# Patient Record
Sex: FEMALE | Race: WHITE | Employment: OTHER | URBAN - METROPOLITAN AREA
[De-identification: names, ages, dates, MRNs, and addresses within clinical notes are randomized per-mention and may not be internally consistent; named-entity substitution may affect disease eponyms.]

---

## 2019-10-28 ENCOUNTER — HOSPITAL ENCOUNTER (EMERGENCY)
Age: 84
Discharge: HOME | End: 2019-10-28
Attending: EMERGENCY MEDICINE
Payer: MEDICARE

## 2019-10-28 VITALS
WEIGHT: 125 LBS | HEART RATE: 71 BPM | TEMPERATURE: 97.9 F | HEIGHT: 62 IN | BODY MASS INDEX: 23 KG/M2 | OXYGEN SATURATION: 94 % | SYSTOLIC BLOOD PRESSURE: 151 MMHG | DIASTOLIC BLOOD PRESSURE: 71 MMHG | RESPIRATION RATE: 16 BRPM

## 2019-10-28 DIAGNOSIS — N39.0 URINARY TRACT INFECTION WITHOUT HEMATURIA, SITE UNSPECIFIED: Primary | ICD-10-CM

## 2019-10-28 LAB
BACTERIA: ABNORMAL /HPF
BILIRUBIN URINE: NEGATIVE
BLOOD, URINE: NEGATIVE
CLARITY: CLEAR
COLOR: YELLOW
EPITHELIAL CELLS, UA: ABNORMAL /HPF
GLUCOSE URINE: NEGATIVE MG/DL
KETONES, URINE: NEGATIVE MG/DL
LEUKOCYTE ESTERASE, URINE: ABNORMAL
NITRITE, URINE: NEGATIVE
PH UA: 6 (ref 5–9)
PROTEIN UA: NEGATIVE MG/DL
RBC UA: ABNORMAL /HPF (ref 0–2)
SPECIFIC GRAVITY UA: <=1.005 (ref 1–1.03)
UROBILINOGEN, URINE: 0.2 E.U./DL
WBC UA: ABNORMAL /HPF (ref 0–5)

## 2019-10-28 PROCEDURE — 87077 CULTURE AEROBIC IDENTIFY: CPT

## 2019-10-28 PROCEDURE — 99284 EMERGENCY DEPT VISIT MOD MDM: CPT

## 2019-10-28 PROCEDURE — 87088 URINE BACTERIA CULTURE: CPT

## 2019-10-28 PROCEDURE — 87186 SC STD MICRODIL/AGAR DIL: CPT

## 2019-10-28 PROCEDURE — 81001 URINALYSIS AUTO W/SCOPE: CPT

## 2019-10-28 RX ORDER — MONTELUKAST SODIUM 4 MG/1
2 TABLET, CHEWABLE ORAL 2 TIMES DAILY
COMMUNITY
End: 2022-05-23

## 2019-10-28 RX ORDER — TRAMADOL HYDROCHLORIDE 50 MG/1
50 TABLET ORAL EVERY 6 HOURS PRN
COMMUNITY
End: 2022-04-18 | Stop reason: SDUPTHER

## 2019-10-28 RX ORDER — ESCITALOPRAM OXALATE 20 MG/1
20 TABLET ORAL DAILY
COMMUNITY
End: 2021-12-14

## 2019-10-28 RX ORDER — CALCIUM CARBONATE 500(1250)
500 TABLET ORAL DAILY
COMMUNITY

## 2019-10-28 RX ORDER — ASPIRIN 81 MG/1
81 TABLET ORAL DAILY
COMMUNITY

## 2019-10-28 RX ORDER — ATORVASTATIN CALCIUM 40 MG/1
40 TABLET, FILM COATED ORAL DAILY
COMMUNITY
End: 2021-10-15

## 2019-10-28 RX ORDER — NITROFURANTOIN MACROCRYSTALS 100 MG/1
100 CAPSULE ORAL NIGHTLY
COMMUNITY
End: 2021-11-23 | Stop reason: SDUPTHER

## 2019-10-28 RX ORDER — LISINOPRIL 10 MG/1
10 TABLET ORAL DAILY
COMMUNITY
End: 2021-10-21

## 2019-10-28 RX ORDER — CEFDINIR 300 MG/1
300 CAPSULE ORAL 2 TIMES DAILY
Qty: 10 CAPSULE | Refills: 0 | Status: SHIPPED | OUTPATIENT
Start: 2019-10-28 | End: 2019-11-02

## 2019-10-28 RX ORDER — PANTOPRAZOLE SODIUM 40 MG/1
40 TABLET, DELAYED RELEASE ORAL DAILY
COMMUNITY
End: 2021-12-14

## 2019-10-28 SDOH — HEALTH STABILITY: MENTAL HEALTH: HOW OFTEN DO YOU HAVE A DRINK CONTAINING ALCOHOL?: NEVER

## 2019-10-28 ASSESSMENT — PAIN SCALES - GENERAL: PAINLEVEL_OUTOF10: 2

## 2019-10-28 ASSESSMENT — PAIN DESCRIPTION - DESCRIPTORS: DESCRIPTORS: BURNING

## 2019-10-30 LAB
ORGANISM: ABNORMAL
URINE CULTURE, ROUTINE: ABNORMAL

## 2019-11-08 ENCOUNTER — HOSPITAL ENCOUNTER (OUTPATIENT)
Age: 84
Discharge: HOME | End: 2019-11-08
Payer: MEDICARE

## 2019-11-08 ENCOUNTER — APPOINTMENT (OUTPATIENT)
Dept: CT IMAGING | Age: 84
End: 2019-11-08
Payer: MEDICARE

## 2019-11-08 ENCOUNTER — HOSPITAL ENCOUNTER (EMERGENCY)
Age: 84
Discharge: ANOTHER HOSPITAL | End: 2019-11-08
Attending: EMERGENCY MEDICINE
Payer: MEDICARE

## 2019-11-08 ENCOUNTER — HOSPITAL ENCOUNTER (INPATIENT)
Age: 84
LOS: 2 days | Discharge: HOME | DRG: 690 | End: 2019-11-10
Attending: INTERNAL MEDICINE | Admitting: INTERNAL MEDICINE
Payer: MEDICARE

## 2019-11-08 VITALS
RESPIRATION RATE: 16 BRPM | HEART RATE: 69 BPM | DIASTOLIC BLOOD PRESSURE: 62 MMHG | SYSTOLIC BLOOD PRESSURE: 142 MMHG | OXYGEN SATURATION: 96 % | TEMPERATURE: 97.8 F

## 2019-11-08 DIAGNOSIS — Z78.9 FAILURE OF OUTPATIENT TREATMENT: ICD-10-CM

## 2019-11-08 DIAGNOSIS — N30.00 ACUTE CYSTITIS WITHOUT HEMATURIA: Primary | ICD-10-CM

## 2019-11-08 PROBLEM — I10 HYPERTENSION: Status: ACTIVE | Noted: 2019-11-08

## 2019-11-08 PROBLEM — B96.5 PSEUDOMONAS URINARY TRACT INFECTION: Status: ACTIVE | Noted: 2019-11-08

## 2019-11-08 PROBLEM — N39.0 PSEUDOMONAS URINARY TRACT INFECTION: Status: ACTIVE | Noted: 2019-11-08

## 2019-11-08 LAB
ALBUMIN SERPL-MCNC: 4 G/DL (ref 3.5–5.2)
ALP BLD-CCNC: 82 U/L (ref 35–104)
ALT SERPL-CCNC: 9 U/L (ref 0–32)
AMYLASE: 79 U/L (ref 20–100)
ANION GAP SERPL CALCULATED.3IONS-SCNC: 12 MMOL/L (ref 7–16)
AST SERPL-CCNC: 25 U/L (ref 0–31)
BACTERIA: ABNORMAL /HPF
BASOPHILS ABSOLUTE: 0.17 E9/L (ref 0–0.2)
BASOPHILS RELATIVE PERCENT: 1.4 % (ref 0–2)
BILIRUB SERPL-MCNC: 0.4 MG/DL (ref 0–1.2)
BILIRUBIN URINE: NEGATIVE
BLOOD, URINE: NEGATIVE
BUN BLDV-MCNC: 17 MG/DL (ref 8–23)
CALCIUM SERPL-MCNC: 10 MG/DL (ref 8.6–10.2)
CHLORIDE BLD-SCNC: 100 MMOL/L (ref 98–107)
CLARITY: CLEAR
CO2: 25 MMOL/L (ref 22–29)
COLOR: YELLOW
CREAT SERPL-MCNC: 0.9 MG/DL (ref 0.5–1)
EKG ATRIAL RATE: 75 BPM
EKG P AXIS: 63 DEGREES
EKG P-R INTERVAL: 142 MS
EKG Q-T INTERVAL: 456 MS
EKG QRS DURATION: 74 MS
EKG QTC CALCULATION (BAZETT): 509 MS
EKG R AXIS: 29 DEGREES
EKG T AXIS: 74 DEGREES
EKG VENTRICULAR RATE: 75 BPM
EOSINOPHILS ABSOLUTE: 0.13 E9/L (ref 0.05–0.5)
EOSINOPHILS RELATIVE PERCENT: 1.1 % (ref 0–6)
GFR AFRICAN AMERICAN: >60
GFR NON-AFRICAN AMERICAN: 60 ML/MIN/1.73
GLUCOSE BLD-MCNC: 219 MG/DL (ref 74–99)
GLUCOSE URINE: 250 MG/DL
HCT VFR BLD CALC: 53.4 % (ref 34–48)
HEMOGLOBIN: 17.4 G/DL (ref 11.5–15.5)
IMMATURE GRANULOCYTES #: 0.08 E9/L
IMMATURE GRANULOCYTES %: 0.6 % (ref 0–5)
KETONES, URINE: NEGATIVE MG/DL
LACTIC ACID, SEPSIS: 1.7 MMOL/L (ref 0.5–1.9)
LEUKOCYTE ESTERASE, URINE: ABNORMAL
LYMPHOCYTES ABSOLUTE: 0.82 E9/L (ref 1.5–4)
LYMPHOCYTES RELATIVE PERCENT: 6.6 % (ref 20–42)
MCH RBC QN AUTO: 29.7 PG (ref 26–35)
MCHC RBC AUTO-ENTMCNC: 32.6 % (ref 32–34.5)
MCV RBC AUTO: 91.3 FL (ref 80–99.9)
MONOCYTES ABSOLUTE: 0.35 E9/L (ref 0.1–0.95)
MONOCYTES RELATIVE PERCENT: 2.8 % (ref 2–12)
NEUTROPHILS ABSOLUTE: 10.8 E9/L (ref 1.8–7.3)
NEUTROPHILS RELATIVE PERCENT: 87.5 % (ref 43–80)
NITRITE, URINE: NEGATIVE
PDW BLD-RTO: 14.2 FL (ref 11.5–15)
PH UA: 5 (ref 5–9)
PLATELET # BLD: 335 E9/L (ref 130–450)
PMV BLD AUTO: 11.6 FL (ref 7–12)
POTASSIUM REFLEX MAGNESIUM: 4.8 MMOL/L (ref 3.5–5)
PROTEIN UA: NEGATIVE MG/DL
RBC # BLD: 5.85 E12/L (ref 3.5–5.5)
RBC UA: ABNORMAL /HPF (ref 0–2)
SODIUM BLD-SCNC: 137 MMOL/L (ref 132–146)
SPECIFIC GRAVITY UA: 1.02 (ref 1–1.03)
TOTAL PROTEIN: 7.1 G/DL (ref 6.4–8.3)
TROPONIN: <0.01 NG/ML (ref 0–0.03)
UROBILINOGEN, URINE: 0.2 E.U./DL
WBC # BLD: 12.4 E9/L (ref 4.5–11.5)
WBC UA: ABNORMAL /HPF (ref 0–5)

## 2019-11-08 PROCEDURE — 74177 CT ABD & PELVIS W/CONTRAST: CPT

## 2019-11-08 PROCEDURE — A0425 GROUND MILEAGE: HCPCS

## 2019-11-08 PROCEDURE — 87088 URINE BACTERIA CULTURE: CPT

## 2019-11-08 PROCEDURE — 6360000002 HC RX W HCPCS: Performed by: NURSE PRACTITIONER

## 2019-11-08 PROCEDURE — 2580000003 HC RX 258: Performed by: NURSE PRACTITIONER

## 2019-11-08 PROCEDURE — 99223 1ST HOSP IP/OBS HIGH 75: CPT | Performed by: INTERNAL MEDICINE

## 2019-11-08 PROCEDURE — 6370000000 HC RX 637 (ALT 250 FOR IP): Performed by: NURSE PRACTITIONER

## 2019-11-08 PROCEDURE — 84484 ASSAY OF TROPONIN QUANT: CPT

## 2019-11-08 PROCEDURE — 36415 COLL VENOUS BLD VENIPUNCTURE: CPT

## 2019-11-08 PROCEDURE — 82150 ASSAY OF AMYLASE: CPT

## 2019-11-08 PROCEDURE — 93010 ELECTROCARDIOGRAM REPORT: CPT | Performed by: INTERNAL MEDICINE

## 2019-11-08 PROCEDURE — 6370000000 HC RX 637 (ALT 250 FOR IP): Performed by: INTERNAL MEDICINE

## 2019-11-08 PROCEDURE — APPSS45 APP SPLIT SHARED TIME 31-45 MINUTES: Performed by: NURSE PRACTITIONER

## 2019-11-08 PROCEDURE — A0428 BLS: HCPCS

## 2019-11-08 PROCEDURE — 96365 THER/PROPH/DIAG IV INF INIT: CPT

## 2019-11-08 PROCEDURE — 6360000004 HC RX CONTRAST MEDICATION: Performed by: RADIOLOGY

## 2019-11-08 PROCEDURE — 80053 COMPREHEN METABOLIC PANEL: CPT

## 2019-11-08 PROCEDURE — 2580000003 HC RX 258: Performed by: RADIOLOGY

## 2019-11-08 PROCEDURE — 85025 COMPLETE CBC W/AUTO DIFF WBC: CPT

## 2019-11-08 PROCEDURE — 96366 THER/PROPH/DIAG IV INF ADDON: CPT

## 2019-11-08 PROCEDURE — 93005 ELECTROCARDIOGRAM TRACING: CPT | Performed by: NURSE PRACTITIONER

## 2019-11-08 PROCEDURE — 83605 ASSAY OF LACTIC ACID: CPT

## 2019-11-08 PROCEDURE — 99285 EMERGENCY DEPT VISIT HI MDM: CPT

## 2019-11-08 PROCEDURE — 87040 BLOOD CULTURE FOR BACTERIA: CPT

## 2019-11-08 PROCEDURE — 1200000000 HC SEMI PRIVATE

## 2019-11-08 PROCEDURE — 81001 URINALYSIS AUTO W/SCOPE: CPT

## 2019-11-08 RX ORDER — PHENAZOPYRIDINE HYDROCHLORIDE 100 MG/1
100 TABLET, FILM COATED ORAL 3 TIMES DAILY PRN
Status: DISCONTINUED | OUTPATIENT
Start: 2019-11-08 | End: 2019-11-10 | Stop reason: HOSPADM

## 2019-11-08 RX ORDER — 0.9 % SODIUM CHLORIDE 0.9 %
1000 INTRAVENOUS SOLUTION INTRAVENOUS ONCE
Status: COMPLETED | OUTPATIENT
Start: 2019-11-08 | End: 2019-11-08

## 2019-11-08 RX ORDER — SODIUM CHLORIDE 0.9 % (FLUSH) 0.9 %
10 SYRINGE (ML) INJECTION EVERY 12 HOURS SCHEDULED
Status: DISCONTINUED | OUTPATIENT
Start: 2019-11-08 | End: 2019-11-10 | Stop reason: HOSPADM

## 2019-11-08 RX ORDER — SODIUM CHLORIDE 0.9 % (FLUSH) 0.9 %
10 SYRINGE (ML) INJECTION PRN
Status: DISCONTINUED | OUTPATIENT
Start: 2019-11-08 | End: 2019-11-08 | Stop reason: HOSPADM

## 2019-11-08 RX ORDER — ACETAMINOPHEN 325 MG/1
650 TABLET ORAL EVERY 4 HOURS PRN
Status: DISCONTINUED | OUTPATIENT
Start: 2019-11-08 | End: 2019-11-10 | Stop reason: HOSPADM

## 2019-11-08 RX ORDER — ESCITALOPRAM OXALATE 10 MG/1
20 TABLET ORAL DAILY
Status: DISCONTINUED | OUTPATIENT
Start: 2019-11-08 | End: 2019-11-10 | Stop reason: HOSPADM

## 2019-11-08 RX ORDER — SODIUM CHLORIDE 0.9 % (FLUSH) 0.9 %
10 SYRINGE (ML) INJECTION PRN
Status: DISCONTINUED | OUTPATIENT
Start: 2019-11-08 | End: 2019-11-10 | Stop reason: HOSPADM

## 2019-11-08 RX ORDER — SODIUM CHLORIDE 9 MG/ML
1000 INJECTION, SOLUTION INTRAVENOUS CONTINUOUS
Status: DISCONTINUED | OUTPATIENT
Start: 2019-11-08 | End: 2019-11-08 | Stop reason: HOSPADM

## 2019-11-08 RX ORDER — PROCHLORPERAZINE MALEATE 5 MG/1
5 TABLET ORAL EVERY 6 HOURS PRN
Status: DISCONTINUED | OUTPATIENT
Start: 2019-11-08 | End: 2019-11-10 | Stop reason: HOSPADM

## 2019-11-08 RX ORDER — ASPIRIN 81 MG/1
81 TABLET ORAL DAILY
Status: DISCONTINUED | OUTPATIENT
Start: 2019-11-08 | End: 2019-11-10 | Stop reason: HOSPADM

## 2019-11-08 RX ORDER — CALCIUM CARBONATE 500(1250)
500 TABLET ORAL 3 TIMES DAILY
Status: DISCONTINUED | OUTPATIENT
Start: 2019-11-08 | End: 2019-11-08 | Stop reason: CLARIF

## 2019-11-08 RX ORDER — LISINOPRIL 10 MG/1
10 TABLET ORAL DAILY
Status: DISCONTINUED | OUTPATIENT
Start: 2019-11-08 | End: 2019-11-10 | Stop reason: HOSPADM

## 2019-11-08 RX ORDER — ATORVASTATIN CALCIUM 40 MG/1
40 TABLET, FILM COATED ORAL DAILY
Status: DISCONTINUED | OUTPATIENT
Start: 2019-11-08 | End: 2019-11-10 | Stop reason: HOSPADM

## 2019-11-08 RX ORDER — ONDANSETRON 2 MG/ML
4 INJECTION INTRAMUSCULAR; INTRAVENOUS EVERY 6 HOURS PRN
Status: DISCONTINUED | OUTPATIENT
Start: 2019-11-08 | End: 2019-11-08

## 2019-11-08 RX ORDER — TRAMADOL HYDROCHLORIDE 50 MG/1
50 TABLET ORAL EVERY 6 HOURS PRN
Status: DISCONTINUED | OUTPATIENT
Start: 2019-11-08 | End: 2019-11-10 | Stop reason: HOSPADM

## 2019-11-08 RX ORDER — MONTELUKAST SODIUM 4 MG/1
1 TABLET, CHEWABLE ORAL
Status: DISCONTINUED | OUTPATIENT
Start: 2019-11-08 | End: 2019-11-10 | Stop reason: HOSPADM

## 2019-11-08 RX ORDER — CEFEPIME HYDROCHLORIDE 1 G/1
INJECTION, POWDER, FOR SOLUTION INTRAMUSCULAR; INTRAVENOUS
Status: DISCONTINUED
Start: 2019-11-08 | End: 2019-11-08 | Stop reason: HOSPADM

## 2019-11-08 RX ORDER — PANTOPRAZOLE SODIUM 40 MG/1
40 TABLET, DELAYED RELEASE ORAL DAILY
Status: DISCONTINUED | OUTPATIENT
Start: 2019-11-08 | End: 2019-11-10 | Stop reason: HOSPADM

## 2019-11-08 RX ORDER — SODIUM CHLORIDE 9 MG/ML
INJECTION, SOLUTION INTRAVENOUS CONTINUOUS
Status: DISCONTINUED | OUTPATIENT
Start: 2019-11-08 | End: 2019-11-09

## 2019-11-08 RX ADMIN — OYSTER SHELL CALCIUM WITH VITAMIN D 1 TABLET: 500; 200 TABLET, FILM COATED ORAL at 20:03

## 2019-11-08 RX ADMIN — Medication 10 ML: at 12:51

## 2019-11-08 RX ADMIN — PROCHLORPERAZINE MALEATE 5 MG: 5 TABLET, FILM COATED ORAL at 18:47

## 2019-11-08 RX ADMIN — LISINOPRIL 10 MG: 10 TABLET ORAL at 18:39

## 2019-11-08 RX ADMIN — SODIUM CHLORIDE: 9 INJECTION, SOLUTION INTRAVENOUS at 18:36

## 2019-11-08 RX ADMIN — CEFEPIME HYDROCHLORIDE 1 G: 1 INJECTION, POWDER, FOR SOLUTION INTRAMUSCULAR; INTRAVENOUS at 12:11

## 2019-11-08 RX ADMIN — PANTOPRAZOLE SODIUM 40 MG: 40 TABLET, DELAYED RELEASE ORAL at 18:39

## 2019-11-08 RX ADMIN — ENOXAPARIN SODIUM 40 MG: 40 INJECTION SUBCUTANEOUS at 18:39

## 2019-11-08 RX ADMIN — IOPAMIDOL 100 ML: 755 INJECTION, SOLUTION INTRAVENOUS at 12:51

## 2019-11-08 RX ADMIN — ATORVASTATIN CALCIUM 40 MG: 40 TABLET, FILM COATED ORAL at 18:39

## 2019-11-08 RX ADMIN — ASPIRIN 81 MG: 81 TABLET, FILM COATED ORAL at 18:39

## 2019-11-08 RX ADMIN — SODIUM CHLORIDE 1000 ML: 9 INJECTION, SOLUTION INTRAVENOUS at 12:12

## 2019-11-08 RX ADMIN — ESCITALOPRAM OXALATE 20 MG: 10 TABLET ORAL at 18:39

## 2019-11-08 ASSESSMENT — PAIN DESCRIPTION - ONSET
ONSET: ON-GOING
ONSET: GRADUAL

## 2019-11-08 ASSESSMENT — PAIN DESCRIPTION - LOCATION
LOCATION: ABDOMEN
LOCATION: ABDOMEN

## 2019-11-08 ASSESSMENT — PAIN DESCRIPTION - FREQUENCY: FREQUENCY: INTERMITTENT

## 2019-11-08 ASSESSMENT — PAIN DESCRIPTION - PROGRESSION
CLINICAL_PROGRESSION: NOT CHANGED
CLINICAL_PROGRESSION: NOT CHANGED

## 2019-11-08 ASSESSMENT — PAIN DESCRIPTION - DESCRIPTORS
DESCRIPTORS: BURNING;ACHING;DISCOMFORT
DESCRIPTORS: BURNING;ACHING

## 2019-11-08 ASSESSMENT — PAIN SCALES - GENERAL
PAINLEVEL_OUTOF10: 0
PAINLEVEL_OUTOF10: 4
PAINLEVEL_OUTOF10: 0

## 2019-11-08 ASSESSMENT — PAIN DESCRIPTION - PAIN TYPE
TYPE: ACUTE PAIN
TYPE: ACUTE PAIN

## 2019-11-09 LAB
ANION GAP SERPL CALCULATED.3IONS-SCNC: 11 MMOL/L (ref 7–16)
BUN BLDV-MCNC: 17 MG/DL (ref 8–23)
CALCIUM SERPL-MCNC: 9 MG/DL (ref 8.6–10.2)
CHLORIDE BLD-SCNC: 105 MMOL/L (ref 98–107)
CO2: 23 MMOL/L (ref 22–29)
CREAT SERPL-MCNC: 0.9 MG/DL (ref 0.5–1)
GFR AFRICAN AMERICAN: >60
GFR NON-AFRICAN AMERICAN: 60 ML/MIN/1.73
GLUCOSE BLD-MCNC: 134 MG/DL (ref 74–99)
HBA1C MFR BLD: 6.4 % (ref 4–5.6)
HCT VFR BLD CALC: 48.6 % (ref 34–48)
HEMOGLOBIN: 15.6 G/DL (ref 11.5–15.5)
MCH RBC QN AUTO: 28.8 PG (ref 26–35)
MCHC RBC AUTO-ENTMCNC: 32.1 % (ref 32–34.5)
MCV RBC AUTO: 89.7 FL (ref 80–99.9)
PDW BLD-RTO: 14.1 FL (ref 11.5–15)
PLATELET # BLD: 299 E9/L (ref 130–450)
PMV BLD AUTO: 10.5 FL (ref 7–12)
POTASSIUM SERPL-SCNC: 4.3 MMOL/L (ref 3.5–5)
RBC # BLD: 5.42 E12/L (ref 3.5–5.5)
SODIUM BLD-SCNC: 139 MMOL/L (ref 132–146)
URINE CULTURE, ROUTINE: NORMAL
WBC # BLD: 11.4 E9/L (ref 4.5–11.5)

## 2019-11-09 PROCEDURE — 6370000000 HC RX 637 (ALT 250 FOR IP): Performed by: NURSE PRACTITIONER

## 2019-11-09 PROCEDURE — 6370000000 HC RX 637 (ALT 250 FOR IP): Performed by: INTERNAL MEDICINE

## 2019-11-09 PROCEDURE — 83036 HEMOGLOBIN GLYCOSYLATED A1C: CPT

## 2019-11-09 PROCEDURE — 6360000002 HC RX W HCPCS: Performed by: NURSE PRACTITIONER

## 2019-11-09 PROCEDURE — 2580000003 HC RX 258: Performed by: SPECIALIST

## 2019-11-09 PROCEDURE — 51798 US URINE CAPACITY MEASURE: CPT

## 2019-11-09 PROCEDURE — 97161 PT EVAL LOW COMPLEX 20 MIN: CPT | Performed by: PHYSICAL THERAPIST

## 2019-11-09 PROCEDURE — 85027 COMPLETE CBC AUTOMATED: CPT

## 2019-11-09 PROCEDURE — 2580000003 HC RX 258: Performed by: NURSE PRACTITIONER

## 2019-11-09 PROCEDURE — 80048 BASIC METABOLIC PNL TOTAL CA: CPT

## 2019-11-09 PROCEDURE — 1200000000 HC SEMI PRIVATE

## 2019-11-09 PROCEDURE — 97530 THERAPEUTIC ACTIVITIES: CPT | Performed by: PHYSICAL THERAPIST

## 2019-11-09 PROCEDURE — APPSS30 APP SPLIT SHARED TIME 16-30 MINUTES: Performed by: NURSE PRACTITIONER

## 2019-11-09 PROCEDURE — 99232 SBSQ HOSP IP/OBS MODERATE 35: CPT | Performed by: INTERNAL MEDICINE

## 2019-11-09 PROCEDURE — 6360000002 HC RX W HCPCS: Performed by: SPECIALIST

## 2019-11-09 PROCEDURE — 36415 COLL VENOUS BLD VENIPUNCTURE: CPT

## 2019-11-09 RX ORDER — SODIUM CHLORIDE 9 MG/ML
INJECTION, SOLUTION INTRAVENOUS CONTINUOUS
Status: ACTIVE | OUTPATIENT
Start: 2019-11-09 | End: 2019-11-09

## 2019-11-09 RX ADMIN — OYSTER SHELL CALCIUM WITH VITAMIN D 1 TABLET: 500; 200 TABLET, FILM COATED ORAL at 14:16

## 2019-11-09 RX ADMIN — VANCOMYCIN HYDROCHLORIDE 1000 MG: 1 INJECTION, POWDER, LYOPHILIZED, FOR SOLUTION INTRAVENOUS at 21:06

## 2019-11-09 RX ADMIN — SODIUM CHLORIDE 100 ML/HR: 9 INJECTION, SOLUTION INTRAVENOUS at 04:32

## 2019-11-09 RX ADMIN — CEFEPIME HYDROCHLORIDE 1 G: 1 INJECTION, POWDER, FOR SOLUTION INTRAMUSCULAR; INTRAVENOUS at 05:36

## 2019-11-09 RX ADMIN — ESCITALOPRAM OXALATE 20 MG: 10 TABLET ORAL at 09:07

## 2019-11-09 RX ADMIN — LISINOPRIL 10 MG: 10 TABLET ORAL at 09:07

## 2019-11-09 RX ADMIN — ASPIRIN 81 MG: 81 TABLET, FILM COATED ORAL at 09:07

## 2019-11-09 RX ADMIN — Medication 10 ML: at 21:05

## 2019-11-09 RX ADMIN — OYSTER SHELL CALCIUM WITH VITAMIN D 1 TABLET: 500; 200 TABLET, FILM COATED ORAL at 09:07

## 2019-11-09 RX ADMIN — CEFEPIME HYDROCHLORIDE 1 G: 1 INJECTION, POWDER, FOR SOLUTION INTRAMUSCULAR; INTRAVENOUS at 17:59

## 2019-11-09 RX ADMIN — MONTELUKAST SODIUM 1 G: 4 TABLET, CHEWABLE ORAL at 11:45

## 2019-11-09 RX ADMIN — Medication 10 ML: at 09:08

## 2019-11-09 RX ADMIN — OYSTER SHELL CALCIUM WITH VITAMIN D 1 TABLET: 500; 200 TABLET, FILM COATED ORAL at 21:05

## 2019-11-09 RX ADMIN — PANTOPRAZOLE SODIUM 40 MG: 40 TABLET, DELAYED RELEASE ORAL at 09:07

## 2019-11-09 RX ADMIN — ENOXAPARIN SODIUM 40 MG: 40 INJECTION SUBCUTANEOUS at 09:08

## 2019-11-09 RX ADMIN — ATORVASTATIN CALCIUM 40 MG: 40 TABLET, FILM COATED ORAL at 09:07

## 2019-11-09 RX ADMIN — MONTELUKAST SODIUM 1 G: 4 TABLET, CHEWABLE ORAL at 09:06

## 2019-11-09 RX ADMIN — MONTELUKAST SODIUM 1 G: 4 TABLET, CHEWABLE ORAL at 17:57

## 2019-11-09 ASSESSMENT — PAIN SCALES - GENERAL
PAINLEVEL_OUTOF10: 0

## 2019-11-10 VITALS
BODY MASS INDEX: 23 KG/M2 | DIASTOLIC BLOOD PRESSURE: 63 MMHG | SYSTOLIC BLOOD PRESSURE: 135 MMHG | HEART RATE: 74 BPM | OXYGEN SATURATION: 98 % | TEMPERATURE: 97.8 F | HEIGHT: 62 IN | WEIGHT: 125 LBS | RESPIRATION RATE: 18 BRPM

## 2019-11-10 LAB
ANION GAP SERPL CALCULATED.3IONS-SCNC: 11 MMOL/L (ref 7–16)
BUN BLDV-MCNC: 14 MG/DL (ref 8–23)
CALCIUM SERPL-MCNC: 9.3 MG/DL (ref 8.6–10.2)
CHLORIDE BLD-SCNC: 101 MMOL/L (ref 98–107)
CO2: 24 MMOL/L (ref 22–29)
CREAT SERPL-MCNC: 0.9 MG/DL (ref 0.5–1)
GFR AFRICAN AMERICAN: >60
GFR NON-AFRICAN AMERICAN: 60 ML/MIN/1.73
GLUCOSE BLD-MCNC: 161 MG/DL (ref 74–99)
HCT VFR BLD CALC: 48.5 % (ref 34–48)
HEMOGLOBIN: 15.6 G/DL (ref 11.5–15.5)
MCH RBC QN AUTO: 29.1 PG (ref 26–35)
MCHC RBC AUTO-ENTMCNC: 32.2 % (ref 32–34.5)
MCV RBC AUTO: 90.3 FL (ref 80–99.9)
PDW BLD-RTO: 14.2 FL (ref 11.5–15)
PLATELET # BLD: 314 E9/L (ref 130–450)
PMV BLD AUTO: 11.3 FL (ref 7–12)
POTASSIUM SERPL-SCNC: 4.2 MMOL/L (ref 3.5–5)
RBC # BLD: 5.37 E12/L (ref 3.5–5.5)
SODIUM BLD-SCNC: 136 MMOL/L (ref 132–146)
URINE CULTURE, ROUTINE: NORMAL
WBC # BLD: 11.5 E9/L (ref 4.5–11.5)

## 2019-11-10 PROCEDURE — 99239 HOSP IP/OBS DSCHRG MGMT >30: CPT | Performed by: INTERNAL MEDICINE

## 2019-11-10 PROCEDURE — 6370000000 HC RX 637 (ALT 250 FOR IP): Performed by: NURSE PRACTITIONER

## 2019-11-10 PROCEDURE — 85027 COMPLETE CBC AUTOMATED: CPT

## 2019-11-10 PROCEDURE — APPSS45 APP SPLIT SHARED TIME 31-45 MINUTES: Performed by: NURSE PRACTITIONER

## 2019-11-10 PROCEDURE — 93005 ELECTROCARDIOGRAM TRACING: CPT | Performed by: INTERNAL MEDICINE

## 2019-11-10 PROCEDURE — 36415 COLL VENOUS BLD VENIPUNCTURE: CPT

## 2019-11-10 PROCEDURE — 2580000003 HC RX 258: Performed by: NURSE PRACTITIONER

## 2019-11-10 PROCEDURE — 80048 BASIC METABOLIC PNL TOTAL CA: CPT

## 2019-11-10 PROCEDURE — 6370000000 HC RX 637 (ALT 250 FOR IP): Performed by: INTERNAL MEDICINE

## 2019-11-10 PROCEDURE — 6360000002 HC RX W HCPCS: Performed by: NURSE PRACTITIONER

## 2019-11-10 RX ORDER — LEVOFLOXACIN 750 MG/1
750 TABLET ORAL
Qty: 3 TABLET | Refills: 0 | Status: SHIPPED | OUTPATIENT
Start: 2019-11-12 | End: 2019-12-18 | Stop reason: ALTCHOICE

## 2019-11-10 RX ORDER — LEVOFLOXACIN 750 MG/1
750 TABLET ORAL ONCE
Status: COMPLETED | OUTPATIENT
Start: 2019-11-10 | End: 2019-11-10

## 2019-11-10 RX ADMIN — PANTOPRAZOLE SODIUM 40 MG: 40 TABLET, DELAYED RELEASE ORAL at 06:02

## 2019-11-10 RX ADMIN — TRAMADOL HYDROCHLORIDE 50 MG: 50 TABLET, FILM COATED ORAL at 00:40

## 2019-11-10 RX ADMIN — ESCITALOPRAM OXALATE 20 MG: 10 TABLET ORAL at 06:03

## 2019-11-10 RX ADMIN — ATORVASTATIN CALCIUM 40 MG: 40 TABLET, FILM COATED ORAL at 06:02

## 2019-11-10 RX ADMIN — OYSTER SHELL CALCIUM WITH VITAMIN D 1 TABLET: 500; 200 TABLET, FILM COATED ORAL at 12:58

## 2019-11-10 RX ADMIN — OYSTER SHELL CALCIUM WITH VITAMIN D 1 TABLET: 500; 200 TABLET, FILM COATED ORAL at 06:03

## 2019-11-10 RX ADMIN — Medication 10 ML: at 06:05

## 2019-11-10 RX ADMIN — LISINOPRIL 10 MG: 10 TABLET ORAL at 09:16

## 2019-11-10 RX ADMIN — LEVOFLOXACIN 750 MG: 750 TABLET, FILM COATED ORAL at 12:58

## 2019-11-10 RX ADMIN — MONTELUKAST SODIUM 1 G: 4 TABLET, CHEWABLE ORAL at 11:55

## 2019-11-10 RX ADMIN — ASPIRIN 81 MG: 81 TABLET, FILM COATED ORAL at 06:02

## 2019-11-10 RX ADMIN — ENOXAPARIN SODIUM 40 MG: 40 INJECTION SUBCUTANEOUS at 09:16

## 2019-11-10 RX ADMIN — CEFEPIME HYDROCHLORIDE 1 G: 1 INJECTION, POWDER, FOR SOLUTION INTRAMUSCULAR; INTRAVENOUS at 06:00

## 2019-11-10 RX ADMIN — MONTELUKAST SODIUM 1 G: 4 TABLET, CHEWABLE ORAL at 08:14

## 2019-11-10 ASSESSMENT — PAIN SCALES - GENERAL
PAINLEVEL_OUTOF10: 4
PAINLEVEL_OUTOF10: 0

## 2019-11-10 ASSESSMENT — PAIN SCALES - WONG BAKER: WONGBAKER_NUMERICALRESPONSE: 0

## 2019-11-10 ASSESSMENT — PAIN - FUNCTIONAL ASSESSMENT: PAIN_FUNCTIONAL_ASSESSMENT: ACTIVITIES ARE NOT PREVENTED

## 2019-11-11 LAB
EKG ATRIAL RATE: 67 BPM
EKG P AXIS: 67 DEGREES
EKG P-R INTERVAL: 130 MS
EKG Q-T INTERVAL: 440 MS
EKG QRS DURATION: 88 MS
EKG QTC CALCULATION (BAZETT): 464 MS
EKG R AXIS: 40 DEGREES
EKG T AXIS: 78 DEGREES
EKG VENTRICULAR RATE: 67 BPM

## 2019-11-11 PROCEDURE — 93010 ELECTROCARDIOGRAM REPORT: CPT | Performed by: INTERNAL MEDICINE

## 2019-11-13 LAB
BLOOD CULTURE, ROUTINE: NORMAL
CULTURE, BLOOD 2: NORMAL

## 2019-12-30 ENCOUNTER — HOSPITAL ENCOUNTER (OUTPATIENT)
Age: 84
Discharge: HOME | End: 2020-01-01
Payer: MEDICARE

## 2019-12-30 LAB
ALBUMIN SERPL-MCNC: 4 G/DL (ref 3.5–5.2)
ALP BLD-CCNC: 68 U/L (ref 35–104)
ALT SERPL-CCNC: 11 U/L (ref 0–32)
ANION GAP SERPL CALCULATED.3IONS-SCNC: 15 MMOL/L (ref 7–16)
AST SERPL-CCNC: 17 U/L (ref 0–31)
BASOPHILS ABSOLUTE: 0.12 E9/L (ref 0–0.2)
BASOPHILS RELATIVE PERCENT: 1.2 % (ref 0–2)
BILIRUB SERPL-MCNC: 0.6 MG/DL (ref 0–1.2)
BUN BLDV-MCNC: 19 MG/DL (ref 8–23)
CALCIUM SERPL-MCNC: 9.7 MG/DL (ref 8.6–10.2)
CHLORIDE BLD-SCNC: 102 MMOL/L (ref 98–107)
CHOLESTEROL, TOTAL: 119 MG/DL (ref 0–199)
CO2: 23 MMOL/L (ref 22–29)
CREAT SERPL-MCNC: 1.2 MG/DL (ref 0.5–1)
EOSINOPHILS ABSOLUTE: 0.23 E9/L (ref 0.05–0.5)
EOSINOPHILS RELATIVE PERCENT: 2.3 % (ref 0–6)
GFR AFRICAN AMERICAN: 52
GFR NON-AFRICAN AMERICAN: 43 ML/MIN/1.73
GLUCOSE BLD-MCNC: 138 MG/DL (ref 74–99)
HCT VFR BLD CALC: 57.7 % (ref 34–48)
HDLC SERPL-MCNC: 46 MG/DL
HEMOGLOBIN: 16.8 G/DL (ref 11.5–15.5)
IMMATURE GRANULOCYTES #: 0.04 E9/L
IMMATURE GRANULOCYTES %: 0.4 % (ref 0–5)
LDL CHOLESTEROL CALCULATED: 44 MG/DL (ref 0–99)
LYMPHOCYTES ABSOLUTE: 0.98 E9/L (ref 1.5–4)
LYMPHOCYTES RELATIVE PERCENT: 9.7 % (ref 20–42)
MCH RBC QN AUTO: 25.5 PG (ref 26–35)
MCHC RBC AUTO-ENTMCNC: 29.1 % (ref 32–34.5)
MCV RBC AUTO: 87.4 FL (ref 80–99.9)
MONOCYTES ABSOLUTE: 0.34 E9/L (ref 0.1–0.95)
MONOCYTES RELATIVE PERCENT: 3.4 % (ref 2–12)
NEUTROPHILS ABSOLUTE: 8.4 E9/L (ref 1.8–7.3)
NEUTROPHILS RELATIVE PERCENT: 83 % (ref 43–80)
PDW BLD-RTO: 17.1 FL (ref 11.5–15)
PLATELET # BLD: 360 E9/L (ref 130–450)
PMV BLD AUTO: 12 FL (ref 7–12)
POTASSIUM SERPL-SCNC: 4.9 MMOL/L (ref 3.5–5)
RBC # BLD: 6.6 E12/L (ref 3.5–5.5)
SODIUM BLD-SCNC: 140 MMOL/L (ref 132–146)
TOTAL PROTEIN: 6.4 G/DL (ref 6.4–8.3)
TRIGL SERPL-MCNC: 144 MG/DL (ref 0–149)
TSH SERPL DL<=0.05 MIU/L-ACNC: 2.94 UIU/ML (ref 0.27–4.2)
VLDLC SERPL CALC-MCNC: 29 MG/DL
WBC # BLD: 10.1 E9/L (ref 4.5–11.5)

## 2019-12-30 PROCEDURE — 80053 COMPREHEN METABOLIC PANEL: CPT

## 2019-12-30 PROCEDURE — 36415 COLL VENOUS BLD VENIPUNCTURE: CPT

## 2019-12-30 PROCEDURE — 80061 LIPID PANEL: CPT

## 2019-12-30 PROCEDURE — 84443 ASSAY THYROID STIM HORMONE: CPT

## 2019-12-30 PROCEDURE — 85025 COMPLETE CBC W/AUTO DIFF WBC: CPT

## 2020-01-28 ENCOUNTER — HOSPITAL ENCOUNTER (OUTPATIENT)
Age: 85
Discharge: HOME | End: 2020-01-30
Payer: MEDICARE

## 2020-01-28 LAB
ACANTHOCYTES: ABNORMAL
ALBUMIN SERPL-MCNC: 4 G/DL (ref 3.5–5.2)
ALP BLD-CCNC: 72 U/L (ref 35–104)
ALT SERPL-CCNC: 10 U/L (ref 0–32)
ANION GAP SERPL CALCULATED.3IONS-SCNC: 18 MMOL/L (ref 7–16)
ANISOCYTOSIS: ABNORMAL
AST SERPL-CCNC: 17 U/L (ref 0–31)
BASOPHILS ABSOLUTE: 0.11 E9/L (ref 0–0.2)
BASOPHILS RELATIVE PERCENT: 1 % (ref 0–2)
BILIRUB SERPL-MCNC: 0.7 MG/DL (ref 0–1.2)
BUN BLDV-MCNC: 14 MG/DL (ref 8–23)
CALCIUM SERPL-MCNC: 9.9 MG/DL (ref 8.6–10.2)
CHLORIDE BLD-SCNC: 103 MMOL/L (ref 98–107)
CO2: 22 MMOL/L (ref 22–29)
CREAT SERPL-MCNC: 1.1 MG/DL (ref 0.5–1)
EOSINOPHILS ABSOLUTE: 0.3 E9/L (ref 0.05–0.5)
EOSINOPHILS RELATIVE PERCENT: 2.8 % (ref 0–6)
GFR AFRICAN AMERICAN: 57
GFR NON-AFRICAN AMERICAN: 47 ML/MIN/1.73
GLUCOSE BLD-MCNC: 141 MG/DL (ref 74–99)
HBA1C MFR BLD: 6.4 % (ref 4–5.6)
HCT VFR BLD CALC: 59.6 % (ref 34–48)
HEMOGLOBIN: 17 G/DL (ref 11.5–15.5)
IMMATURE GRANULOCYTES #: 0.05 E9/L
IMMATURE GRANULOCYTES %: 0.5 % (ref 0–5)
LYMPHOCYTES ABSOLUTE: 0.93 E9/L (ref 1.5–4)
LYMPHOCYTES RELATIVE PERCENT: 8.8 % (ref 20–42)
MCH RBC QN AUTO: 24.5 PG (ref 26–35)
MCHC RBC AUTO-ENTMCNC: 28.5 % (ref 32–34.5)
MCV RBC AUTO: 85.8 FL (ref 80–99.9)
MONOCYTES ABSOLUTE: 0.36 E9/L (ref 0.1–0.95)
MONOCYTES RELATIVE PERCENT: 3.4 % (ref 2–12)
NEUTROPHILS ABSOLUTE: 8.87 E9/L (ref 1.8–7.3)
NEUTROPHILS RELATIVE PERCENT: 83.5 % (ref 43–80)
OVALOCYTES: ABNORMAL
PDW BLD-RTO: 19.1 FL (ref 11.5–15)
PLATELET # BLD: 335 E9/L (ref 130–450)
PMV BLD AUTO: 11.7 FL (ref 7–12)
POIKILOCYTES: ABNORMAL
POLYCHROMASIA: ABNORMAL
POTASSIUM SERPL-SCNC: 4.6 MMOL/L (ref 3.5–5)
RBC # BLD: 6.95 E12/L (ref 3.5–5.5)
SODIUM BLD-SCNC: 143 MMOL/L (ref 132–146)
TOTAL PROTEIN: 6.6 G/DL (ref 6.4–8.3)
WBC # BLD: 10.6 E9/L (ref 4.5–11.5)

## 2020-01-28 PROCEDURE — 85025 COMPLETE CBC W/AUTO DIFF WBC: CPT

## 2020-01-28 PROCEDURE — 83036 HEMOGLOBIN GLYCOSYLATED A1C: CPT

## 2020-01-28 PROCEDURE — 84165 PROTEIN E-PHORESIS SERUM: CPT

## 2020-01-28 PROCEDURE — 36415 COLL VENOUS BLD VENIPUNCTURE: CPT

## 2020-01-28 PROCEDURE — 80053 COMPREHEN METABOLIC PANEL: CPT

## 2020-02-06 LAB
ALBUMIN SERPL-MCNC: ABNORMAL G/DL (ref 3.5–4.7)
ALPHA-1-GLOBULIN: ABNORMAL G/DL (ref 0.2–0.4)
ALPHA-2-GLOBULIN: ABNORMAL G/FL (ref 0.5–1)
BETA GLOBULIN: ABNORMAL G/DL (ref 0.8–1.3)
ELECTROPHORESIS: ABNORMAL
GAMMA GLOBULIN: ABNORMAL G/DL (ref 0.7–1.6)

## 2020-03-09 ENCOUNTER — HOSPITAL ENCOUNTER (OUTPATIENT)
Dept: INFUSION THERAPY | Age: 85
Discharge: HOME | End: 2020-03-09
Payer: MEDICARE

## 2020-03-09 ENCOUNTER — OFFICE VISIT (OUTPATIENT)
Dept: ONCOLOGY | Age: 85
End: 2020-03-09
Payer: MEDICARE

## 2020-03-09 VITALS
SYSTOLIC BLOOD PRESSURE: 149 MMHG | OXYGEN SATURATION: 92 % | HEART RATE: 71 BPM | BODY MASS INDEX: 22.96 KG/M2 | HEIGHT: 63 IN | DIASTOLIC BLOOD PRESSURE: 74 MMHG | TEMPERATURE: 97.7 F | WEIGHT: 129.6 LBS

## 2020-03-09 DIAGNOSIS — D45 POLYCYTHEMIA VERA (HCC): ICD-10-CM

## 2020-03-09 LAB
ACANTHOCYTES: ABNORMAL
ALBUMIN SERPL-MCNC: 3.9 G/DL (ref 3.5–5.2)
ALP BLD-CCNC: 74 U/L (ref 35–104)
ALT SERPL-CCNC: 12 U/L (ref 0–32)
ANION GAP SERPL CALCULATED.3IONS-SCNC: 15 MMOL/L (ref 7–16)
ANISOCYTOSIS: ABNORMAL
AST SERPL-CCNC: 24 U/L (ref 0–31)
BASOPHILS ABSOLUTE: 0.08 E9/L (ref 0–0.2)
BASOPHILS RELATIVE PERCENT: 0.9 % (ref 0–2)
BILIRUB SERPL-MCNC: 0.6 MG/DL (ref 0–1.2)
BUN BLDV-MCNC: 21 MG/DL (ref 8–23)
BURR CELLS: ABNORMAL
CALCIUM SERPL-MCNC: 9.8 MG/DL (ref 8.6–10.2)
CHLORIDE BLD-SCNC: 101 MMOL/L (ref 98–107)
CO2: 21 MMOL/L (ref 22–29)
CREAT SERPL-MCNC: 0.9 MG/DL (ref 0.5–1)
EOSINOPHILS ABSOLUTE: 0.24 E9/L (ref 0.05–0.5)
EOSINOPHILS RELATIVE PERCENT: 2.6 % (ref 0–6)
GFR AFRICAN AMERICAN: >60
GFR NON-AFRICAN AMERICAN: 60 ML/MIN/1.73
GLUCOSE BLD-MCNC: 96 MG/DL (ref 74–99)
HCT VFR BLD CALC: 57.4 % (ref 34–48)
HEMOGLOBIN: 17.9 G/DL (ref 11.5–15.5)
LYMPHOCYTES ABSOLUTE: 1 E9/L (ref 1.5–4)
LYMPHOCYTES RELATIVE PERCENT: 11.3 % (ref 20–42)
MCH RBC QN AUTO: 26.4 PG (ref 26–35)
MCHC RBC AUTO-ENTMCNC: 31.2 % (ref 32–34.5)
MCV RBC AUTO: 84.8 FL (ref 80–99.9)
MONOCYTES ABSOLUTE: 0.36 E9/L (ref 0.1–0.95)
MONOCYTES RELATIVE PERCENT: 4.3 % (ref 2–12)
NEUTROPHILS ABSOLUTE: 7.37 E9/L (ref 1.8–7.3)
NEUTROPHILS RELATIVE PERCENT: 80.9 % (ref 43–80)
NUCLEATED RED BLOOD CELLS: 0.9 /100 WBC
OVALOCYTES: ABNORMAL
PDW BLD-RTO: 20.1 FL (ref 11.5–15)
PLATELET # BLD: 284 E9/L (ref 130–450)
PMV BLD AUTO: 10.6 FL (ref 7–12)
POIKILOCYTES: ABNORMAL
POLYCHROMASIA: ABNORMAL
POTASSIUM SERPL-SCNC: 4.3 MMOL/L (ref 3.5–5)
RBC # BLD: 6.77 E12/L (ref 3.5–5.5)
SODIUM BLD-SCNC: 137 MMOL/L (ref 132–146)
TARGET CELLS: ABNORMAL
TOTAL PROTEIN: 6.6 G/DL (ref 6.4–8.3)
VACUOLATED NEUTROPHILS: ABNORMAL
WBC # BLD: 9.1 E9/L (ref 4.5–11.5)

## 2020-03-09 PROCEDURE — 81270 JAK2 GENE: CPT

## 2020-03-09 PROCEDURE — 36415 COLL VENOUS BLD VENIPUNCTURE: CPT

## 2020-03-09 PROCEDURE — 85025 COMPLETE CBC W/AUTO DIFF WBC: CPT

## 2020-03-09 PROCEDURE — 88275 CYTOGENETICS 100-300: CPT

## 2020-03-09 PROCEDURE — 82668 ASSAY OF ERYTHROPOIETIN: CPT

## 2020-03-09 PROCEDURE — 80053 COMPREHEN METABOLIC PANEL: CPT

## 2020-03-09 PROCEDURE — 88271 CYTOGENETICS DNA PROBE: CPT

## 2020-03-09 PROCEDURE — 99214 OFFICE O/P EST MOD 30 MIN: CPT | Performed by: INTERNAL MEDICINE

## 2020-03-09 RX ORDER — M-VIT,TX,IRON,MINS/CALC/FOLIC 27MG-0.4MG
1 TABLET ORAL DAILY
COMMUNITY

## 2020-03-09 NOTE — PROGRESS NOTES
Disease Mother     Other Father        Social History     Socioeconomic History    Marital status:      Spouse name: Not on file    Number of children: Not on file    Years of education: Not on file    Highest education level: Not on file   Occupational History    Not on file   Social Needs    Financial resource strain: Not on file    Food insecurity     Worry: Not on file     Inability: Not on file    Transportation needs     Medical: Not on file     Non-medical: Not on file   Tobacco Use    Smoking status: Never Smoker    Smokeless tobacco: Never Used   Substance and Sexual Activity    Alcohol use: Not Currently     Frequency: Never    Drug use: Never    Sexual activity: Not on file   Lifestyle    Physical activity     Days per week: Not on file     Minutes per session: Not on file    Stress: Not on file   Relationships    Social connections     Talks on phone: Not on file     Gets together: Not on file     Attends Latter day service: Not on file     Active member of club or organization: Not on file     Attends meetings of clubs or organizations: Not on file     Relationship status: Not on file    Intimate partner violence     Fear of current or ex partner: Not on file     Emotionally abused: Not on file     Physically abused: Not on file     Forced sexual activity: Not on file   Other Topics Concern    Not on file   Social History Narrative    Not on file           Occupation: retired  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 REVIEW OF SYSTEMS:     Pacemaker/Defibulator/ICD:  No    Mediport: No           FALLS RISK SCREENING ASSESSMENT    Instructions:  Assess the patient and [place] the appropriate indicators that are present for fall risk identification. Total the numbers circled and assign a fall risk score from Table 2.  Reassess patient at a minimum every 12 weeks or with status change. Assessment   Date  3/9/2020     1.   Mental Ability: confusion/cognitively

## 2020-03-09 NOTE — PROGRESS NOTES
% 9.7 (L) 8.8 (L)   Monocytes % Latest Ref Range: 2.0 - 12.0 % 3.4 3.4   Eosinophils % Latest Ref Range: 0.0 - 6.0 % 2.3 2.8   Basophils % Latest Ref Range: 0.0 - 2.0 % 1.2 1.0   Neutrophils Absolute Latest Ref Range: 1.80 - 7.30 E9/L 8.40 (H) 8.87 (H)   Immature Granulocytes # Latest Units: E9/L 0.04 0.05   Lymphocytes Absolute Latest Ref Range: 1.50 - 4.00 E9/L 0.98 (L) 0.93 (L)   Monocytes Absolute Latest Ref Range: 0.10 - 0.95 E9/L 0.34 0.36   Eosinophils Absolute Latest Ref Range: 0.05 - 0.50 E9/L 0.23 0.30   Basophils Absolute Latest Ref Range: 0.00 - 0.20 E9/L 0.12 0.11   Poikilocytes Unknown  2+   Polychromasia Unknown  1+           Diagnostic Data:     Past Medical History:   Diagnosis Date    Hyperlipidemia     UTI (urinary tract infection)        Patient Active Problem List    Diagnosis Date Noted    Pseudomonas urinary tract infection 11/08/2019    Hypertension 11/08/2019        Past Surgical History:   Procedure Laterality Date    CHOLECYSTECTOMY      PARATHYROIDECTOMY      THYROIDECTOMY, PARTIAL         Family History  Family History   Problem Relation Age of Onset    Heart Disease Mother     Other Father        Social History  TOBACCO:   reports that she has never smoked. She has never used smokeless tobacco.  ETOH:   reports previous alcohol use. Home Medications  Prior to Admission medications    Medication Sig Start Date End Date Taking? Authorizing Provider   atorvastatin (LIPITOR) 40 MG tablet Take 40 mg by mouth daily    Historical Provider, MD   aspirin 81 MG EC tablet Take 81 mg by mouth daily    Historical Provider, MD   nitrofurantoin (MACRODANTIN) 100 MG capsule Take 100 mg by mouth nightly     Historical Provider, MD   traMADol (ULTRAM) 50 MG tablet Take 50 mg by mouth every 6 hours as needed for Pain.     Historical Provider, MD   calcium carbonate (OSCAL) 500 MG TABS tablet Take 500 mg by mouth daily Indications: plus 10 mcg D3    Historical Provider, MD   lisinopril AAO to person, place, time, in no acute distress. Head and neck: PERRLA, EOMI . Sclera non icteric. Oropharynx: Clear  Neck: no JVD,  no adenopathy,   Heart: Regular rate and regular rhythm,   Lungs: Clear to auscultation. Abdomen: Soft, non-tender;no masses, no organomegaly. Extremities: No edema,no cyanosis, no clubbing. Neurologic:Cranial nerves grossly intact. No focal motor or sensory deficits. Skin:  No rash. Recent Laboratory Data-   Lab Results   Component Value Date    WBC 10.6 01/28/2020    HGB 17.0 (H) 01/28/2020    HCT 59.6 (H) 01/28/2020    MCV 85.8 01/28/2020     01/28/2020    LYMPHOPCT 8.8 (L) 01/28/2020    RBC 6.95 (H) 01/28/2020    MCH 24.5 (L) 01/28/2020    MCHC 28.5 (L) 01/28/2020    RDW 19.1 (H) 01/28/2020    NEUTOPHILPCT 83.5 (H) 01/28/2020    MONOPCT 3.4 01/28/2020    BASOPCT 1.0 01/28/2020    NEUTROABS 8.87 (H) 01/28/2020    LYMPHSABS 0.93 (L) 01/28/2020    MONOSABS 0.36 01/28/2020    EOSABS 0.30 01/28/2020    BASOSABS 0.11 01/28/2020       Lab Results   Component Value Date     01/28/2020    K 4.6 01/28/2020     01/28/2020    CO2 22 01/28/2020    BUN 14 01/28/2020    CREATININE 1.1 (H) 01/28/2020    GLUCOSE 141 (H) 01/28/2020    CALCIUM 9.9 01/28/2020    PROT 6.6 01/28/2020    LABALBU 4.0 01/28/2020    LABALBU SEE NOTE (AA) 01/28/2020    BILITOT 0.7 01/28/2020    ALKPHOS 72 01/28/2020    AST 17 01/28/2020    ALT 10 01/28/2020    LABGLOM 47 01/28/2020    GFRAA 57 01/28/2020       No results found for: IRON, TIBC, FERRITIN        Radiology-  No results found. ASSESSMENT/PLAN : Polycythemia with borderline neutrophilic leukocytosis. She has no clinical suggestion of reactive compensatory secondary polycythemia with absence of prior history of smoking COPD or sleep apnea    Rule out primary myeloproliferative disorder such as P vera.   Rule out CML doubt    To check CBC, blood smear review, baseline EPO level, qualitative JAK2 mutation as well as BCR ABL by [address] [place] [address]. We will follow            Claudeen Kirk. Filomena Fay M.D., F.A.C.P.   Electronically signed 3/9/2020 at 3:23 PM

## 2020-03-11 LAB — ERYTHROPOIETIN: 1 MU/ML (ref 4–27)

## 2020-03-13 LAB — JAK2 GENE MUTATION QUAL: POSITIVE

## 2020-03-18 LAB
Lab: NORMAL
REPORT: NORMAL
THIS TEST SENT TO: NORMAL

## 2020-03-23 ENCOUNTER — CLINICAL DOCUMENTATION (OUTPATIENT)
Dept: INFUSION THERAPY | Age: 85
End: 2020-03-23

## 2020-03-27 NOTE — PROGRESS NOTES
[address] [facility]. [facility]        Pt Name: Shantell Tadeo  YOB: 1935  Date of evaluation: 3/27/2020  Primary Care Physician: Bernardo Sanabria DO  Reason for evaluation:   Chief Complaint   Patient presents with    Follow-up     Polycythemia vera        Subjective: Here for results of lab work and follow up. Remains very plethoric      OBJECTIVE:  VITALS:  height is 5' 3\" (1.6 m) and weight is 131 lb 3.2 oz (59.5 kg). Her temporal temperature is 97.7 °F (36.5 °C). Her blood pressure is 138/68 and her pulse is 82. Her oxygen saturation is 96%. Physical Exam:  Performance Status: 0  Well developed, well nourished female  General: AAO to person, place, time, in no acute distress. Head and neck: PERRLA, EOMI . Sclera non icteric. Oropharynx: Clear. Neck: no JVD,  no adenopathy. Heart: Regular rate and regular rhythm. No murmur. Lungs: Clear to auscultation. Abdomen: Soft, non-tender;no masses, no organomegaly. Extremities: No edema,no cyanosis, no clubbing. Neurologic:Cranial nerves grossly intact. No focal motor or sensory deficits. Skin:  No rash. Medications  Prior to Admission medications    Medication Sig Start Date End Date Taking? Authorizing Provider   Multiple Vitamins-Minerals (THERAPEUTIC MULTIVITAMIN-MINERALS) tablet Take 1 tablet by mouth daily    Historical Provider, MD   atorvastatin (LIPITOR) 40 MG tablet Take 40 mg by mouth daily    Historical Provider, MD   aspirin 81 MG EC tablet Take 81 mg by mouth daily    Historical Provider, MD   nitrofurantoin (MACRODANTIN) 100 MG capsule Take 100 mg by mouth nightly     Historical Provider, MD   traMADol (ULTRAM) 50 MG tablet Take 50 mg by mouth every 6 hours as needed for Pain.     Historical Provider, MD   calcium carbonate (OSCAL) 500 MG TABS tablet Take 500 mg by mouth daily Indications: plus 10 mcg D3 Patient is taking 3 tablets daily    Historical Provider, MD   lisinopril (PRINIVIL;ZESTRIL) 10 MG tablet Take 10 mg by mouth daily    Historical Provider, MD   escitalopram (LEXAPRO) 20 MG tablet Take 20 mg by mouth daily    Historical Provider, MD   pantoprazole (PROTONIX) 40 MG tablet Take 40 mg by mouth daily    Historical Provider, MD   colestipol (COLESTID) 1 g tablet Take 2 g by mouth 2 times daily     Historical Provider, MD    Scheduled Meds:  Continuous Infusions:  PRN Meds:.        Recent Laboratory Data-     Lab Results   Component Value Date    WBC 9.1 03/09/2020    HGB 17.9 (H) 03/09/2020    HCT 57.4 (H) 03/09/2020    MCV 84.8 03/09/2020     03/09/2020    LYMPHOPCT 11.3 (L) 03/09/2020    RBC 6.77 (H) 03/09/2020    MCH 26.4 03/09/2020    MCHC 31.2 (L) 03/09/2020    RDW 20.1 (H) 03/09/2020    NEUTOPHILPCT 80.9 (H) 03/09/2020    MONOPCT 4.3 03/09/2020    BASOPCT 0.9 03/09/2020    NEUTROABS 7.37 (H) 03/09/2020    LYMPHSABS 1.00 (L) 03/09/2020    MONOSABS 0.36 03/09/2020    EOSABS 0.24 03/09/2020    BASOSABS 0.08 03/09/2020       Lab Results   Component Value Date     03/09/2020    K 4.3 03/09/2020     03/09/2020    CO2 21 (L) 03/09/2020    BUN 21 03/09/2020    CREATININE 0.9 03/09/2020    GLUCOSE 96 03/09/2020    CALCIUM 9.8 03/09/2020    PROT 6.6 03/09/2020    LABALBU 3.9 03/09/2020    BILITOT 0.6 03/09/2020    ALKPHOS 74 03/09/2020    AST 24 03/09/2020    ALT 12 03/09/2020    LABGLOM 60 03/09/2020    GFRAA >60 03/09/2020        Ref. Range 3/9/2020 16:24   Erythropoietin Latest Ref Range: 4 - 27 mU/mL 1 (L)       QUALITATIVE JAK2 MUTATION:  POSITIVE.    BCR/ABL1 Interphase FISH:  NEGATIVE    To check   , blood smear review,        Radiology-  No results found. ASSESSMENT/PLAN :  Polycythemia with borderline neutrophilic leukocytosis.     She has no clinical suggestion of reactive compensatory secondary polycythemia with absence of prior history of smoking COPD or sleep apnea     Rule out primary myeloproliferative disorder such as P vera.   Rule out CML doubt     To check CBC, blood smear review, baseline EPO level, qualitative JAK2 mutation as well as BCR ABL by FISH. We will follow    Her EPO level was low at 1. Qualitative Jose Guadalupe 2 mutation was positive  BCR-ABL by FISH was negative    She has polycythemia vera  Hemoglobin today was elevated at 17.9    She will be recommended therapeutic phlebotomy and 250 cc of blood will be removed under medical supervision. She will be initiated on hydroxyurea 500 mg daily    Darnell Kearney M.D., F.A.C.P.   Electronically signed 3/27/2020 at 8:50 AM

## 2020-03-30 ENCOUNTER — HOSPITAL ENCOUNTER (OUTPATIENT)
Dept: INFUSION THERAPY | Age: 85
Discharge: HOME | End: 2020-03-30
Payer: MEDICARE

## 2020-03-30 ENCOUNTER — OFFICE VISIT (OUTPATIENT)
Dept: ONCOLOGY | Age: 85
End: 2020-03-30
Payer: MEDICARE

## 2020-03-30 VITALS
HEIGHT: 63 IN | OXYGEN SATURATION: 96 % | WEIGHT: 131.2 LBS | DIASTOLIC BLOOD PRESSURE: 68 MMHG | HEART RATE: 82 BPM | TEMPERATURE: 97.7 F | BODY MASS INDEX: 23.25 KG/M2 | SYSTOLIC BLOOD PRESSURE: 138 MMHG

## 2020-03-30 VITALS — SYSTOLIC BLOOD PRESSURE: 133 MMHG | HEART RATE: 74 BPM | DIASTOLIC BLOOD PRESSURE: 60 MMHG

## 2020-03-30 DIAGNOSIS — D45 POLYCYTHEMIA VERA (HCC): Primary | ICD-10-CM

## 2020-03-30 LAB
ALBUMIN SERPL-MCNC: 3.9 G/DL (ref 3.5–5.2)
ALP BLD-CCNC: 72 U/L (ref 35–104)
ALT SERPL-CCNC: 12 U/L (ref 0–32)
ANION GAP SERPL CALCULATED.3IONS-SCNC: 18 MMOL/L (ref 7–16)
AST SERPL-CCNC: 17 U/L (ref 0–31)
BASOPHILS ABSOLUTE: 0.1 E9/L (ref 0–0.2)
BASOPHILS RELATIVE PERCENT: 1.1 % (ref 0–2)
BILIRUB SERPL-MCNC: 0.5 MG/DL (ref 0–1.2)
BUN BLDV-MCNC: 13 MG/DL (ref 8–23)
CALCIUM SERPL-MCNC: 9.4 MG/DL (ref 8.6–10.2)
CHLORIDE BLD-SCNC: 98 MMOL/L (ref 98–107)
CO2: 20 MMOL/L (ref 22–29)
CREAT SERPL-MCNC: 1 MG/DL (ref 0.5–1)
EOSINOPHILS ABSOLUTE: 0.26 E9/L (ref 0.05–0.5)
EOSINOPHILS RELATIVE PERCENT: 2.8 % (ref 0–6)
GFR AFRICAN AMERICAN: >60
GFR NON-AFRICAN AMERICAN: 53 ML/MIN/1.73
GLUCOSE BLD-MCNC: 311 MG/DL (ref 74–99)
HCT VFR BLD CALC: 57.6 % (ref 34–48)
HEMOGLOBIN: 17.7 G/DL (ref 11.5–15.5)
IMMATURE GRANULOCYTES #: 0.03 E9/L
IMMATURE GRANULOCYTES %: 0.3 % (ref 0–5)
LYMPHOCYTES ABSOLUTE: 0.9 E9/L (ref 1.5–4)
LYMPHOCYTES RELATIVE PERCENT: 9.8 % (ref 20–42)
MCH RBC QN AUTO: 26.7 PG (ref 26–35)
MCHC RBC AUTO-ENTMCNC: 30.7 % (ref 32–34.5)
MCV RBC AUTO: 86.9 FL (ref 80–99.9)
MONOCYTES ABSOLUTE: 0.19 E9/L (ref 0.1–0.95)
MONOCYTES RELATIVE PERCENT: 2.1 % (ref 2–12)
NEUTROPHILS ABSOLUTE: 7.73 E9/L (ref 1.8–7.3)
NEUTROPHILS RELATIVE PERCENT: 83.9 % (ref 43–80)
PDW BLD-RTO: 18.9 FL (ref 11.5–15)
PLATELET # BLD: 289 E9/L (ref 130–450)
PMV BLD AUTO: 10.8 FL (ref 7–12)
POTASSIUM SERPL-SCNC: 4.6 MMOL/L (ref 3.5–5)
RBC # BLD: 6.63 E12/L (ref 3.5–5.5)
SODIUM BLD-SCNC: 136 MMOL/L (ref 132–146)
TOTAL PROTEIN: 5.8 G/DL (ref 6.4–8.3)
WBC # BLD: 9.2 E9/L (ref 4.5–11.5)

## 2020-03-30 PROCEDURE — 99195 PHLEBOTOMY: CPT

## 2020-03-30 PROCEDURE — 36415 COLL VENOUS BLD VENIPUNCTURE: CPT

## 2020-03-30 PROCEDURE — 85025 COMPLETE CBC W/AUTO DIFF WBC: CPT

## 2020-03-30 PROCEDURE — 80053 COMPREHEN METABOLIC PANEL: CPT

## 2020-03-30 RX ORDER — HYDROXYUREA 500 MG/1
500 CAPSULE ORAL DAILY
Qty: 30 CAPSULE | Refills: 0 | Status: SHIPPED
Start: 2020-03-30 | End: 2020-04-27 | Stop reason: SDUPTHER

## 2020-03-30 RX ORDER — 0.9 % SODIUM CHLORIDE 0.9 %
250 INTRAVENOUS SOLUTION INTRAVENOUS ONCE
Status: CANCELLED | OUTPATIENT
Start: 2020-03-30

## 2020-03-30 RX ORDER — 0.9 % SODIUM CHLORIDE 0.9 %
500 INTRAVENOUS SOLUTION INTRAVENOUS ONCE
Status: CANCELLED | OUTPATIENT
Start: 2020-03-30

## 2020-03-30 NOTE — PROGRESS NOTES
IV site initiated at [facility] with 20# intima. Therapeutic phlebotomy performed for 250 cc blood. Tolerated procedure and 15 minute observation without adverse reaction. Took fluids freely throughout observation. Verbal information provided to patient / family on procedure and post procedure care. Encouraged to call clinic during clinic hours and physician after clinic hours if questions or concerns arise.

## 2020-04-24 ENCOUNTER — HOSPITAL ENCOUNTER (OUTPATIENT)
Dept: INFUSION THERAPY | Age: 85
Discharge: HOME | End: 2020-04-24
Payer: MEDICARE

## 2020-04-24 DIAGNOSIS — D45 POLYCYTHEMIA VERA (HCC): ICD-10-CM

## 2020-04-24 LAB
ALBUMIN SERPL-MCNC: 3.9 G/DL (ref 3.5–5.2)
ALP BLD-CCNC: 75 U/L (ref 35–104)
ALT SERPL-CCNC: 12 U/L (ref 0–32)
ANION GAP SERPL CALCULATED.3IONS-SCNC: 13 MMOL/L (ref 7–16)
ANISOCYTOSIS: ABNORMAL
AST SERPL-CCNC: 21 U/L (ref 0–31)
BASOPHILS ABSOLUTE: 0 E9/L (ref 0–0.2)
BASOPHILS RELATIVE PERCENT: 1.1 % (ref 0–2)
BILIRUB SERPL-MCNC: 0.6 MG/DL (ref 0–1.2)
BUN BLDV-MCNC: 25 MG/DL (ref 8–23)
CALCIUM SERPL-MCNC: 9.7 MG/DL (ref 8.6–10.2)
CHLORIDE BLD-SCNC: 90 MMOL/L (ref 98–107)
CO2: 26 MMOL/L (ref 22–29)
CREAT SERPL-MCNC: 1.2 MG/DL (ref 0.5–1)
EOSINOPHILS ABSOLUTE: 0.23 E9/L (ref 0.05–0.5)
EOSINOPHILS RELATIVE PERCENT: 2.6 % (ref 0–6)
GFR AFRICAN AMERICAN: 52
GFR NON-AFRICAN AMERICAN: 43 ML/MIN/1.73
GLUCOSE BLD-MCNC: 290 MG/DL (ref 74–99)
HCT VFR BLD CALC: 57 % (ref 34–48)
HEMOGLOBIN: 18 G/DL (ref 11.5–15.5)
HYPOCHROMIA: ABNORMAL
LYMPHOCYTES ABSOLUTE: 0.97 E9/L (ref 1.5–4)
LYMPHOCYTES RELATIVE PERCENT: 10.5 % (ref 20–42)
MCH RBC QN AUTO: 28.4 PG (ref 26–35)
MCHC RBC AUTO-ENTMCNC: 31.6 % (ref 32–34.5)
MCV RBC AUTO: 90 FL (ref 80–99.9)
MONOCYTES ABSOLUTE: 0.35 E9/L (ref 0.1–0.95)
MONOCYTES RELATIVE PERCENT: 4.4 % (ref 2–12)
NEUTROPHILS ABSOLUTE: 7.3 E9/L (ref 1.8–7.3)
NEUTROPHILS RELATIVE PERCENT: 82.5 % (ref 43–80)
NUCLEATED RED BLOOD CELLS: 0.9 /100 WBC
PDW BLD-RTO: 20.5 FL (ref 11.5–15)
PLATELET # BLD: 227 E9/L (ref 130–450)
PMV BLD AUTO: 10.8 FL (ref 7–12)
POIKILOCYTES: ABNORMAL
POLYCHROMASIA: ABNORMAL
POTASSIUM SERPL-SCNC: 4.7 MMOL/L (ref 3.5–5)
RBC # BLD: 6.33 E12/L (ref 3.5–5.5)
SODIUM BLD-SCNC: 129 MMOL/L (ref 132–146)
TOTAL PROTEIN: 6.5 G/DL (ref 6.4–8.3)
WBC # BLD: 8.8 E9/L (ref 4.5–11.5)

## 2020-04-24 PROCEDURE — 85025 COMPLETE CBC W/AUTO DIFF WBC: CPT

## 2020-04-24 PROCEDURE — 36415 COLL VENOUS BLD VENIPUNCTURE: CPT

## 2020-04-24 PROCEDURE — 80053 COMPREHEN METABOLIC PANEL: CPT

## 2020-04-24 NOTE — PROGRESS NOTES
[address] [facility]. [facility] [place]        Pt Name: Francoise Graham  YOB: 1935  Date of evaluation: 4/27/2020  Primary Care Physician: Kendrick Mc DO  Reason for evaluation:   Chief Complaint   Patient presents with    Follow-up     Polycythemia        Subjective: Here for  follow up. No new complaints  Complaining of some nausea which he attributes to possibly Hydrea        OBJECTIVE:  VITALS:  height is 5' 3\" (1.6 m) and weight is 131 lb 6.4 oz (59.6 kg). Her temporal temperature is 98 °F (36.7 °C). Her blood pressure is 154/73 (abnormal) and her pulse is 82. Physical Exam:  Performance Status: 0  Well developed, well nourished female  General: AAO to person, place, time, in no acute distress. Head and neck: PERRLA, EOMI . Sclera non icteric. Oropharynx: Clear. Neck: no JVD,  no adenopathy. Heart: Regular rate and regular rhythm. No murmur. Lungs: Clear to auscultation. Abdomen: Soft, non-tender;no masses, no organomegaly. Extremities: No edema,no cyanosis, no clubbing. Neurologic:Cranial nerves grossly intact. No focal motor or sensory deficits. Skin:  No rash. Medications  Prior to Admission medications    Medication Sig Start Date End Date Taking? Authorizing Provider   hydroxyurea (HYDREA) 500 MG chemo capsule Take 1 capsule by mouth daily 3/30/20 4/29/20 Yes Jasepr Smith APRN - CNP   Multiple Vitamins-Minerals (THERAPEUTIC MULTIVITAMIN-MINERALS) tablet Take 1 tablet by mouth daily   Yes Historical Provider, MD   atorvastatin (LIPITOR) 40 MG tablet Take 40 mg by mouth daily   Yes Historical Provider, MD   aspirin 81 MG EC tablet Take 81 mg by mouth daily   Yes Historical Provider, MD   nitrofurantoin (MACRODANTIN) 100 MG capsule Take 100 mg by mouth nightly    Yes Historical Provider, MD   traMADol (ULTRAM) 50 MG tablet Take 50 mg by mouth every 6 hours as needed for Pain.    Yes Historical Provider, MD   calcium carbonate (OSCAL) 500 MG TABS myeloproliferative disorder such as P vera. Rule out CML doubt     To check CBC, blood smear review, baseline EPO level, qualitative JAK2 mutation as well as BCR ABL by FISH. We will follow    Her EPO level was low at 1. Qualitative Jose Guadalupe 2 mutation was positive  BCR-ABL by FISH was negative    She has polycythemia vera  Hemoglobin was elevated at 17.9 on 3/9/2020    . Hemoglobin was elevated at 17.7 on 3/30/2020 and she underwent a  therapeutic phlebotomy and 250 cc of blood was removed under medical supervision. She was initiated on hydroxyurea 500 mg daily. Hemoglobin was elevated at 18.0  on 4/24/2020 and she will undergo a  therapeutic phlebotomy and 250 cc of blood will be  removed under medical supervision. Her Hydrea will be increased to 500 mg twice daily. She will be given PRN Zofran for nausea        Mylene Spivey. Vale Alford M.D., F.A.C.P.   Electronically signed 4/27/2020 at 12:37 PM

## 2020-04-27 ENCOUNTER — OFFICE VISIT (OUTPATIENT)
Dept: ONCOLOGY | Age: 85
End: 2020-04-27
Payer: MEDICARE

## 2020-04-27 ENCOUNTER — HOSPITAL ENCOUNTER (OUTPATIENT)
Dept: INFUSION THERAPY | Age: 85
Discharge: HOME | End: 2020-04-27
Payer: MEDICARE

## 2020-04-27 VITALS
TEMPERATURE: 98 F | HEART RATE: 82 BPM | BODY MASS INDEX: 23.28 KG/M2 | DIASTOLIC BLOOD PRESSURE: 73 MMHG | SYSTOLIC BLOOD PRESSURE: 154 MMHG | HEIGHT: 63 IN | WEIGHT: 131.4 LBS

## 2020-04-27 VITALS — HEART RATE: 77 BPM | DIASTOLIC BLOOD PRESSURE: 69 MMHG | SYSTOLIC BLOOD PRESSURE: 129 MMHG | TEMPERATURE: 98.2 F

## 2020-04-27 DIAGNOSIS — D45 POLYCYTHEMIA VERA (HCC): Primary | ICD-10-CM

## 2020-04-27 PROCEDURE — 99195 PHLEBOTOMY: CPT

## 2020-04-27 RX ORDER — 0.9 % SODIUM CHLORIDE 0.9 %
500 INTRAVENOUS SOLUTION INTRAVENOUS ONCE
Status: CANCELLED | OUTPATIENT
Start: 2020-04-27

## 2020-04-27 RX ORDER — HYDROXYUREA 500 MG/1
500 CAPSULE ORAL 2 TIMES DAILY
Qty: 60 CAPSULE | Refills: 1 | Status: SHIPPED
Start: 2020-04-27 | End: 2020-05-27 | Stop reason: SDUPTHER

## 2020-04-27 RX ORDER — ONDANSETRON 4 MG/1
4 TABLET, FILM COATED ORAL EVERY 12 HOURS PRN
Qty: 30 TABLET | Refills: 0 | Status: SHIPPED
Start: 2020-04-27 | End: 2022-01-31

## 2020-04-27 RX ORDER — 0.9 % SODIUM CHLORIDE 0.9 %
250 INTRAVENOUS SOLUTION INTRAVENOUS ONCE
Status: CANCELLED | OUTPATIENT
Start: 2020-04-27

## 2020-04-27 NOTE — PROGRESS NOTES
IV site initiated at [facility] with 20 gauge # intima. Therapeutic phlebotomy performed for 250 cc blood. Tolerated procedure and 15 minute observation without adverse reaction. Took fluids freely throughout observation. Vitals: There were no vitals taken for this visit. Verbal information provided to patient / family on procedure and post procedure care. Encouraged to call clinic during clinic hours and physician after clinic hours if questions or concerns arise.

## 2020-05-06 ENCOUNTER — HOSPITAL ENCOUNTER (OUTPATIENT)
Age: 85
Discharge: HOME | End: 2020-05-08
Payer: MEDICARE

## 2020-05-06 PROCEDURE — 87088 URINE BACTERIA CULTURE: CPT

## 2020-05-08 LAB — URINE CULTURE, ROUTINE: NORMAL

## 2020-05-26 ENCOUNTER — HOSPITAL ENCOUNTER (OUTPATIENT)
Dept: INFUSION THERAPY | Age: 85
Discharge: HOME | End: 2020-05-26
Payer: MEDICARE

## 2020-05-26 DIAGNOSIS — D45 POLYCYTHEMIA VERA (HCC): ICD-10-CM

## 2020-05-26 LAB
ALBUMIN SERPL-MCNC: 3.7 G/DL (ref 3.5–5.2)
ALP BLD-CCNC: 66 U/L (ref 35–104)
ALT SERPL-CCNC: 11 U/L (ref 0–32)
ANION GAP SERPL CALCULATED.3IONS-SCNC: 11 MMOL/L (ref 7–16)
ANISOCYTOSIS: ABNORMAL
AST SERPL-CCNC: 14 U/L (ref 0–31)
BASOPHILS ABSOLUTE: 0.09 E9/L (ref 0–0.2)
BASOPHILS RELATIVE PERCENT: 1.8 % (ref 0–2)
BILIRUB SERPL-MCNC: 0.7 MG/DL (ref 0–1.2)
BUN BLDV-MCNC: 20 MG/DL (ref 8–23)
BURR CELLS: ABNORMAL
CALCIUM SERPL-MCNC: 9.3 MG/DL (ref 8.6–10.2)
CHLORIDE BLD-SCNC: 101 MMOL/L (ref 98–107)
CO2: 22 MMOL/L (ref 22–29)
CREAT SERPL-MCNC: 1 MG/DL (ref 0.5–1)
EOSINOPHILS ABSOLUTE: 0 E9/L (ref 0.05–0.5)
EOSINOPHILS RELATIVE PERCENT: 0.6 % (ref 0–6)
GFR AFRICAN AMERICAN: >60
GFR NON-AFRICAN AMERICAN: 53 ML/MIN/1.73
GLUCOSE BLD-MCNC: 177 MG/DL (ref 74–99)
HCT VFR BLD CALC: 49.5 % (ref 34–48)
HEMOGLOBIN: 16 G/DL (ref 11.5–15.5)
LYMPHOCYTES ABSOLUTE: 0.94 E9/L (ref 1.5–4)
LYMPHOCYTES RELATIVE PERCENT: 18.4 % (ref 20–42)
MCH RBC QN AUTO: 30.9 PG (ref 26–35)
MCHC RBC AUTO-ENTMCNC: 32.3 % (ref 32–34.5)
MCV RBC AUTO: 95.7 FL (ref 80–99.9)
MONOCYTES ABSOLUTE: 0.1 E9/L (ref 0.1–0.95)
MONOCYTES RELATIVE PERCENT: 1.8 % (ref 2–12)
NEUTROPHILS ABSOLUTE: 4.06 E9/L (ref 1.8–7.3)
NEUTROPHILS RELATIVE PERCENT: 78.1 % (ref 43–80)
OVALOCYTES: ABNORMAL
PDW BLD-RTO: 22.5 FL (ref 11.5–15)
PLATELET # BLD: 148 E9/L (ref 130–450)
PMV BLD AUTO: 11.6 FL (ref 7–12)
POIKILOCYTES: ABNORMAL
POLYCHROMASIA: ABNORMAL
POTASSIUM SERPL-SCNC: 4.4 MMOL/L (ref 3.5–5)
RBC # BLD: 5.17 E12/L (ref 3.5–5.5)
SODIUM BLD-SCNC: 134 MMOL/L (ref 132–146)
TOTAL PROTEIN: 6.1 G/DL (ref 6.4–8.3)
WBC # BLD: 5.2 E9/L (ref 4.5–11.5)

## 2020-05-26 PROCEDURE — 80053 COMPREHEN METABOLIC PANEL: CPT

## 2020-05-26 PROCEDURE — 36415 COLL VENOUS BLD VENIPUNCTURE: CPT

## 2020-05-26 PROCEDURE — 85025 COMPLETE CBC W/AUTO DIFF WBC: CPT

## 2020-05-26 NOTE — PROGRESS NOTES
[address] [facility]. [facility] Name: Shakeel Mccormack  YOB: 1935  Date of evaluation: 5/27/2020  Primary Care Physician: Shakir Fountain DO  Reason for evaluation:   Chief Complaint   Patient presents with    Follow-up     Polycythemia vera        Subjective: Here for  follow up. No new complaints        OBJECTIVE:  VITALS:  height is 5' 3\" (1.6 m) and weight is 135 lb 6.4 oz (61.4 kg). Her temporal temperature is 98 °F (36.7 °C). Her blood pressure is 157/73 (abnormal) and her pulse is 76. Her oxygen saturation is 94%. Physical Exam:  Performance Status: 0  Well developed, well nourished female  General: AAO to person, place, time, in no acute distress. Head and neck: PERRLA, EOMI . Sclera non icteric. Oropharynx: Clear. Neck: no JVD,  no adenopathy. Heart: Regular rate and regular rhythm. No murmur. Lungs: Clear to auscultation. Abdomen: Soft, non-tender;no masses, no organomegaly. Extremities: No edema,no cyanosis, no clubbing. Neurologic:Cranial nerves grossly intact. No focal motor or sensory deficits. Skin:  No rash. Medications  Prior to Admission medications    Medication Sig Start Date End Date Taking?  Authorizing Provider   hydroxyurea (HYDREA) 500 MG chemo capsule Take 1 capsule by mouth 2 times daily 4/27/20 6/26/20 Yes Margie Alfaro MD   ondansetron [facility]) 4 MG tablet Take 1 tablet by mouth every 12 hours as needed for Nausea or Vomiting 4/27/20  Yes Margie Alfaro MD   Multiple Vitamins-Minerals (THERAPEUTIC MULTIVITAMIN-MINERALS) tablet Take 1 tablet by mouth daily   Yes Historical Provider, MD   atorvastatin (LIPITOR) 40 MG tablet Take 40 mg by mouth daily   Yes Historical Provider, MD   aspirin 81 MG EC tablet Take 81 mg by mouth daily   Yes Historical Provider, MD   nitrofurantoin (MACRODANTIN) 100 MG capsule Take 100 mg by mouth nightly    Yes Historical Provider, MD   traMADol (ULTRAM) 50 MG tablet Take 50 mg by

## 2020-05-27 ENCOUNTER — HOSPITAL ENCOUNTER (OUTPATIENT)
Dept: INFUSION THERAPY | Age: 85
Discharge: HOME | End: 2020-05-27
Payer: MEDICARE

## 2020-05-27 ENCOUNTER — OFFICE VISIT (OUTPATIENT)
Dept: ONCOLOGY | Age: 85
End: 2020-05-27
Payer: MEDICARE

## 2020-05-27 VITALS — SYSTOLIC BLOOD PRESSURE: 109 MMHG | DIASTOLIC BLOOD PRESSURE: 56 MMHG | HEART RATE: 66 BPM

## 2020-05-27 VITALS
HEIGHT: 63 IN | DIASTOLIC BLOOD PRESSURE: 73 MMHG | BODY MASS INDEX: 23.99 KG/M2 | HEART RATE: 76 BPM | OXYGEN SATURATION: 94 % | SYSTOLIC BLOOD PRESSURE: 157 MMHG | WEIGHT: 135.4 LBS | TEMPERATURE: 98 F

## 2020-05-27 DIAGNOSIS — D45 POLYCYTHEMIA VERA (HCC): Primary | ICD-10-CM

## 2020-05-27 PROCEDURE — 36415 COLL VENOUS BLD VENIPUNCTURE: CPT

## 2020-05-27 PROCEDURE — 99195 PHLEBOTOMY: CPT

## 2020-05-27 RX ORDER — 0.9 % SODIUM CHLORIDE 0.9 %
500 INTRAVENOUS SOLUTION INTRAVENOUS ONCE
Status: CANCELLED | OUTPATIENT
Start: 2020-05-27

## 2020-05-27 RX ORDER — 0.9 % SODIUM CHLORIDE 0.9 %
500 INTRAVENOUS SOLUTION INTRAVENOUS ONCE
Status: DISCONTINUED | OUTPATIENT
Start: 2020-05-27 | End: 2020-05-27 | Stop reason: CLARIF

## 2020-05-27 RX ORDER — 0.9 % SODIUM CHLORIDE 0.9 %
250 INTRAVENOUS SOLUTION INTRAVENOUS ONCE
Status: CANCELLED | OUTPATIENT
Start: 2020-05-27

## 2020-05-27 RX ORDER — HYDROXYUREA 500 MG/1
500 CAPSULE ORAL 2 TIMES DAILY
Qty: 60 CAPSULE | Refills: 1 | Status: SHIPPED
Start: 2020-05-27 | End: 2020-07-20 | Stop reason: SDUPTHER

## 2020-05-27 NOTE — PROGRESS NOTES
IV site initiated at right arm with 20 gauge I inch intima. Therapeutic phlebotomy performed for 250 cc blood. Tolerated procedure and 15 minute observation without adverse reaction. Took fluids freely throughout observation. Vitals: There were no vitals taken for this visit. Verbal information provided to patient / family on procedure and post procedure care. Encouraged to call clinic during clinic hours and physician after clinic hours if questions or concerns arise.

## 2020-06-30 ENCOUNTER — HOSPITAL ENCOUNTER (OUTPATIENT)
Dept: INFUSION THERAPY | Age: 85
Discharge: HOME | End: 2020-06-30
Payer: MEDICARE

## 2020-06-30 DIAGNOSIS — D45 POLYCYTHEMIA VERA (HCC): ICD-10-CM

## 2020-06-30 LAB
ACANTHOCYTES: ABNORMAL
ALBUMIN SERPL-MCNC: 3.6 G/DL (ref 3.5–5.2)
ALP BLD-CCNC: 64 U/L (ref 35–104)
ALT SERPL-CCNC: 12 U/L (ref 0–32)
ANION GAP SERPL CALCULATED.3IONS-SCNC: 13 MMOL/L (ref 7–16)
ANISOCYTOSIS: ABNORMAL
AST SERPL-CCNC: 17 U/L (ref 0–31)
BASOPHILS ABSOLUTE: 0 E9/L (ref 0–0.2)
BASOPHILS RELATIVE PERCENT: 1.1 % (ref 0–2)
BILIRUB SERPL-MCNC: 0.5 MG/DL (ref 0–1.2)
BUN BLDV-MCNC: 22 MG/DL (ref 8–23)
CALCIUM SERPL-MCNC: 9.4 MG/DL (ref 8.6–10.2)
CHLORIDE BLD-SCNC: 102 MMOL/L (ref 98–107)
CO2: 21 MMOL/L (ref 22–29)
CREAT SERPL-MCNC: 1 MG/DL (ref 0.5–1)
EOSINOPHILS ABSOLUTE: 0 E9/L (ref 0.05–0.5)
EOSINOPHILS RELATIVE PERCENT: 0.8 % (ref 0–6)
GFR AFRICAN AMERICAN: >60
GFR NON-AFRICAN AMERICAN: 53 ML/MIN/1.73
GLUCOSE BLD-MCNC: 108 MG/DL (ref 74–99)
HCT VFR BLD CALC: 37.9 % (ref 34–48)
HEMOGLOBIN: 12.8 G/DL (ref 11.5–15.5)
LYMPHOCYTES ABSOLUTE: 1.41 E9/L (ref 1.5–4)
LYMPHOCYTES RELATIVE PERCENT: 29.8 % (ref 20–42)
MCH RBC QN AUTO: 35.4 PG (ref 26–35)
MCHC RBC AUTO-ENTMCNC: 33.8 % (ref 32–34.5)
MCV RBC AUTO: 104.7 FL (ref 80–99.9)
MONOCYTES ABSOLUTE: 0.19 E9/L (ref 0.1–0.95)
MONOCYTES RELATIVE PERCENT: 3.5 % (ref 2–12)
NEUTROPHILS ABSOLUTE: 3.15 E9/L (ref 1.8–7.3)
NEUTROPHILS RELATIVE PERCENT: 66.7 % (ref 43–80)
OVALOCYTES: ABNORMAL
PDW BLD-RTO: 23 FL (ref 11.5–15)
PLATELET # BLD: 153 E9/L (ref 130–450)
PMV BLD AUTO: 10.9 FL (ref 7–12)
POIKILOCYTES: ABNORMAL
POTASSIUM SERPL-SCNC: 4.3 MMOL/L (ref 3.5–5)
RBC # BLD: 3.62 E12/L (ref 3.5–5.5)
SODIUM BLD-SCNC: 136 MMOL/L (ref 132–146)
TOTAL PROTEIN: 6 G/DL (ref 6.4–8.3)
WBC # BLD: 4.7 E9/L (ref 4.5–11.5)

## 2020-06-30 PROCEDURE — 85025 COMPLETE CBC W/AUTO DIFF WBC: CPT

## 2020-06-30 PROCEDURE — 80053 COMPREHEN METABOLIC PANEL: CPT

## 2020-06-30 PROCEDURE — 36415 COLL VENOUS BLD VENIPUNCTURE: CPT

## 2020-06-30 NOTE — PROGRESS NOTES
[address] [facility]. [facility] [place]        Pt Name: Pk Rothman  YOB: 1935  Date of evaluation: 6/30/2020  Primary Care Physician: Rayo Arzate DO  Reason for evaluation:   Chief Complaint   Patient presents with    Follow-up     Polycythemia vera        Subjective: Here for  follow up. No new complaints        OBJECTIVE:  VITALS:  vitals were not taken for this visit. Physical Exam:  Performance Status: 0  Well developed, well nourished female  General: AAO to person, place, time, in no acute distress. Head and neck: PERRLA, EOMI . Sclera non icteric. Oropharynx: Clear. Neck: no JVD,  no adenopathy. Heart: Regular rate and regular rhythm. No murmur. Lungs: Clear to auscultation. Abdomen: Soft, non-tender;no masses, no organomegaly. Extremities: No edema,no cyanosis, no clubbing. Neurologic:Cranial nerves grossly intact. No focal motor or sensory deficits. Skin:  No rash. Medications  Prior to Admission medications    Medication Sig Start Date End Date Taking? Authorizing Provider   hydroxyurea (HYDREA) 500 MG chemo capsule Take 1 capsule by mouth 2 times daily 5/27/20 7/26/20  Zarina Jacobo MD   ondansetron [facility]) 4 MG tablet Take 1 tablet by mouth every 12 hours as needed for Nausea or Vomiting 4/27/20   Zarina Jacobo MD   Multiple Vitamins-Minerals (THERAPEUTIC MULTIVITAMIN-MINERALS) tablet Take 1 tablet by mouth daily    Historical Provider, MD   atorvastatin (LIPITOR) 40 MG tablet Take 40 mg by mouth daily    Historical Provider, MD   aspirin 81 MG EC tablet Take 81 mg by mouth daily    Historical Provider, MD   nitrofurantoin (MACRODANTIN) 100 MG capsule Take 100 mg by mouth nightly     Historical Provider, MD   traMADol (ULTRAM) 50 MG tablet Take 50 mg by mouth every 6 hours as needed for Pain.     Historical Provider, MD   calcium carbonate (OSCAL) 500 MG TABS tablet Take 500 mg by mouth daily Indications: plus 10 mcg D3 Patient is taking 3 tablets daily    Historical Provider, MD   lisinopril (PRINIVIL;ZESTRIL) 10 MG tablet Take 10 mg by mouth daily    Historical Provider, MD   escitalopram (LEXAPRO) 20 MG tablet Take 20 mg by mouth daily    Historical Provider, MD   pantoprazole (PROTONIX) 40 MG tablet Take 40 mg by mouth daily    Historical Provider, MD   colestipol (COLESTID) 1 g tablet Take 2 g by mouth 2 times daily     Historical Provider, MD    Scheduled Meds:  Continuous Infusions:  PRN Meds:.        Recent Laboratory Data-     Lab Results   Component Value Date    WBC 5.2 05/26/2020    HGB 16.0 (H) 05/26/2020    HCT 49.5 (H) 05/26/2020    MCV 95.7 05/26/2020     05/26/2020    LYMPHOPCT 18.4 (L) 05/26/2020    RBC 5.17 05/26/2020    MCH 30.9 05/26/2020    MCHC 32.3 05/26/2020    RDW 22.5 (H) 05/26/2020    NEUTOPHILPCT 78.1 05/26/2020    MONOPCT 1.8 (L) 05/26/2020    BASOPCT 1.8 05/26/2020    NEUTROABS 4.06 05/26/2020    LYMPHSABS 0.94 (L) 05/26/2020    MONOSABS 0.10 05/26/2020    EOSABS 0.00 (L) 05/26/2020    BASOSABS 0.09 05/26/2020       Lab Results   Component Value Date     05/26/2020    K 4.4 05/26/2020     05/26/2020    CO2 22 05/26/2020    BUN 20 05/26/2020    CREATININE 1.0 05/26/2020    GLUCOSE 177 (H) 05/26/2020    CALCIUM 9.3 05/26/2020    PROT 6.1 (L) 05/26/2020    LABALBU 3.7 05/26/2020    BILITOT 0.7 05/26/2020    ALKPHOS 66 05/26/2020    AST 14 05/26/2020    ALT 11 05/26/2020    LABGLOM 53 05/26/2020    GFRAA >60 05/26/2020         QUALITATIVE JAK2 MUTATION:  POSITIVE.      BCR/ABL1 Interphase FISH:  NEGATIVE          Radiology-  No results found. ASSESSMENT/PLAN :  Polycythemia with borderline neutrophilic leukocytosis.     She has no clinical suggestion of reactive compensatory secondary polycythemia with absence of prior history of smoking COPD or sleep apnea     Rule out primary myeloproliferative disorder such as P vera.   Rule out CML doubt     To check CBC, blood smear review, baseline EPO level, qualitative JAK2 mutation as well as BCR ABL by FISH. We will follow    Her EPO level was low at 1. Qualitative Jose Guadalupe 2 mutation was positive  BCR-ABL by FISH was negative    She has polycythemia vera  Hemoglobin was elevated at 17.9 on 3/9/2020    . Hemoglobin was elevated at 17.7 on 3/30/2020 and she underwent a  therapeutic phlebotomy and 250 cc of blood was removed under medical supervision. She was initiated on hydroxyurea 500 mg daily. Hemoglobin was elevated at 18.0  on 4/24/2020 and she will undergo a  therapeutic phlebotomy and 250 cc of blood will be  removed under medical supervision. Her Hydrea was increased to 500 mg twice daily. She was given PRN Zofran for nausea    Hgb was 16 on 5/27/2020 and she   She is to undergo phlebotomy with a target hemoglobin of 15  She will be maintained on Hydrea 500 mg twice daily. Hgb was   On 6/30/2020  She is to undergo phlebotomy with a target hemoglobin of 15  She will be maintained on Hydrea 500 mg twice daily. Carmelita Yee. Laquita Carlin M.D., F.A.C.P.   Electronically signed 6/30/2020 at 2:17 PM

## 2020-07-01 ENCOUNTER — HOSPITAL ENCOUNTER (OUTPATIENT)
Dept: INFUSION THERAPY | Age: 85
End: 2020-07-01
Payer: MEDICARE

## 2020-07-01 ENCOUNTER — OFFICE VISIT (OUTPATIENT)
Dept: ONCOLOGY | Age: 85
End: 2020-07-01

## 2020-07-20 RX ORDER — HYDROXYUREA 500 MG/1
500 CAPSULE ORAL 2 TIMES DAILY
Qty: 60 CAPSULE | Refills: 1 | Status: SHIPPED | OUTPATIENT
Start: 2020-07-20 | End: 2020-09-23 | Stop reason: SDUPTHER

## 2020-07-21 LAB
AVERAGE GLUCOSE: NORMAL
HBA1C MFR BLD: 5.8 %
TSH SERPL DL<=0.05 MIU/L-ACNC: 1.8 UIU/ML

## 2020-07-28 ENCOUNTER — HOSPITAL ENCOUNTER (OUTPATIENT)
Dept: INFUSION THERAPY | Age: 85
Discharge: HOME | End: 2020-07-28
Payer: MEDICARE

## 2020-07-28 DIAGNOSIS — D45 POLYCYTHEMIA VERA (HCC): ICD-10-CM

## 2020-07-28 LAB
ALBUMIN SERPL-MCNC: 3.7 G/DL (ref 3.5–5.2)
ALP BLD-CCNC: 60 U/L (ref 35–104)
ALT SERPL-CCNC: 13 U/L (ref 0–32)
ANION GAP SERPL CALCULATED.3IONS-SCNC: 11 MMOL/L (ref 7–16)
ANISOCYTOSIS: ABNORMAL
AST SERPL-CCNC: 17 U/L (ref 0–31)
BASOPHILS ABSOLUTE: 0.11 E9/L (ref 0–0.2)
BASOPHILS RELATIVE PERCENT: 2.6 % (ref 0–2)
BILIRUB SERPL-MCNC: 0.8 MG/DL (ref 0–1.2)
BUN BLDV-MCNC: 19 MG/DL (ref 8–23)
CALCIUM SERPL-MCNC: 9.4 MG/DL (ref 8.6–10.2)
CHLORIDE BLD-SCNC: 106 MMOL/L (ref 98–107)
CO2: 22 MMOL/L (ref 22–29)
CREAT SERPL-MCNC: 1 MG/DL (ref 0.5–1)
EOSINOPHILS ABSOLUTE: 0.04 E9/L (ref 0.05–0.5)
EOSINOPHILS RELATIVE PERCENT: 0.9 % (ref 0–6)
GFR AFRICAN AMERICAN: >60
GFR NON-AFRICAN AMERICAN: 53 ML/MIN/1.73
GLUCOSE BLD-MCNC: 112 MG/DL (ref 74–99)
HCT VFR BLD CALC: 33 % (ref 34–48)
HEMOGLOBIN: 11.7 G/DL (ref 11.5–15.5)
LYMPHOCYTES ABSOLUTE: 0.8 E9/L (ref 1.5–4)
LYMPHOCYTES RELATIVE PERCENT: 19.1 % (ref 20–42)
MCH RBC QN AUTO: 40.6 PG (ref 26–35)
MCHC RBC AUTO-ENTMCNC: 35.5 % (ref 32–34.5)
MCV RBC AUTO: 114.6 FL (ref 80–99.9)
MONOCYTES ABSOLUTE: 0 E9/L (ref 0.1–0.95)
MONOCYTES RELATIVE PERCENT: 3.1 % (ref 2–12)
NEUTROPHILS ABSOLUTE: 3.23 E9/L (ref 1.8–7.3)
NEUTROPHILS RELATIVE PERCENT: 77.4 % (ref 43–80)
OVALOCYTES: ABNORMAL
PDW BLD-RTO: 20.7 FL (ref 11.5–15)
PLATELET # BLD: 143 E9/L (ref 130–450)
PMV BLD AUTO: 10.3 FL (ref 7–12)
POIKILOCYTES: ABNORMAL
POTASSIUM SERPL-SCNC: 4.2 MMOL/L (ref 3.5–5)
RBC # BLD: 2.88 E12/L (ref 3.5–5.5)
SODIUM BLD-SCNC: 139 MMOL/L (ref 132–146)
TOTAL PROTEIN: 6.2 G/DL (ref 6.4–8.3)
WBC # BLD: 4.2 E9/L (ref 4.5–11.5)

## 2020-07-28 PROCEDURE — 80053 COMPREHEN METABOLIC PANEL: CPT

## 2020-07-28 PROCEDURE — 36415 COLL VENOUS BLD VENIPUNCTURE: CPT

## 2020-07-28 PROCEDURE — 85025 COMPLETE CBC W/AUTO DIFF WBC: CPT

## 2020-07-28 NOTE — PROGRESS NOTES
[address] [facility]. [facility]        Pt Name: Mary Bae  YOB: 1935  Date of evaluation: 7/29/2020  Primary Care Physician: Devante Barrow DO  Reason for evaluation:   Chief Complaint   Patient presents with    Follow-up     Polycythemia vera        Subjective: Here for  follow up. No new complaints  Tolerating Hydrea well. OBJECTIVE:  VITALS:  weight is 139 lb 4.8 oz (63.2 kg). Her temporal temperature is 97.5 °F (36.4 °C). Her blood pressure is 147/69 (abnormal) and her pulse is 68. Her oxygen saturation is 96%. Physical Exam:  Performance Status: 0  Well developed, well nourished female  General: AAO to person, place, time, in no acute distress. Head and neck: PERRLA, EOMI . Sclera non icteric. Oropharynx: Clear. Neck: no JVD,  no adenopathy. Heart: Regular rate and regular rhythm. No murmur. Lungs: Clear to auscultation. Abdomen: Soft, non-tender;no masses, no organomegaly. Extremities: No edema,no cyanosis, no clubbing. Neurologic:Cranial nerves grossly intact. No focal motor or sensory deficits. Skin:  No rash. Medications  Prior to Admission medications    Medication Sig Start Date End Date Taking?  Authorizing Provider   hydroxyurea (HYDREA) 500 MG chemo capsule Take 1 capsule by mouth 2 times daily 7/20/20 9/18/20 Yes AMADA Ricks - CNP   ondansetron (ZOFRAN) 4 MG tablet Take 1 tablet by mouth every 12 hours as needed for Nausea or Vomiting 4/27/20  Yes Shawn Hudson MD   Multiple Vitamins-Minerals (THERAPEUTIC MULTIVITAMIN-MINERALS) tablet Take 1 tablet by mouth daily   Yes Historical Provider, MD   atorvastatin (LIPITOR) 40 MG tablet Take 40 mg by mouth daily   Yes Historical Provider, MD   aspirin 81 MG EC tablet Take 81 mg by mouth daily   Yes Historical Provider, MD   nitrofurantoin (MACRODANTIN) 100 MG capsule Take 100 mg by mouth nightly    Yes Historical Provider, MD   traMADol (ULTRAM) 50 MG tablet Take 50 mg by mouth every 6 hours as needed for Pain. Yes Historical Provider, MD   calcium carbonate (OSCAL) 500 MG TABS tablet Take 500 mg by mouth daily Indications: plus 10 mcg D3 Patient is taking 3 tablets daily   Yes Historical Provider, MD   lisinopril (PRINIVIL;ZESTRIL) 10 MG tablet Take 10 mg by mouth daily   Yes Historical Provider, MD   escitalopram (LEXAPRO) 20 MG tablet Take 20 mg by mouth daily   Yes Historical Provider, MD   pantoprazole (PROTONIX) 40 MG tablet Take 40 mg by mouth daily   Yes Historical Provider, MD   colestipol (COLESTID) 1 g tablet Take 2 g by mouth 2 times daily    Yes Historical Provider, MD    Scheduled Meds:  Continuous Infusions:  PRN Meds:.        Recent Laboratory Data-     Lab Results   Component Value Date    WBC 4.2 (L) 07/28/2020    HGB 11.7 07/28/2020    HCT 33.0 (L) 07/28/2020    .6 (H) 07/28/2020     07/28/2020    LYMPHOPCT 19.1 (L) 07/28/2020    RBC 2.88 (L) 07/28/2020    MCH 40.6 (H) 07/28/2020    MCHC 35.5 (H) 07/28/2020    RDW 20.7 (H) 07/28/2020    NEUTOPHILPCT 77.4 07/28/2020    MONOPCT 3.1 07/28/2020    BASOPCT 2.6 (H) 07/28/2020    NEUTROABS 3.23 07/28/2020    LYMPHSABS 0.80 (L) 07/28/2020    MONOSABS 0.00 (L) 07/28/2020    EOSABS 0.04 (L) 07/28/2020    BASOSABS 0.11 07/28/2020       Lab Results   Component Value Date     07/28/2020    K 4.2 07/28/2020     07/28/2020    CO2 22 07/28/2020    BUN 19 07/28/2020    CREATININE 1.0 07/28/2020    GLUCOSE 112 (H) 07/28/2020    CALCIUM 9.4 07/28/2020    PROT 6.2 (L) 07/28/2020    LABALBU 3.7 07/28/2020    BILITOT 0.8 07/28/2020    ALKPHOS 60 07/28/2020    AST 17 07/28/2020    ALT 13 07/28/2020    LABGLOM 53 07/28/2020    GFRAA >60 07/28/2020         QUALITATIVE JAK2 MUTATION:  POSITIVE.      BCR/ABL1 Interphase FISH:  NEGATIVE          Radiology-  No results found.           ASSESSMENT/PLAN :  Polycythemia with borderline neutrophilic leukocytosis.     She has no clinical suggestion of reactive compensatory secondary polycythemia with absence of prior history of smoking COPD or sleep apnea     Rule out primary myeloproliferative disorder such as P vera. Rule out CML doubt     To check CBC, blood smear review, baseline EPO level, qualitative JAK2 mutation as well as BCR ABL by FISH. We will follow    Her EPO level was low at 1. Qualitative Jose Guadalupe 2 mutation was positive  BCR-ABL by FISH was negative    She has polycythemia vera  Hemoglobin was elevated at 17.9 on 3/9/2020    . Hemoglobin was elevated at 17.7 on 3/30/2020 and she underwent a  therapeutic phlebotomy and 250 cc of blood was removed under medical supervision. She was initiated on hydroxyurea 500 mg daily. Hemoglobin was elevated at 18.0  on 4/24/2020 and she will undergo a  therapeutic phlebotomy and 250 cc of blood will be  removed under medical supervision. Her Hydrea was increased to 500 mg twice daily. She was given PRN Zofran for nausea    Hgb was 16 on 5/27/2020 and she   She is to undergo phlebotomy with a target hemoglobin of 15  She will be maintained on Hydrea 500 mg twice daily. Hgb was 11.7  on  7/28/2020  Hydrea dose will be decreased to 500 mg daily        Dutch Norman M.D., F.A.C.P.   Electronically signed 7/29/2020 at 1:17 PM

## 2020-07-29 ENCOUNTER — OFFICE VISIT (OUTPATIENT)
Dept: ONCOLOGY | Age: 85
End: 2020-07-29
Payer: MEDICARE

## 2020-07-29 ENCOUNTER — HOSPITAL ENCOUNTER (OUTPATIENT)
Dept: INFUSION THERAPY | Age: 85
Discharge: HOME | End: 2020-07-29
Payer: MEDICARE

## 2020-07-29 VITALS
SYSTOLIC BLOOD PRESSURE: 147 MMHG | DIASTOLIC BLOOD PRESSURE: 69 MMHG | TEMPERATURE: 97.5 F | BODY MASS INDEX: 24.68 KG/M2 | WEIGHT: 139.3 LBS | HEART RATE: 68 BPM | OXYGEN SATURATION: 96 %

## 2020-07-29 PROCEDURE — 99212 OFFICE O/P EST SF 10 MIN: CPT

## 2020-08-04 ENCOUNTER — HOSPITAL ENCOUNTER (OUTPATIENT)
Dept: MAMMOGRAPHY | Age: 85
Discharge: HOME | End: 2020-08-06
Payer: MEDICARE

## 2020-08-04 ENCOUNTER — APPOINTMENT (OUTPATIENT)
Dept: MAMMOGRAPHY | Age: 85
End: 2020-08-04
Payer: MEDICARE

## 2020-08-04 PROCEDURE — 77067 SCR MAMMO BI INCL CAD: CPT

## 2020-08-18 LAB — VITAMIN B-12: 518

## 2020-08-24 ENCOUNTER — HOSPITAL ENCOUNTER (OUTPATIENT)
Dept: ULTRASOUND IMAGING | Age: 85
Discharge: HOME | End: 2020-08-24
Payer: MEDICARE

## 2020-08-24 ENCOUNTER — HOSPITAL ENCOUNTER (OUTPATIENT)
Dept: MAMMOGRAPHY | Age: 85
Discharge: HOME | End: 2020-08-26
Payer: MEDICARE

## 2020-08-24 PROCEDURE — 76642 ULTRASOUND BREAST LIMITED: CPT

## 2020-08-24 PROCEDURE — G0279 TOMOSYNTHESIS, MAMMO: HCPCS

## 2020-08-25 ENCOUNTER — HOSPITAL ENCOUNTER (OUTPATIENT)
Dept: INFUSION THERAPY | Age: 85
Discharge: HOME | End: 2020-08-25
Payer: MEDICARE

## 2020-08-25 DIAGNOSIS — D45 POLYCYTHEMIA VERA (HCC): ICD-10-CM

## 2020-08-25 LAB
ALBUMIN SERPL-MCNC: 3.8 G/DL (ref 3.5–5.2)
ALP BLD-CCNC: 62 U/L (ref 35–104)
ALT SERPL-CCNC: 13 U/L (ref 0–32)
ANION GAP SERPL CALCULATED.3IONS-SCNC: 10 MMOL/L (ref 7–16)
ANISOCYTOSIS: ABNORMAL
AST SERPL-CCNC: 16 U/L (ref 0–31)
BASOPHILS ABSOLUTE: 0.07 E9/L (ref 0–0.2)
BASOPHILS RELATIVE PERCENT: 0.9 % (ref 0–2)
BILIRUB SERPL-MCNC: 0.8 MG/DL (ref 0–1.2)
BUN BLDV-MCNC: 18 MG/DL (ref 8–23)
CALCIUM SERPL-MCNC: 9.3 MG/DL (ref 8.6–10.2)
CHLORIDE BLD-SCNC: 105 MMOL/L (ref 98–107)
CO2: 21 MMOL/L (ref 22–29)
CREAT SERPL-MCNC: 1.1 MG/DL (ref 0.5–1)
EOSINOPHILS ABSOLUTE: 0 E9/L (ref 0.05–0.5)
EOSINOPHILS RELATIVE PERCENT: 0.8 % (ref 0–6)
GFR AFRICAN AMERICAN: 57
GFR NON-AFRICAN AMERICAN: 47 ML/MIN/1.73
GLUCOSE BLD-MCNC: 149 MG/DL (ref 74–99)
HCT VFR BLD CALC: 37.5 % (ref 34–48)
HEMOGLOBIN: 12.7 G/DL (ref 11.5–15.5)
LYMPHOCYTES ABSOLUTE: 0.59 E9/L (ref 1.5–4)
LYMPHOCYTES RELATIVE PERCENT: 7.9 % (ref 20–42)
MCH RBC QN AUTO: 42.3 PG (ref 26–35)
MCHC RBC AUTO-ENTMCNC: 33.9 % (ref 32–34.5)
MCV RBC AUTO: 125 FL (ref 80–99.9)
MONOCYTES ABSOLUTE: 0 E9/L (ref 0.1–0.95)
MONOCYTES RELATIVE PERCENT: 2.8 % (ref 2–12)
NEUTROPHILS ABSOLUTE: 6.73 E9/L (ref 1.8–7.3)
NEUTROPHILS RELATIVE PERCENT: 91.2 % (ref 43–80)
OVALOCYTES: ABNORMAL
PDW BLD-RTO: 13 FL (ref 11.5–15)
PLATELET # BLD: 207 E9/L (ref 130–450)
PMV BLD AUTO: 10.1 FL (ref 7–12)
POIKILOCYTES: ABNORMAL
POLYCHROMASIA: ABNORMAL
POTASSIUM SERPL-SCNC: 4.7 MMOL/L (ref 3.5–5)
RBC # BLD: 3 E12/L (ref 3.5–5.5)
SODIUM BLD-SCNC: 136 MMOL/L (ref 132–146)
TEAR DROP CELLS: ABNORMAL
TOTAL PROTEIN: 6.3 G/DL (ref 6.4–8.3)
WBC # BLD: 7.4 E9/L (ref 4.5–11.5)

## 2020-08-25 PROCEDURE — 85025 COMPLETE CBC W/AUTO DIFF WBC: CPT

## 2020-08-25 PROCEDURE — 80053 COMPREHEN METABOLIC PANEL: CPT

## 2020-08-25 NOTE — PROGRESS NOTES
[address] [facility]. [facility]        Pt Name: Bakari Solis  YOB: 1935  Date of evaluation: 8/25/2020  Primary Care Physician: Best Lawrence DO  Reason for evaluation:   Chief Complaint   Patient presents with    Follow-up     Polycythemia vera        Subjective: Here for  follow up. No new complaints. On Hydrea 500 mg daily. Tolerating Hydrea well. OBJECTIVE:  VITALS:  vitals were not taken for this visit. Physical Exam:  Performance Status: 0  Well developed, well nourished female  General: AAO to person, place, time, in no acute distress. Head and neck: PERRLA, EOMI . Sclera non icteric. Oropharynx: Clear. Neck: no JVD,  no adenopathy. Heart: Regular rate and regular rhythm. No murmur. Lungs: Clear to auscultation. Abdomen: Soft, non-tender;no masses, no organomegaly. Extremities: No edema,no cyanosis, no clubbing. Neurologic:Cranial nerves grossly intact. No focal motor or sensory deficits. Skin:  No rash. Medications  Prior to Admission medications    Medication Sig Start Date End Date Taking? Authorizing Provider   hydroxyurea (HYDREA) 500 MG chemo capsule Take 1 capsule by mouth 2 times daily 7/20/20 9/18/20  AMADA Batista - DONNIE   ondansetron [facility]) 4 MG tablet Take 1 tablet by mouth every 12 hours as needed for Nausea or Vomiting 4/27/20   Abiodun Johnson MD   Multiple Vitamins-Minerals (THERAPEUTIC MULTIVITAMIN-MINERALS) tablet Take 1 tablet by mouth daily    Historical Provider, MD   atorvastatin (LIPITOR) 40 MG tablet Take 40 mg by mouth daily    Historical Provider, MD   aspirin 81 MG EC tablet Take 81 mg by mouth daily    Historical Provider, MD   nitrofurantoin (MACRODANTIN) 100 MG capsule Take 100 mg by mouth nightly     Historical Provider, MD   traMADol (ULTRAM) 50 MG tablet Take 50 mg by mouth every 6 hours as needed for Pain.     Historical Provider, MD   calcium carbonate (OSCAL) 500 MG TABS tablet Take 500 mg by mouth daily Indications: plus 10 mcg D3 Patient is taking 3 tablets daily    Historical Provider, MD   lisinopril (PRINIVIL;ZESTRIL) 10 MG tablet Take 10 mg by mouth daily    Historical Provider, MD   escitalopram (LEXAPRO) 20 MG tablet Take 20 mg by mouth daily    Historical Provider, MD   pantoprazole (PROTONIX) 40 MG tablet Take 40 mg by mouth daily    Historical Provider, MD   colestipol (COLESTID) 1 g tablet Take 2 g by mouth 2 times daily     Historical Provider, MD    Scheduled Meds:  Continuous Infusions:  PRN Meds:.        Recent Laboratory Data-     Lab Results   Component Value Date    WBC 7.4 08/25/2020    HGB 12.7 08/25/2020    HCT 37.5 08/25/2020    .0 (H) 08/25/2020     08/25/2020    LYMPHOPCT 7.9 (L) 08/25/2020    RBC 3.00 (L) 08/25/2020    MCH 42.3 (H) 08/25/2020    MCHC 33.9 08/25/2020    RDW 13.0 08/25/2020    NEUTOPHILPCT 91.2 (H) 08/25/2020    MONOPCT 2.8 08/25/2020    BASOPCT 0.9 08/25/2020    NEUTROABS 6.73 08/25/2020    LYMPHSABS 0.59 (L) 08/25/2020    MONOSABS 0.00 (L) 08/25/2020    EOSABS 0.00 (L) 08/25/2020    BASOSABS 0.07 08/25/2020       Lab Results   Component Value Date     08/25/2020    K 4.7 08/25/2020     08/25/2020    CO2 21 (L) 08/25/2020    BUN 18 08/25/2020    CREATININE 1.1 (H) 08/25/2020    GLUCOSE 149 (H) 08/25/2020    CALCIUM 9.3 08/25/2020    PROT 6.3 (L) 08/25/2020    LABALBU 3.8 08/25/2020    BILITOT 0.8 08/25/2020    ALKPHOS 62 08/25/2020    AST 16 08/25/2020    ALT 13 08/25/2020    LABGLOM 47 08/25/2020    GFRAA 57 08/25/2020         QUALITATIVE JAK2 MUTATION:  POSITIVE.      BCR/ABL1 Interphase FISH:  NEGATIVE          Radiology-  US RIGHT BREAST:  8/24/2020  1. 2 tiny cystic lesions including simple cyst at the 3 o'clock position and    mildly complex cyst at the 6 o'clock patient vision.  Both of these measure 4    mm.     2. Otherwise, unremarkable right breast ultrasound and diagnostic mammogram.    Please note that a negative report should not delay biopsy if there is a    clinically suspicious palpable abnormality.  Unless otherwise clinically    indicated, follow-up mammography and ultrasound of the right breast in 6    months is suggested.         BIRADS:    BIRADS - CATEGORY 3         Findings are probably benign. A short interval follow-up is recommended in 6    months.         OVERALL ASSESSMENT - PROBABLY BENIGN. DIGITAL AILIN RIGHT MAMMOGRAM: 8/24/2020  No radiographic evidence of malignancy.  The asymmetric density seen on the    recent screening mammogram is not confirmed on the additional views. Subsequent ultrasound study only showed a couple of tiny cystic lesions with    no suspicious mass seen.  Unless otherwise clinically indicated, follow-up    mammography and ultrasound of the right breast in 6 months is suggested.         BIRADS:    BIRADS - CATEGORY 3         Findings are probably benign. A short interval follow-up is recommended in 6    months.         OVERALL ASSESSMENT - PROBABLY BENIGN. BILATERAL SCREENING MAMMOGRAM:  8/04/2020.  1. Approximately 1 cm asymmetry seen within the medial aspect of the right    breast 4.7 cm from the nipple.  The asymmetry is only seen on the CC view. Dedicated diagnostic mammogram of the right breast is recommended. 2. Stable mammogram of the left breast.  Annual screening mammography left    breast is recommended.         BIRADS:    BIRADS - CATEGORY 0         Additional imaging is recommended at this time.         OVERALL ASSESSMENT - INCOMPLETE:NEED ADDITIONAL IMAGING EVALUATION.                 ASSESSMENT/PLAN :  Polycythemia with borderline neutrophilic leukocytosis.     She has no clinical suggestion of reactive compensatory secondary polycythemia with absence of prior history of smoking COPD or sleep apnea     Rule out primary myeloproliferative disorder such as P vera.   Rule out CML doubt     To check CBC, blood smear review, baseline EPO level, qualitative JAK2 mutation as well as BCR ABL by FISH. We will follow    Her EPO level was low at 1. Qualitative Jose Guadalupe 2 mutation was positive  BCR-ABL by FISH was negative    She has polycythemia vera  Hemoglobin was elevated at 17.9 on 3/9/2020    . Hemoglobin was elevated at 17.7 on 3/30/2020 and she underwent a  therapeutic phlebotomy and 250 cc of blood was removed under medical supervision. She was initiated on hydroxyurea 500 mg daily. Hemoglobin was elevated at 18.0  on 4/24/2020 and she will undergo a  therapeutic phlebotomy and 250 cc of blood will be  removed under medical supervision. Her Hydrea was increased to 500 mg twice daily. She was given PRN Zofran for nausea    Hgb was 16 on 5/27/2020 and she   She is to undergo phlebotomy with a target hemoglobin of 15  She will be maintained on Hydrea 500 mg twice daily. Hgb was 11.7  on  7/28/2020  Hydrea dose was decreased to 500 mg daily    Hgb was 12.7  on  8/25/2020      Juliette Pal M.D., F.A.C.P.   Electronically signed 8/25/2020 at 1:56 PM

## 2020-08-26 ENCOUNTER — HOSPITAL ENCOUNTER (OUTPATIENT)
Dept: INFUSION THERAPY | Age: 85
End: 2020-08-26
Payer: MEDICARE

## 2020-08-26 ENCOUNTER — OFFICE VISIT (OUTPATIENT)
Dept: ONCOLOGY | Age: 85
End: 2020-08-26

## 2020-09-22 ENCOUNTER — HOSPITAL ENCOUNTER (OUTPATIENT)
Dept: INFUSION THERAPY | Age: 85
Discharge: HOME | End: 2020-09-22
Payer: MEDICARE

## 2020-09-22 DIAGNOSIS — D45 POLYCYTHEMIA VERA (HCC): ICD-10-CM

## 2020-09-22 LAB
ALBUMIN SERPL-MCNC: 3.7 G/DL (ref 3.5–5.2)
ALP BLD-CCNC: 59 U/L (ref 35–104)
ALT SERPL-CCNC: 14 U/L (ref 0–32)
ANION GAP SERPL CALCULATED.3IONS-SCNC: 10 MMOL/L (ref 7–16)
AST SERPL-CCNC: 18 U/L (ref 0–31)
BASOPHILS ABSOLUTE: 0.08 E9/L (ref 0–0.2)
BASOPHILS RELATIVE PERCENT: 1.2 % (ref 0–2)
BILIRUB SERPL-MCNC: 0.4 MG/DL (ref 0–1.2)
BUN BLDV-MCNC: 16 MG/DL (ref 8–23)
CALCIUM SERPL-MCNC: 9.6 MG/DL (ref 8.6–10.2)
CHLORIDE BLD-SCNC: 103 MMOL/L (ref 98–107)
CO2: 22 MMOL/L (ref 22–29)
CREAT SERPL-MCNC: 1.1 MG/DL (ref 0.5–1)
EOSINOPHILS ABSOLUTE: 0.28 E9/L (ref 0.05–0.5)
EOSINOPHILS RELATIVE PERCENT: 4.3 % (ref 0–6)
GFR AFRICAN AMERICAN: 57
GFR NON-AFRICAN AMERICAN: 47 ML/MIN/1.73
GLUCOSE BLD-MCNC: 113 MG/DL (ref 74–99)
HCT VFR BLD CALC: 39.6 % (ref 34–48)
HEMOGLOBIN: 14.3 G/DL (ref 11.5–15.5)
IMMATURE GRANULOCYTES #: 0.02 E9/L
IMMATURE GRANULOCYTES %: 0.3 % (ref 0–5)
LYMPHOCYTES ABSOLUTE: 1.08 E9/L (ref 1.5–4)
LYMPHOCYTES RELATIVE PERCENT: 16.4 % (ref 20–42)
MCH RBC QN AUTO: 42.3 PG (ref 26–35)
MCHC RBC AUTO-ENTMCNC: 36.1 % (ref 32–34.5)
MCV RBC AUTO: 117.2 FL (ref 80–99.9)
MONOCYTES ABSOLUTE: 0.23 E9/L (ref 0.1–0.95)
MONOCYTES RELATIVE PERCENT: 3.5 % (ref 2–12)
NEUTROPHILS ABSOLUTE: 4.88 E9/L (ref 1.8–7.3)
NEUTROPHILS RELATIVE PERCENT: 74.3 % (ref 43–80)
OVALOCYTES: ABNORMAL
PDW BLD-RTO: 11.7 FL (ref 11.5–15)
PLATELET # BLD: 211 E9/L (ref 130–450)
PMV BLD AUTO: 10.2 FL (ref 7–12)
POIKILOCYTES: ABNORMAL
POLYCHROMASIA: ABNORMAL
POTASSIUM SERPL-SCNC: 4.5 MMOL/L (ref 3.5–5)
RBC # BLD: 3.38 E12/L (ref 3.5–5.5)
SODIUM BLD-SCNC: 135 MMOL/L (ref 132–146)
TOTAL PROTEIN: 6.1 G/DL (ref 6.4–8.3)
WBC # BLD: 6.6 E9/L (ref 4.5–11.5)

## 2020-09-22 PROCEDURE — 80053 COMPREHEN METABOLIC PANEL: CPT

## 2020-09-22 PROCEDURE — 85025 COMPLETE CBC W/AUTO DIFF WBC: CPT

## 2020-09-22 PROCEDURE — 36415 COLL VENOUS BLD VENIPUNCTURE: CPT

## 2020-09-23 ENCOUNTER — HOSPITAL ENCOUNTER (OUTPATIENT)
Dept: INFUSION THERAPY | Age: 85
Discharge: HOME | End: 2020-09-23
Payer: MEDICARE

## 2020-09-23 ENCOUNTER — OFFICE VISIT (OUTPATIENT)
Dept: ONCOLOGY | Age: 85
End: 2020-09-23
Payer: MEDICARE

## 2020-09-23 VITALS
SYSTOLIC BLOOD PRESSURE: 144 MMHG | OXYGEN SATURATION: 96 % | DIASTOLIC BLOOD PRESSURE: 69 MMHG | WEIGHT: 140 LBS | TEMPERATURE: 96.1 F | BODY MASS INDEX: 24.8 KG/M2 | HEART RATE: 69 BPM

## 2020-09-23 PROCEDURE — 99212 OFFICE O/P EST SF 10 MIN: CPT | Performed by: INTERNAL MEDICINE

## 2020-09-23 RX ORDER — HYDROXYUREA 500 MG/1
500 CAPSULE ORAL DAILY
Qty: 30 CAPSULE | Refills: 1 | Status: SHIPPED | OUTPATIENT
Start: 2020-09-23 | End: 2020-10-23

## 2020-09-23 NOTE — PROGRESS NOTES
[address] [facility]. [facility] [place]        Pt Name: Verónica Rae  YOB: 1935  Date of evaluation: 9/23/2020  Primary Care Physician: Marisela Powell DO  Reason for evaluation:   Chief Complaint   Patient presents with    Follow-up     Polycythemia vera        Subjective: Here for  follow up. Cancelled appointment on  8/26/2020 No new complaints. On Hydrea 500 mg daily. Tolerating Hydrea well. OBJECTIVE:  VITALS:  weight is 140 lb (63.5 kg). Her temporal temperature is 96.1 °F (35.6 °C). Her blood pressure is 144/69 (abnormal) and her pulse is 69. Her oxygen saturation is 96%. Physical Exam:  Performance Status: 0  Well developed, well nourished female  General: AAO to person, place, time, in no acute distress. Head and neck: PERRLA, EOMI . Sclera non icteric. Oropharynx: Clear. Neck: no JVD,  no adenopathy. Heart: Regular rate and regular rhythm. No murmur. Lungs: Clear to auscultation. Abdomen: Soft, non-tender;no masses, no organomegaly. Extremities: No edema,no cyanosis, no clubbing. Neurologic:Cranial nerves grossly intact. No focal motor or sensory deficits. Skin:  No rash. Medications  Prior to Admission medications    Medication Sig Start Date End Date Taking? Authorizing Provider   ondansetron (ZOFRAN) 4 MG tablet Take 1 tablet by mouth every 12 hours as needed for Nausea or Vomiting 4/27/20  Yes Carole Phillips MD   Multiple Vitamins-Minerals (THERAPEUTIC MULTIVITAMIN-MINERALS) tablet Take 1 tablet by mouth daily   Yes Historical Provider, MD   atorvastatin (LIPITOR) 40 MG tablet Take 40 mg by mouth daily   Yes Historical Provider, MD   aspirin 81 MG EC tablet Take 81 mg by mouth daily   Yes Historical Provider, MD   nitrofurantoin (MACRODANTIN) 100 MG capsule Take 100 mg by mouth nightly    Yes Historical Provider, MD   traMADol (ULTRAM) 50 MG tablet Take 50 mg by mouth every 6 hours as needed for Pain.    Yes Historical Provider, MD calcium carbonate (OSCAL) 500 MG TABS tablet Take 500 mg by mouth daily Indications: plus 10 mcg D3 Patient is taking 3 tablets daily   Yes Historical Provider, MD   lisinopril (PRINIVIL;ZESTRIL) 10 MG tablet Take 10 mg by mouth daily   Yes Historical Provider, MD   escitalopram (LEXAPRO) 20 MG tablet Take 20 mg by mouth daily   Yes Historical Provider, MD   pantoprazole (PROTONIX) 40 MG tablet Take 40 mg by mouth daily   Yes Historical Provider, MD   colestipol (COLESTID) 1 g tablet Take 2 g by mouth 2 times daily    Yes Historical Provider, MD    Scheduled Meds:  Continuous Infusions:  PRN Meds:.        Recent Laboratory Data-     Lab Results   Component Value Date    WBC 6.6 09/22/2020    HGB 14.3 09/22/2020    HCT 39.6 09/22/2020    .2 (H) 09/22/2020     09/22/2020    LYMPHOPCT 16.4 (L) 09/22/2020    RBC 3.38 (L) 09/22/2020    MCH 42.3 (H) 09/22/2020    MCHC 36.1 (H) 09/22/2020    RDW 11.7 09/22/2020    NEUTOPHILPCT 74.3 09/22/2020    MONOPCT 3.5 09/22/2020    BASOPCT 1.2 09/22/2020    NEUTROABS 4.88 09/22/2020    LYMPHSABS 1.08 (L) 09/22/2020    MONOSABS 0.23 09/22/2020    EOSABS 0.28 09/22/2020    BASOSABS 0.08 09/22/2020       Lab Results   Component Value Date     09/22/2020    K 4.5 09/22/2020     09/22/2020    CO2 22 09/22/2020    BUN 16 09/22/2020    CREATININE 1.1 (H) 09/22/2020    GLUCOSE 113 (H) 09/22/2020    CALCIUM 9.6 09/22/2020    PROT 6.1 (L) 09/22/2020    LABALBU 3.7 09/22/2020    BILITOT 0.4 09/22/2020    ALKPHOS 59 09/22/2020    AST 18 09/22/2020    ALT 14 09/22/2020    LABGLOM 47 09/22/2020    GFRAA 57 09/22/2020         QUALITATIVE JAK2 MUTATION:  POSITIVE.      BCR/ABL1 Interphase FISH:  NEGATIVE          Radiology-  US RIGHT BREAST:  8/24/2020  1. 2 tiny cystic lesions including simple cyst at the 3 o'clock position and    mildly complex cyst at the 6 o'clock patient vision.  Both of these measure 4    mm.     2. Otherwise, unremarkable right breast ultrasound and diagnostic mammogram.    Please note that a negative report should not delay biopsy if there is a    clinically suspicious palpable abnormality.  Unless otherwise clinically    indicated, follow-up mammography and ultrasound of the right breast in 6    months is suggested.         BIRADS:    BIRADS - CATEGORY 3         Findings are probably benign. A short interval follow-up is recommended in 6    months.         OVERALL ASSESSMENT - PROBABLY BENIGN. DIGITAL AILIN RIGHT MAMMOGRAM: 8/24/2020  No radiographic evidence of malignancy.  The asymmetric density seen on the    recent screening mammogram is not confirmed on the additional views. Subsequent ultrasound study only showed a couple of tiny cystic lesions with    no suspicious mass seen.  Unless otherwise clinically indicated, follow-up    mammography and ultrasound of the right breast in 6 months is suggested.         BIRADS:    BIRADS - CATEGORY 3         Findings are probably benign. A short interval follow-up is recommended in 6    months.         OVERALL ASSESSMENT - PROBABLY BENIGN. BILATERAL SCREENING MAMMOGRAM:  8/04/2020.  1. Approximately 1 cm asymmetry seen within the medial aspect of the right    breast 4.7 cm from the nipple.  The asymmetry is only seen on the CC view. Dedicated diagnostic mammogram of the right breast is recommended. 2. Stable mammogram of the left breast.  Annual screening mammography left    breast is recommended.         BIRADS:    BIRADS - CATEGORY 0         Additional imaging is recommended at this time.         OVERALL ASSESSMENT - INCOMPLETE:NEED ADDITIONAL IMAGING EVALUATION.                 ASSESSMENT/PLAN :  Polycythemia with borderline neutrophilic leukocytosis.     She has no clinical suggestion of reactive compensatory secondary polycythemia with absence of prior history of smoking COPD or sleep apnea     Rule out primary myeloproliferative disorder such as P vera.   Rule out CML doubt     To check CBC, blood smear review, baseline EPO level, qualitative JAK2 mutation as well as BCR ABL by FISH. We will follow    Her EPO level was low at 1. Qualitative Jose Guadalupe 2 mutation was positive  BCR-ABL by FISH was negative    She has polycythemia vera  Hemoglobin was elevated at 17.9 on 3/9/2020    . Hemoglobin was elevated at 17.7 on 3/30/2020 and she underwent a  therapeutic phlebotomy and 250 cc of blood was removed under medical supervision. She was initiated on hydroxyurea 500 mg daily. Hemoglobin was elevated at 18.0  on 4/24/2020 and she will undergo a  therapeutic phlebotomy and 250 cc of blood will be  removed under medical supervision. Her Hydrea was increased to 500 mg twice daily. She was given PRN Zofran for nausea    Hgb was 16 on 5/27/2020 and she   She is to undergo phlebotomy with a target hemoglobin of 15  She will be maintained on Hydrea 500 mg twice daily. Hgb was 11.7  on  7/28/2020  Hydrea dose was decreased to 500 mg daily    Hgb was 12.7  on  8/25/2020  Hgb was 14.3  on  9/22/2020. No phlebotomy warranted to continue Hydrea 500 mg daily    Darnell Pal M.D., F.A.C.P.   Electronically signed 9/23/2020 at 1:35 PM

## 2020-10-27 ENCOUNTER — HOSPITAL ENCOUNTER (OUTPATIENT)
Dept: INFUSION THERAPY | Age: 85
Discharge: HOME | End: 2020-10-27
Payer: MEDICARE

## 2020-10-27 DIAGNOSIS — D45 POLYCYTHEMIA VERA (HCC): ICD-10-CM

## 2020-10-27 LAB
ALBUMIN SERPL-MCNC: 3.8 G/DL (ref 3.5–5.2)
ALP BLD-CCNC: 71 U/L (ref 35–104)
ALT SERPL-CCNC: 12 U/L (ref 0–32)
ANION GAP SERPL CALCULATED.3IONS-SCNC: 11 MMOL/L (ref 7–16)
AST SERPL-CCNC: 16 U/L (ref 0–31)
BASOPHILS ABSOLUTE: 0.18 E9/L (ref 0–0.2)
BASOPHILS RELATIVE PERCENT: 2.6 % (ref 0–2)
BILIRUB SERPL-MCNC: 0.6 MG/DL (ref 0–1.2)
BUN BLDV-MCNC: 15 MG/DL (ref 8–23)
CALCIUM SERPL-MCNC: 9.6 MG/DL (ref 8.6–10.2)
CHLORIDE BLD-SCNC: 102 MMOL/L (ref 98–107)
CO2: 23 MMOL/L (ref 22–29)
CREAT SERPL-MCNC: 1.1 MG/DL (ref 0.5–1)
EOSINOPHILS ABSOLUTE: 0.06 E9/L (ref 0.05–0.5)
EOSINOPHILS RELATIVE PERCENT: 0.9 % (ref 0–6)
GFR AFRICAN AMERICAN: 57
GFR NON-AFRICAN AMERICAN: 47 ML/MIN/1.73
GLUCOSE BLD-MCNC: 208 MG/DL (ref 74–99)
HCT VFR BLD CALC: 45.5 % (ref 34–48)
HEMOGLOBIN: 15.9 G/DL (ref 11.5–15.5)
LYMPHOCYTES ABSOLUTE: 0.9 E9/L (ref 1.5–4)
LYMPHOCYTES RELATIVE PERCENT: 13.2 % (ref 20–42)
MCH RBC QN AUTO: 38.9 PG (ref 26–35)
MCHC RBC AUTO-ENTMCNC: 34.9 % (ref 32–34.5)
MCV RBC AUTO: 111.2 FL (ref 80–99.9)
MONOCYTES ABSOLUTE: 0.41 E9/L (ref 0.1–0.95)
MONOCYTES RELATIVE PERCENT: 6.1 % (ref 2–12)
NEUTROPHILS ABSOLUTE: 5.31 E9/L (ref 1.8–7.3)
NEUTROPHILS RELATIVE PERCENT: 77.2 % (ref 43–80)
OVALOCYTES: ABNORMAL
PDW BLD-RTO: 12 FL (ref 11.5–15)
PLATELET # BLD: 205 E9/L (ref 130–450)
PMV BLD AUTO: 10.9 FL (ref 7–12)
POIKILOCYTES: ABNORMAL
POLYCHROMASIA: ABNORMAL
POTASSIUM SERPL-SCNC: 4.3 MMOL/L (ref 3.5–5)
RBC # BLD: 4.09 E12/L (ref 3.5–5.5)
SODIUM BLD-SCNC: 136 MMOL/L (ref 132–146)
TOTAL PROTEIN: 6.3 G/DL (ref 6.4–8.3)
WBC # BLD: 6.9 E9/L (ref 4.5–11.5)

## 2020-10-27 PROCEDURE — 85025 COMPLETE CBC W/AUTO DIFF WBC: CPT

## 2020-10-27 PROCEDURE — 80053 COMPREHEN METABOLIC PANEL: CPT

## 2020-10-27 PROCEDURE — 36415 COLL VENOUS BLD VENIPUNCTURE: CPT

## 2020-11-02 ENCOUNTER — HOSPITAL ENCOUNTER (OUTPATIENT)
Dept: INFUSION THERAPY | Age: 85
End: 2020-11-02
Payer: MEDICARE

## 2020-11-09 ENCOUNTER — OFFICE VISIT (OUTPATIENT)
Dept: ONCOLOGY | Age: 85
End: 2020-11-09
Payer: MEDICARE

## 2020-11-09 ENCOUNTER — HOSPITAL ENCOUNTER (OUTPATIENT)
Dept: INFUSION THERAPY | Age: 85
Discharge: HOME | End: 2020-11-09
Payer: MEDICARE

## 2020-11-09 VITALS
OXYGEN SATURATION: 96 % | TEMPERATURE: 97.9 F | HEART RATE: 84 BPM | BODY MASS INDEX: 25.23 KG/M2 | WEIGHT: 142.4 LBS | DIASTOLIC BLOOD PRESSURE: 81 MMHG | SYSTOLIC BLOOD PRESSURE: 156 MMHG | HEIGHT: 63 IN

## 2020-11-09 VITALS
TEMPERATURE: 98.9 F | DIASTOLIC BLOOD PRESSURE: 68 MMHG | HEART RATE: 77 BPM | RESPIRATION RATE: 18 BRPM | SYSTOLIC BLOOD PRESSURE: 152 MMHG

## 2020-11-09 DIAGNOSIS — D45 POLYCYTHEMIA VERA (HCC): Primary | ICD-10-CM

## 2020-11-09 PROCEDURE — 99195 PHLEBOTOMY: CPT

## 2020-11-09 RX ORDER — 0.9 % SODIUM CHLORIDE 0.9 %
250 INTRAVENOUS SOLUTION INTRAVENOUS ONCE
Status: CANCELLED | OUTPATIENT
Start: 2020-11-09

## 2020-11-09 RX ORDER — HYDROXYUREA 500 MG/1
500 CAPSULE ORAL 2 TIMES DAILY
Qty: 60 CAPSULE | Refills: 1 | Status: SHIPPED
Start: 2020-11-09 | End: 2021-01-11 | Stop reason: SDUPTHER

## 2020-11-09 RX ORDER — 0.9 % SODIUM CHLORIDE 0.9 %
500 INTRAVENOUS SOLUTION INTRAVENOUS ONCE
Status: CANCELLED | OUTPATIENT
Start: 2020-11-09

## 2020-11-09 NOTE — PROGRESS NOTES
(OSCAL) 500 MG TABS tablet Take 500 mg by mouth daily Indications: plus 10 mcg D3 Patient is taking 3 tablets daily   Yes Historical Provider, MD   lisinopril (PRINIVIL;ZESTRIL) 10 MG tablet Take 10 mg by mouth daily   Yes Historical Provider, MD   escitalopram (LEXAPRO) 20 MG tablet Take 20 mg by mouth daily   Yes Historical Provider, MD   pantoprazole (PROTONIX) 40 MG tablet Take 40 mg by mouth daily   Yes Historical Provider, MD   colestipol (COLESTID) 1 g tablet Take 2 g by mouth 2 times daily    Yes Historical Provider, MD    Scheduled Meds:  Continuous Infusions:  PRN Meds:.        Recent Laboratory Data-     Lab Results   Component Value Date    WBC 6.9 10/27/2020    HGB 15.9 (H) 10/27/2020    HCT 45.5 10/27/2020    .2 (H) 10/27/2020     10/27/2020    LYMPHOPCT 13.2 (L) 10/27/2020    RBC 4.09 10/27/2020    MCH 38.9 (H) 10/27/2020    MCHC 34.9 (H) 10/27/2020    RDW 12.0 10/27/2020    NEUTOPHILPCT 77.2 10/27/2020    MONOPCT 6.1 10/27/2020    BASOPCT 2.6 (H) 10/27/2020    NEUTROABS 5.31 10/27/2020    LYMPHSABS 0.90 (L) 10/27/2020    MONOSABS 0.41 10/27/2020    EOSABS 0.06 10/27/2020    BASOSABS 0.18 10/27/2020       Lab Results   Component Value Date     10/27/2020    K 4.3 10/27/2020     10/27/2020    CO2 23 10/27/2020    BUN 15 10/27/2020    CREATININE 1.1 (H) 10/27/2020    GLUCOSE 208 (H) 10/27/2020    CALCIUM 9.6 10/27/2020    PROT 6.3 (L) 10/27/2020    LABALBU 3.8 10/27/2020    BILITOT 0.6 10/27/2020    ALKPHOS 71 10/27/2020    AST 16 10/27/2020    ALT 12 10/27/2020    LABGLOM 47 10/27/2020    GFRAA 57 10/27/2020         QUALITATIVE JAK2 MUTATION:  POSITIVE.      BCR/ABL1 Interphase FISH:  NEGATIVE          Radiology-  US RIGHT BREAST:  8/24/2020  1. 2 tiny cystic lesions including simple cyst at the 3 o'clock position and    mildly complex cyst at the 6 o'clock patient vision.  Both of these measure 4    mm.     2. Otherwise, unremarkable right breast ultrasound and diagnostic mammogram.    Please note that a negative report should not delay biopsy if there is a    clinically suspicious palpable abnormality.  Unless otherwise clinically    indicated, follow-up mammography and ultrasound of the right breast in 6    months is suggested.         BIRADS:    BIRADS - CATEGORY 3         Findings are probably benign. A short interval follow-up is recommended in 6    months.         OVERALL ASSESSMENT - PROBABLY BENIGN. DIGITAL AILIN RIGHT MAMMOGRAM: 8/24/2020  No radiographic evidence of malignancy.  The asymmetric density seen on the    recent screening mammogram is not confirmed on the additional views. Subsequent ultrasound study only showed a couple of tiny cystic lesions with    no suspicious mass seen.  Unless otherwise clinically indicated, follow-up    mammography and ultrasound of the right breast in 6 months is suggested.         BIRADS:    BIRADS - CATEGORY 3         Findings are probably benign. A short interval follow-up is recommended in 6    months.         OVERALL ASSESSMENT - PROBABLY BENIGN. BILATERAL SCREENING MAMMOGRAM:  8/04/2020.  1. Approximately 1 cm asymmetry seen within the medial aspect of the right    breast 4.7 cm from the nipple.  The asymmetry is only seen on the CC view. Dedicated diagnostic mammogram of the right breast is recommended. 2. Stable mammogram of the left breast.  Annual screening mammography left    breast is recommended.         BIRADS:    BIRADS - CATEGORY 0         Additional imaging is recommended at this time.         OVERALL ASSESSMENT - INCOMPLETE:NEED ADDITIONAL IMAGING EVALUATION.                 ASSESSMENT/PLAN :  Polycythemia with borderline neutrophilic leukocytosis.     She has no clinical suggestion of reactive compensatory secondary polycythemia with absence of prior history of smoking COPD or sleep apnea     Rule out primary myeloproliferative disorder such as P vera.   Rule out CML doubt     To check CBC, blood smear review, baseline EPO level, qualitative JAK2 mutation as well as BCR ABL by FISH. We will follow    Her EPO level was low at 1. Qualitative Jose Guadalupe 2 mutation was positive  BCR-ABL by FISH was negative    She has polycythemia vera  Hemoglobin was elevated at 17.9 on 3/9/2020    . Hemoglobin was elevated at 17.7 on 3/30/2020 and she underwent a  therapeutic phlebotomy and 250 cc of blood was removed under medical supervision. She was initiated on hydroxyurea 500 mg daily. Hemoglobin was elevated at 18.0  on 4/24/2020 and she underwent a  therapeutic phlebotomy and 250 cc of blood was  removed under medical supervision. Her Hydrea was increased to 500 mg twice daily. She was given PRN Zofran for nausea    Hgb was 16 on 5/27/2020 and she underwent phlebotomy with a target hemoglobin of 15  She will be maintained on Hydrea 500 mg twice daily. Hgb was 11.7  on  7/28/2020  Hydrea dose was decreased to 500 mg daily    Hgb was 12.7  on  8/25/2020  Hgb was 14.3  on  9/22/2020. No phlebotomy warranted to continue Hydrea 500 mg daily      PLAN:  Hgb was 15.9; Hct was 45.5  on 10/27/2020  She is to undergo a  therapeutic phlebotomy and 250 cc of blood will be  removed under medical supervision with a target hemoglobin of 15. Her Hydrea dose will be increased back to 500 mg twice daily        Joshua Mahoney M.D., F.A.C.P.   Electronically signed 11/9/2020 at 12:03 PM

## 2020-11-09 NOTE — PROGRESS NOTES
IV site initiated at right wrist with 20g# intima. Therapeutic phlebotomy performed for 250 cc blood. Tolerated procedure and 15 minute observation without adverse reaction. Took fluids freely throughout observation. Verbal information provided to patient / family on procedure and post procedure care. Encouraged to call clinic during clinic hours and physician after clinic hours if questions or concerns arise.

## 2020-12-11 DIAGNOSIS — D45 POLYCYTHEMIA VERA (HCC): ICD-10-CM

## 2020-12-11 LAB
ANISOCYTOSIS: ABNORMAL
BASOPHILS ABSOLUTE: 0 E9/L (ref 0–0.2)
BASOPHILS RELATIVE PERCENT: 1.1 % (ref 0–2)
EOSINOPHILS ABSOLUTE: 0.03 E9/L (ref 0.05–0.5)
EOSINOPHILS RELATIVE PERCENT: 0.9 % (ref 0–6)
HCT VFR BLD CALC: 40.8 % (ref 34–48)
HEMOGLOBIN: 13.6 G/DL (ref 11.5–15.5)
LYMPHOCYTES ABSOLUTE: 0.77 E9/L (ref 1.5–4)
LYMPHOCYTES RELATIVE PERCENT: 22.2 % (ref 20–42)
MCH RBC QN AUTO: 38.3 PG (ref 26–35)
MCHC RBC AUTO-ENTMCNC: 33.3 % (ref 32–34.5)
MCV RBC AUTO: 114.9 FL (ref 80–99.9)
MONOCYTES ABSOLUTE: 0.18 E9/L (ref 0.1–0.95)
MONOCYTES RELATIVE PERCENT: 5.1 % (ref 2–12)
NEUTROPHILS ABSOLUTE: 2.52 E9/L (ref 1.8–7.3)
NEUTROPHILS RELATIVE PERCENT: 71.8 % (ref 43–80)
OVALOCYTES: ABNORMAL
PDW BLD-RTO: 16 FL (ref 11.5–15)
PLATELET # BLD: 146 E9/L (ref 130–450)
PMV BLD AUTO: 10.2 FL (ref 7–12)
POIKILOCYTES: ABNORMAL
POLYCHROMASIA: ABNORMAL
RBC # BLD: 3.55 E12/L (ref 3.5–5.5)
TEAR DROP CELLS: ABNORMAL
WBC # BLD: 3.5 E9/L (ref 4.5–11.5)

## 2020-12-14 ENCOUNTER — OFFICE VISIT (OUTPATIENT)
Dept: ONCOLOGY | Age: 85
End: 2020-12-14

## 2020-12-14 NOTE — PROGRESS NOTES
ONCOLOGY  NP VISIT         12/14/2020      NAME:  Cathryn Burrell    YOB: 1935      Cancelled appointment

## 2021-01-08 DIAGNOSIS — D45 POLYCYTHEMIA VERA (HCC): ICD-10-CM

## 2021-01-08 LAB
ALBUMIN SERPL-MCNC: 4 G/DL (ref 3.5–5.2)
ALP BLD-CCNC: 61 U/L (ref 35–104)
ALT SERPL-CCNC: 12 U/L (ref 0–32)
ANION GAP SERPL CALCULATED.3IONS-SCNC: 10 MMOL/L (ref 7–16)
ANISOCYTOSIS: ABNORMAL
AST SERPL-CCNC: 15 U/L (ref 0–31)
BASOPHILS ABSOLUTE: 0.05 E9/L (ref 0–0.2)
BASOPHILS RELATIVE PERCENT: 1.3 % (ref 0–2)
BILIRUB SERPL-MCNC: 0.6 MG/DL (ref 0–1.2)
BUN BLDV-MCNC: 19 MG/DL (ref 8–23)
CALCIUM SERPL-MCNC: 9.9 MG/DL (ref 8.6–10.2)
CHLORIDE BLD-SCNC: 101 MMOL/L (ref 98–107)
CO2: 25 MMOL/L (ref 22–29)
CREAT SERPL-MCNC: 1.1 MG/DL (ref 0.5–1)
EOSINOPHILS ABSOLUTE: 0.03 E9/L (ref 0.05–0.5)
EOSINOPHILS RELATIVE PERCENT: 0.8 % (ref 0–6)
GFR AFRICAN AMERICAN: 57
GFR NON-AFRICAN AMERICAN: 47 ML/MIN/1.73
GLUCOSE BLD-MCNC: 113 MG/DL (ref 74–99)
HCT VFR BLD CALC: 37 % (ref 34–48)
HEMOGLOBIN: 12.8 G/DL (ref 11.5–15.5)
IMMATURE GRANULOCYTES #: 0.03 E9/L
IMMATURE GRANULOCYTES %: 0.8 % (ref 0–5)
LYMPHOCYTES ABSOLUTE: 0.92 E9/L (ref 1.5–4)
LYMPHOCYTES RELATIVE PERCENT: 24.3 % (ref 20–42)
MCH RBC QN AUTO: 40.9 PG (ref 26–35)
MCHC RBC AUTO-ENTMCNC: 34.6 % (ref 32–34.5)
MCV RBC AUTO: 118.2 FL (ref 80–99.9)
MONOCYTES ABSOLUTE: 0.15 E9/L (ref 0.1–0.95)
MONOCYTES RELATIVE PERCENT: 4 % (ref 2–12)
NEUTROPHILS ABSOLUTE: 2.6 E9/L (ref 1.8–7.3)
NEUTROPHILS RELATIVE PERCENT: 68.8 % (ref 43–80)
PDW BLD-RTO: 18 FL (ref 11.5–15)
PLATELET # BLD: 164 E9/L (ref 130–450)
PMV BLD AUTO: 10.4 FL (ref 7–12)
POTASSIUM SERPL-SCNC: 4.5 MMOL/L (ref 3.5–5)
RBC # BLD: 3.13 E12/L (ref 3.5–5.5)
SODIUM BLD-SCNC: 136 MMOL/L (ref 132–146)
TOTAL PROTEIN: 6.4 G/DL (ref 6.4–8.3)
WBC # BLD: 3.8 E9/L (ref 4.5–11.5)

## 2021-01-08 NOTE — PROGRESS NOTES
not delay biopsy if there is a    clinically suspicious palpable abnormality.  Unless otherwise clinically    indicated, follow-up mammography and ultrasound of the right breast in 6    months is suggested.         BIRADS:    BIRADS - CATEGORY 3         Findings are probably benign. A short interval follow-up is recommended in 6    months.         OVERALL ASSESSMENT - PROBABLY BENIGN. DIGITAL AILIN RIGHT MAMMOGRAM: 8/24/2020  No radiographic evidence of malignancy.  The asymmetric density seen on the    recent screening mammogram is not confirmed on the additional views. Subsequent ultrasound study only showed a couple of tiny cystic lesions with    no suspicious mass seen.  Unless otherwise clinically indicated, follow-up    mammography and ultrasound of the right breast in 6 months is suggested.         BIRADS:    BIRADS - CATEGORY 3         Findings are probably benign. A short interval follow-up is recommended in 6    months.         OVERALL ASSESSMENT - PROBABLY BENIGN. BILATERAL SCREENING MAMMOGRAM:  8/04/2020.  1. Approximately 1 cm asymmetry seen within the medial aspect of the right    breast 4.7 cm from the nipple.  The asymmetry is only seen on the CC view. Dedicated diagnostic mammogram of the right breast is recommended. 2. Stable mammogram of the left breast.  Annual screening mammography left    breast is recommended.         BIRADS:    BIRADS - CATEGORY 0         Additional imaging is recommended at this time.         OVERALL ASSESSMENT - INCOMPLETE:NEED ADDITIONAL IMAGING EVALUATION.                 ASSESSMENT/PLAN :  Polycythemia with borderline neutrophilic leukocytosis.     She has no clinical suggestion of reactive compensatory secondary polycythemia with absence of prior history of smoking COPD or sleep apnea     Rule out primary myeloproliferative disorder such as P vera.   Rule out CML doubt     To check CBC, blood smear review, baseline EPO level, qualitative JAK2

## 2021-01-11 ENCOUNTER — HOSPITAL ENCOUNTER (OUTPATIENT)
Dept: INFUSION THERAPY | Age: 86
End: 2021-01-11
Payer: MEDICARE

## 2021-01-11 ENCOUNTER — OFFICE VISIT (OUTPATIENT)
Dept: ONCOLOGY | Age: 86
End: 2021-01-11

## 2021-01-11 DIAGNOSIS — D45 POLYCYTHEMIA VERA (HCC): Primary | ICD-10-CM

## 2021-01-11 DIAGNOSIS — D45 POLYCYTHEMIA VERA (HCC): ICD-10-CM

## 2021-01-11 RX ORDER — HYDROXYUREA 500 MG/1
500 CAPSULE ORAL 2 TIMES DAILY
Qty: 60 CAPSULE | Refills: 1 | Status: SHIPPED
Start: 2021-01-11 | End: 2021-03-22 | Stop reason: SDUPTHER

## 2021-02-04 LAB
ALBUMIN SERPL-MCNC: 4 G/DL (ref 3.5–5.2)
ALP BLD-CCNC: 62 U/L (ref 35–104)
ALT SERPL-CCNC: 14 U/L (ref 0–32)
ANION GAP SERPL CALCULATED.3IONS-SCNC: 10 MMOL/L (ref 7–16)
ANISOCYTOSIS: ABNORMAL
AST SERPL-CCNC: 18 U/L (ref 0–31)
BASOPHILS ABSOLUTE: 0.03 E9/L (ref 0–0.2)
BASOPHILS RELATIVE PERCENT: 0.9 % (ref 0–2)
BILIRUB SERPL-MCNC: 0.6 MG/DL (ref 0–1.2)
BUN BLDV-MCNC: 19 MG/DL (ref 8–23)
CALCIUM SERPL-MCNC: 9.9 MG/DL (ref 8.6–10.2)
CHLORIDE BLD-SCNC: 99 MMOL/L (ref 98–107)
CO2: 27 MMOL/L (ref 22–29)
CREAT SERPL-MCNC: 1.2 MG/DL (ref 0.5–1)
EOSINOPHILS ABSOLUTE: 0.04 E9/L (ref 0.05–0.5)
EOSINOPHILS RELATIVE PERCENT: 1.2 % (ref 0–6)
GFR AFRICAN AMERICAN: 52
GFR NON-AFRICAN AMERICAN: 43 ML/MIN/1.73
GLUCOSE BLD-MCNC: 101 MG/DL (ref 74–99)
HCT VFR BLD CALC: 36.2 % (ref 34–48)
HEMOGLOBIN: 12.5 G/DL (ref 11.5–15.5)
HOWELL-JOLLY BODIES: ABNORMAL
IMMATURE GRANULOCYTES #: 0.02 E9/L
IMMATURE GRANULOCYTES %: 0.6 % (ref 0–5)
LYMPHOCYTES ABSOLUTE: 0.96 E9/L (ref 1.5–4)
LYMPHOCYTES RELATIVE PERCENT: 27.7 % (ref 20–42)
MCH RBC QN AUTO: 42.7 PG (ref 26–35)
MCHC RBC AUTO-ENTMCNC: 34.5 % (ref 32–34.5)
MCV RBC AUTO: 123.5 FL (ref 80–99.9)
MONOCYTES ABSOLUTE: 0.15 E9/L (ref 0.1–0.95)
MONOCYTES RELATIVE PERCENT: 4.3 % (ref 2–12)
NEUTROPHILS ABSOLUTE: 2.27 E9/L (ref 1.8–7.3)
NEUTROPHILS RELATIVE PERCENT: 65.3 % (ref 43–80)
OVALOCYTES: ABNORMAL
PDW BLD-RTO: 16.7 FL (ref 11.5–15)
PLATELET # BLD: 132 E9/L (ref 130–450)
PMV BLD AUTO: 10.6 FL (ref 7–12)
POIKILOCYTES: ABNORMAL
POLYCHROMASIA: ABNORMAL
POTASSIUM SERPL-SCNC: 4.3 MMOL/L (ref 3.5–5)
RBC # BLD: 2.93 E12/L (ref 3.5–5.5)
SODIUM BLD-SCNC: 136 MMOL/L (ref 132–146)
TEAR DROP CELLS: ABNORMAL
TOTAL PROTEIN: 6.3 G/DL (ref 6.4–8.3)
WBC # BLD: 3.5 E9/L (ref 4.5–11.5)

## 2021-03-04 ENCOUNTER — HOSPITAL ENCOUNTER (OUTPATIENT)
Dept: ULTRASOUND IMAGING | Age: 86
Discharge: HOME | End: 2021-03-04
Payer: MEDICARE

## 2021-03-04 ENCOUNTER — APPOINTMENT (OUTPATIENT)
Dept: ULTRASOUND IMAGING | Age: 86
End: 2021-03-04
Payer: MEDICARE

## 2021-03-04 ENCOUNTER — HOSPITAL ENCOUNTER (OUTPATIENT)
Dept: MAMMOGRAPHY | Age: 86
Discharge: HOME | End: 2021-03-06
Payer: MEDICARE

## 2021-03-04 DIAGNOSIS — N64.89: ICD-10-CM

## 2021-03-04 DIAGNOSIS — N64.89 BREAST ASYMMETRY: ICD-10-CM

## 2021-03-04 DIAGNOSIS — D45 POLYCYTHEMIA VERA (HCC): ICD-10-CM

## 2021-03-04 LAB
ALBUMIN SERPL-MCNC: 3.8 G/DL (ref 3.5–5.2)
ALP BLD-CCNC: 61 U/L (ref 35–104)
ALT SERPL-CCNC: 13 U/L (ref 0–32)
ANION GAP SERPL CALCULATED.3IONS-SCNC: 13 MMOL/L (ref 7–16)
ANISOCYTOSIS: ABNORMAL
AST SERPL-CCNC: 19 U/L (ref 0–31)
BASOPHILS ABSOLUTE: 0.03 E9/L (ref 0–0.2)
BASOPHILS RELATIVE PERCENT: 0.9 % (ref 0–2)
BILIRUB SERPL-MCNC: 0.6 MG/DL (ref 0–1.2)
BUN BLDV-MCNC: 25 MG/DL (ref 8–23)
BURR CELLS: ABNORMAL
CALCIUM SERPL-MCNC: 9.8 MG/DL (ref 8.6–10.2)
CHLORIDE BLD-SCNC: 101 MMOL/L (ref 98–107)
CO2: 23 MMOL/L (ref 22–29)
CREAT SERPL-MCNC: 1.3 MG/DL (ref 0.5–1)
EOSINOPHILS ABSOLUTE: 0.03 E9/L (ref 0.05–0.5)
EOSINOPHILS RELATIVE PERCENT: 0.9 % (ref 0–6)
GFR AFRICAN AMERICAN: 47
GFR NON-AFRICAN AMERICAN: 39 ML/MIN/1.73
GLUCOSE BLD-MCNC: 189 MG/DL (ref 74–99)
HCT VFR BLD CALC: 35.4 % (ref 34–48)
HEMOGLOBIN: 12.1 G/DL (ref 11.5–15.5)
IMMATURE GRANULOCYTES #: 0.01 E9/L
IMMATURE GRANULOCYTES %: 0.3 % (ref 0–5)
LYMPHOCYTES ABSOLUTE: 0.88 E9/L (ref 1.5–4)
LYMPHOCYTES RELATIVE PERCENT: 25.6 % (ref 20–42)
MCH RBC QN AUTO: 44 PG (ref 26–35)
MCHC RBC AUTO-ENTMCNC: 34.2 % (ref 32–34.5)
MCV RBC AUTO: 128.7 FL (ref 80–99.9)
MONOCYTES ABSOLUTE: 0.12 E9/L (ref 0.1–0.95)
MONOCYTES RELATIVE PERCENT: 3.5 % (ref 2–12)
NEUTROPHILS ABSOLUTE: 2.37 E9/L (ref 1.8–7.3)
NEUTROPHILS RELATIVE PERCENT: 68.8 % (ref 43–80)
OVALOCYTES: ABNORMAL
PDW BLD-RTO: 14.4 FL (ref 11.5–15)
PLATELET # BLD: 145 E9/L (ref 130–450)
PMV BLD AUTO: 10.5 FL (ref 7–12)
POIKILOCYTES: ABNORMAL
POTASSIUM SERPL-SCNC: 4.6 MMOL/L (ref 3.5–5)
RBC # BLD: 2.75 E12/L (ref 3.5–5.5)
SODIUM BLD-SCNC: 137 MMOL/L (ref 132–146)
TOTAL PROTEIN: 6.3 G/DL (ref 6.4–8.3)
WBC # BLD: 3.4 E9/L (ref 4.5–11.5)

## 2021-03-04 PROCEDURE — G0279 TOMOSYNTHESIS, MAMMO: HCPCS

## 2021-03-22 DIAGNOSIS — D45 POLYCYTHEMIA VERA (HCC): ICD-10-CM

## 2021-03-22 RX ORDER — HYDROXYUREA 500 MG/1
500 CAPSULE ORAL 2 TIMES DAILY
Qty: 60 CAPSULE | Refills: 0 | Status: SHIPPED
Start: 2021-03-22 | End: 2021-04-14 | Stop reason: SDUPTHER

## 2021-03-31 DIAGNOSIS — D45 POLYCYTHEMIA VERA (HCC): ICD-10-CM

## 2021-03-31 LAB
ALBUMIN SERPL-MCNC: 4.2 G/DL (ref 3.5–5.2)
ALP BLD-CCNC: 63 U/L (ref 35–104)
ALT SERPL-CCNC: 14 U/L (ref 0–32)
ANION GAP SERPL CALCULATED.3IONS-SCNC: 18 MMOL/L (ref 7–16)
ANISOCYTOSIS: ABNORMAL
AST SERPL-CCNC: 19 U/L (ref 0–31)
BASOPHILS ABSOLUTE: 0.04 E9/L (ref 0–0.2)
BASOPHILS RELATIVE PERCENT: 1 % (ref 0–2)
BILIRUB SERPL-MCNC: 0.5 MG/DL (ref 0–1.2)
BUN BLDV-MCNC: 23 MG/DL (ref 8–23)
BURR CELLS: ABNORMAL
CALCIUM SERPL-MCNC: 9.8 MG/DL (ref 8.6–10.2)
CHLORIDE BLD-SCNC: 104 MMOL/L (ref 98–107)
CO2: 23 MMOL/L (ref 22–29)
CREAT SERPL-MCNC: 1.3 MG/DL (ref 0.5–1)
EOSINOPHILS ABSOLUTE: 0.03 E9/L (ref 0.05–0.5)
EOSINOPHILS RELATIVE PERCENT: 0.8 % (ref 0–6)
GFR AFRICAN AMERICAN: 47
GFR NON-AFRICAN AMERICAN: 39 ML/MIN/1.73
GLUCOSE BLD-MCNC: 140 MG/DL (ref 74–99)
HCT VFR BLD CALC: 36.9 % (ref 34–48)
HEMOGLOBIN: 13 G/DL (ref 11.5–15.5)
IMMATURE GRANULOCYTES #: 0.02 E9/L
IMMATURE GRANULOCYTES %: 0.5 % (ref 0–5)
LYMPHOCYTES ABSOLUTE: 1.21 E9/L (ref 1.5–4)
LYMPHOCYTES RELATIVE PERCENT: 30.4 % (ref 20–42)
MCH RBC QN AUTO: 46.8 PG (ref 26–35)
MCHC RBC AUTO-ENTMCNC: 35.2 % (ref 32–34.5)
MCV RBC AUTO: 132.7 FL (ref 80–99.9)
MONOCYTES ABSOLUTE: 0.18 E9/L (ref 0.1–0.95)
MONOCYTES RELATIVE PERCENT: 4.5 % (ref 2–12)
NEUTROPHILS ABSOLUTE: 2.5 E9/L (ref 1.8–7.3)
NEUTROPHILS RELATIVE PERCENT: 62.8 % (ref 43–80)
OVALOCYTES: ABNORMAL
PDW BLD-RTO: 14.1 FL (ref 11.5–15)
PLATELET # BLD: 152 E9/L (ref 130–450)
PMV BLD AUTO: 10.6 FL (ref 7–12)
POIKILOCYTES: ABNORMAL
POTASSIUM SERPL-SCNC: 5.3 MMOL/L (ref 3.5–5)
RBC # BLD: 2.78 E12/L (ref 3.5–5.5)
SODIUM BLD-SCNC: 145 MMOL/L (ref 132–146)
TOTAL PROTEIN: 6.6 G/DL (ref 6.4–8.3)
WBC # BLD: 4 E9/L (ref 4.5–11.5)

## 2021-04-05 ENCOUNTER — HOSPITAL ENCOUNTER (OUTPATIENT)
Dept: INFUSION THERAPY | Age: 86
End: 2021-04-05
Payer: MEDICARE

## 2021-04-14 ENCOUNTER — HOSPITAL ENCOUNTER (OUTPATIENT)
Dept: INFUSION THERAPY | Age: 86
Discharge: HOME | End: 2021-04-14
Payer: MEDICARE

## 2021-04-14 ENCOUNTER — OFFICE VISIT (OUTPATIENT)
Dept: ONCOLOGY | Age: 86
End: 2021-04-14
Payer: MEDICARE

## 2021-04-14 VITALS
TEMPERATURE: 98 F | OXYGEN SATURATION: 96 % | HEIGHT: 63 IN | BODY MASS INDEX: 27.12 KG/M2 | WEIGHT: 153.1 LBS | SYSTOLIC BLOOD PRESSURE: 134 MMHG | HEART RATE: 73 BPM | DIASTOLIC BLOOD PRESSURE: 52 MMHG

## 2021-04-14 DIAGNOSIS — D45 POLYCYTHEMIA VERA (HCC): Primary | ICD-10-CM

## 2021-04-14 DIAGNOSIS — D45 POLYCYTHEMIA VERA (HCC): ICD-10-CM

## 2021-04-14 PROCEDURE — 99212 OFFICE O/P EST SF 10 MIN: CPT

## 2021-04-14 RX ORDER — HYDROXYUREA 500 MG/1
500 CAPSULE ORAL 2 TIMES DAILY
Qty: 60 CAPSULE | Refills: 0 | Status: SHIPPED
Start: 2021-04-14 | End: 2021-06-04 | Stop reason: SDUPTHER

## 2021-04-14 NOTE — PROGRESS NOTES
mouth daily Indications: plus 10 mcg D3 Patient is taking 3 tablets daily    Historical Provider, MD   lisinopril (PRINIVIL;ZESTRIL) 10 MG tablet Take 10 mg by mouth daily    Historical Provider, MD   escitalopram (LEXAPRO) 20 MG tablet Take 20 mg by mouth daily    Historical Provider, MD   pantoprazole (PROTONIX) 40 MG tablet Take 40 mg by mouth daily    Historical Provider, MD   colestipol (COLESTID) 1 g tablet Take 2 g by mouth 2 times daily     Historical Provider, MD    Scheduled Meds:  Continuous Infusions:  PRN Meds:.        Recent Laboratory Data-     Lab Results   Component Value Date    WBC 4.0 (L) 03/31/2021    HGB 13.0 03/31/2021    HCT 36.9 03/31/2021    .7 (H) 03/31/2021     03/31/2021    LYMPHOPCT 30.4 03/31/2021    RBC 2.78 (L) 03/31/2021    MCH 46.8 (H) 03/31/2021    MCHC 35.2 (H) 03/31/2021    RDW 14.1 03/31/2021    NEUTOPHILPCT 62.8 03/31/2021    MONOPCT 4.5 03/31/2021    BASOPCT 1.0 03/31/2021    NEUTROABS 2.50 03/31/2021    LYMPHSABS 1.21 (L) 03/31/2021    MONOSABS 0.18 03/31/2021    EOSABS 0.03 (L) 03/31/2021    BASOSABS 0.04 03/31/2021       Lab Results   Component Value Date     03/31/2021    K 5.3 (H) 03/31/2021     03/31/2021    CO2 23 03/31/2021    BUN 23 03/31/2021    CREATININE 1.3 (H) 03/31/2021    GLUCOSE 140 (H) 03/31/2021    CALCIUM 9.8 03/31/2021    PROT 6.6 03/31/2021    LABALBU 4.2 03/31/2021    BILITOT 0.5 03/31/2021    ALKPHOS 63 03/31/2021    AST 19 03/31/2021    ALT 14 03/31/2021    LABGLOM 39 03/31/2021    GFRAA 47 03/31/2021         QUALITATIVE JAK2 MUTATION:  POSITIVE.      BCR/ABL1 Interphase FISH:  NEGATIVE          Radiology-  US RIGHT BREAST:  8/24/2020  1. 2 tiny cystic lesions including simple cyst at the 3 o'clock position and    mildly complex cyst at the 6 o'clock patient vision.  Both of these measure 4    mm.     2. Otherwise, unremarkable right breast ultrasound and diagnostic mammogram.    Please note that a negative report should not delay biopsy if there is a    clinically suspicious palpable abnormality.  Unless otherwise clinically    indicated, follow-up mammography and ultrasound of the right breast in 6    months is suggested.         BIRADS:    BIRADS - CATEGORY 3         Findings are probably benign. A short interval follow-up is recommended in 6    months.         OVERALL ASSESSMENT - PROBABLY BENIGN. DIGITAL AILIN RIGHT MAMMOGRAM: 8/24/2020  No radiographic evidence of malignancy.  The asymmetric density seen on the    recent screening mammogram is not confirmed on the additional views. Subsequent ultrasound study only showed a couple of tiny cystic lesions with    no suspicious mass seen.  Unless otherwise clinically indicated, follow-up    mammography and ultrasound of the right breast in 6 months is suggested.         BIRADS:    BIRADS - CATEGORY 3         Findings are probably benign. A short interval follow-up is recommended in 6    months.         OVERALL ASSESSMENT - PROBABLY BENIGN. BILATERAL SCREENING MAMMOGRAM:  8/04/2020.  1. Approximately 1 cm asymmetry seen within the medial aspect of the right    breast 4.7 cm from the nipple.  The asymmetry is only seen on the CC view. Dedicated diagnostic mammogram of the right breast is recommended. 2. Stable mammogram of the left breast.  Annual screening mammography left    breast is recommended.         BIRADS:    BIRADS - CATEGORY 0         Additional imaging is recommended at this time.         OVERALL ASSESSMENT - INCOMPLETE:NEED ADDITIONAL IMAGING EVALUATION.                 ASSESSMENT/PLAN :  Polycythemia with borderline neutrophilic leukocytosis.     She has no clinical suggestion of reactive compensatory secondary polycythemia with absence of prior history of smoking COPD or sleep apnea     Rule out primary myeloproliferative disorder such as P vera.   Rule out CML doubt     To check CBC, blood smear review, baseline EPO level, qualitative JAK2 mutation as well as BCR ABL by FISH. We will follow    Her EPO level was low at 1. Qualitative Jose Guadalupe 2 mutation was positive  BCRABL by FISH was negative    She has polycythemia vera  Hemoglobin was elevated at 17.9 on 3/9/2020    . Hemoglobin was elevated at 17.7 on 3/30/2020 and she underwent a  therapeutic phlebotomy and 250 cc of blood was removed under medical supervision. She was initiated on hydroxyurea 500 mg daily. Hemoglobin was elevated at 18.0  on 4/24/2020 and she underwent a  therapeutic phlebotomy and 250 cc of blood was  removed under medical supervision. Her Hydrea was increased to 500 mg twice daily. She was given PRN Zofran for nausea    Hgb was 16 on 5/27/2020 and she underwent phlebotomy with a target hemoglobin of 15  She will be maintained on Hydrea 500 mg twice daily. Hgb was 11.7  on  7/28/2020  Hydrea dose was decreased to 500 mg daily    Hgb was 12.7  on  8/25/2020  Hgb was 14.3  on  9/22/2020. No phlebotomy warranted to continue Hydrea 500 mg daily      11/09/2020  Hgb was 15.9; Hct was 45.5  on 10/27/2020  She is to undergo a  therapeutic phlebotomy and 250 cc of blood will be  removed under medical supervision with a target hemoglobin of 15. Her Hydrea dose will be increased back to 500 mg twice daily      4/14/2021  CBC today reviewed and is in the normal range and no phlebotomy indicated. She is to continue on Hydrea 500 mg twice daily    Cher Shields M.D., F.A.C.P.   Electronically signed 4/14/2021 at 8:12 AM

## 2021-05-17 DIAGNOSIS — D45 POLYCYTHEMIA VERA (HCC): ICD-10-CM

## 2021-05-17 LAB
ALBUMIN SERPL-MCNC: 3.9 G/DL (ref 3.5–5.2)
ALP BLD-CCNC: 62 U/L (ref 35–104)
ALT SERPL-CCNC: 16 U/L (ref 0–32)
ANION GAP SERPL CALCULATED.3IONS-SCNC: 12 MMOL/L (ref 7–16)
ANISOCYTOSIS: ABNORMAL
AST SERPL-CCNC: 19 U/L (ref 0–31)
BASOPHILS ABSOLUTE: 0.04 E9/L (ref 0–0.2)
BASOPHILS RELATIVE PERCENT: 1.2 % (ref 0–2)
BILIRUB SERPL-MCNC: 0.5 MG/DL (ref 0–1.2)
BUN BLDV-MCNC: 26 MG/DL (ref 6–23)
CALCIUM SERPL-MCNC: 9.9 MG/DL (ref 8.6–10.2)
CHLORIDE BLD-SCNC: 102 MMOL/L (ref 98–107)
CO2: 25 MMOL/L (ref 22–29)
CREAT SERPL-MCNC: 1.2 MG/DL (ref 0.5–1)
EOSINOPHILS ABSOLUTE: 0.03 E9/L (ref 0.05–0.5)
EOSINOPHILS RELATIVE PERCENT: 0.9 % (ref 0–6)
GFR AFRICAN AMERICAN: 52
GFR NON-AFRICAN AMERICAN: 43 ML/MIN/1.73
GLUCOSE BLD-MCNC: 107 MG/DL (ref 74–99)
HCT VFR BLD CALC: 36.2 % (ref 34–48)
HEMOGLOBIN: 12.2 G/DL (ref 11.5–15.5)
IMMATURE GRANULOCYTES #: 0.01 E9/L
IMMATURE GRANULOCYTES %: 0.3 % (ref 0–5)
LYMPHOCYTES ABSOLUTE: 0.95 E9/L (ref 1.5–4)
LYMPHOCYTES RELATIVE PERCENT: 28.2 % (ref 20–42)
MCH RBC QN AUTO: 44.7 PG (ref 26–35)
MCHC RBC AUTO-ENTMCNC: 33.7 % (ref 32–34.5)
MCV RBC AUTO: 132.6 FL (ref 80–99.9)
MONOCYTES ABSOLUTE: 0.16 E9/L (ref 0.1–0.95)
MONOCYTES RELATIVE PERCENT: 4.7 % (ref 2–12)
NEUTROPHILS ABSOLUTE: 2.18 E9/L (ref 1.8–7.3)
NEUTROPHILS RELATIVE PERCENT: 64.7 % (ref 43–80)
OVALOCYTES: ABNORMAL
PDW BLD-RTO: 13.8 FL (ref 11.5–15)
PLATELET # BLD: 139 E9/L (ref 130–450)
PMV BLD AUTO: 10.2 FL (ref 7–12)
POIKILOCYTES: ABNORMAL
POTASSIUM SERPL-SCNC: 5 MMOL/L (ref 3.5–5)
RBC # BLD: 2.73 E12/L (ref 3.5–5.5)
SODIUM BLD-SCNC: 139 MMOL/L (ref 132–146)
TOTAL PROTEIN: 6.3 G/DL (ref 6.4–8.3)
WBC # BLD: 3.4 E9/L (ref 4.5–11.5)

## 2021-06-03 DIAGNOSIS — D45 POLYCYTHEMIA VERA (HCC): ICD-10-CM

## 2021-06-04 DIAGNOSIS — D45 POLYCYTHEMIA VERA (HCC): ICD-10-CM

## 2021-06-04 RX ORDER — HYDROXYUREA 500 MG/1
500 CAPSULE ORAL 2 TIMES DAILY
Qty: 60 CAPSULE | Refills: 0 | Status: SHIPPED | OUTPATIENT
Start: 2021-06-04 | End: 2021-06-29 | Stop reason: SDUPTHER

## 2021-06-28 LAB
ALBUMIN SERPL-MCNC: 3.9 G/DL (ref 3.5–5.2)
ALP BLD-CCNC: 66 U/L (ref 35–104)
ALT SERPL-CCNC: 14 U/L (ref 0–32)
ANION GAP SERPL CALCULATED.3IONS-SCNC: 13 MMOL/L (ref 7–16)
ANISOCYTOSIS: ABNORMAL
AST SERPL-CCNC: 18 U/L (ref 0–31)
BASOPHILS ABSOLUTE: 0.06 E9/L (ref 0–0.2)
BASOPHILS RELATIVE PERCENT: 1.7 % (ref 0–2)
BILIRUB SERPL-MCNC: 0.4 MG/DL (ref 0–1.2)
BUN BLDV-MCNC: 23 MG/DL (ref 6–23)
CALCIUM SERPL-MCNC: 9.6 MG/DL (ref 8.6–10.2)
CHLORIDE BLD-SCNC: 102 MMOL/L (ref 98–107)
CO2: 22 MMOL/L (ref 22–29)
CREAT SERPL-MCNC: 1.2 MG/DL (ref 0.5–1)
EOSINOPHILS ABSOLUTE: 0 E9/L (ref 0.05–0.5)
EOSINOPHILS RELATIVE PERCENT: 0.9 % (ref 0–6)
GFR AFRICAN AMERICAN: 52
GFR NON-AFRICAN AMERICAN: 43 ML/MIN/1.73
GLUCOSE BLD-MCNC: 186 MG/DL (ref 74–99)
HCT VFR BLD CALC: 37 % (ref 34–48)
HEMOGLOBIN: 12.6 G/DL (ref 11.5–15.5)
LYMPHOCYTES ABSOLUTE: 0.95 E9/L (ref 1.5–4)
LYMPHOCYTES RELATIVE PERCENT: 27.8 % (ref 20–42)
MCH RBC QN AUTO: 45.3 PG (ref 26–35)
MCHC RBC AUTO-ENTMCNC: 34.1 % (ref 32–34.5)
MCV RBC AUTO: 133.1 FL (ref 80–99.9)
MONOCYTES ABSOLUTE: 0.14 E9/L (ref 0.1–0.95)
MONOCYTES RELATIVE PERCENT: 4.3 % (ref 2–12)
NEUTROPHILS ABSOLUTE: 2.24 E9/L (ref 1.8–7.3)
NEUTROPHILS RELATIVE PERCENT: 66.1 % (ref 43–80)
OVALOCYTES: ABNORMAL
PDW BLD-RTO: 14 FL (ref 11.5–15)
PLATELET # BLD: 133 E9/L (ref 130–450)
PMV BLD AUTO: 10.5 FL (ref 7–12)
POIKILOCYTES: ABNORMAL
POTASSIUM SERPL-SCNC: 4.9 MMOL/L (ref 3.5–5)
RBC # BLD: 2.78 E12/L (ref 3.5–5.5)
SODIUM BLD-SCNC: 137 MMOL/L (ref 132–146)
TOTAL PROTEIN: 6.6 G/DL (ref 6.4–8.3)
WBC # BLD: 3.4 E9/L (ref 4.5–11.5)

## 2021-06-29 DIAGNOSIS — D45 POLYCYTHEMIA VERA (HCC): ICD-10-CM

## 2021-06-29 RX ORDER — HYDROXYUREA 500 MG/1
500 CAPSULE ORAL 2 TIMES DAILY
Qty: 60 CAPSULE | Refills: 0 | Status: SHIPPED | OUTPATIENT
Start: 2021-06-29 | End: 2021-08-05 | Stop reason: SDUPTHER

## 2021-08-03 LAB
ALBUMIN SERPL-MCNC: 4.1 G/DL (ref 3.5–5.2)
ALP BLD-CCNC: 63 U/L (ref 35–104)
ALT SERPL-CCNC: 22 U/L (ref 0–32)
ANION GAP SERPL CALCULATED.3IONS-SCNC: 9 MMOL/L (ref 7–16)
ANISOCYTOSIS: ABNORMAL
AST SERPL-CCNC: 24 U/L (ref 0–31)
BASOPHILS ABSOLUTE: 0.04 E9/L (ref 0–0.2)
BASOPHILS RELATIVE PERCENT: 1.2 % (ref 0–2)
BILIRUB SERPL-MCNC: 0.6 MG/DL (ref 0–1.2)
BUN BLDV-MCNC: 17 MG/DL (ref 6–23)
BURR CELLS: ABNORMAL
CALCIUM SERPL-MCNC: 9.6 MG/DL (ref 8.6–10.2)
CHLORIDE BLD-SCNC: 102 MMOL/L (ref 98–107)
CO2: 25 MMOL/L (ref 22–29)
CREAT SERPL-MCNC: 1.1 MG/DL (ref 0.5–1)
EOSINOPHILS ABSOLUTE: 0.04 E9/L (ref 0.05–0.5)
EOSINOPHILS RELATIVE PERCENT: 1.2 % (ref 0–6)
GFR AFRICAN AMERICAN: 57
GFR NON-AFRICAN AMERICAN: 47 ML/MIN/1.73
GLUCOSE BLD-MCNC: 137 MG/DL (ref 74–99)
HCT VFR BLD CALC: 36.1 % (ref 34–48)
HEMOGLOBIN: 12.6 G/DL (ref 11.5–15.5)
IMMATURE GRANULOCYTES #: 0.01 E9/L
IMMATURE GRANULOCYTES %: 0.3 % (ref 0–5)
LYMPHOCYTES ABSOLUTE: 0.93 E9/L (ref 1.5–4)
LYMPHOCYTES RELATIVE PERCENT: 27.8 % (ref 20–42)
MCH RBC QN AUTO: 45.7 PG (ref 26–35)
MCHC RBC AUTO-ENTMCNC: 34.9 % (ref 32–34.5)
MCV RBC AUTO: 130.8 FL (ref 80–99.9)
MONOCYTES ABSOLUTE: 0.15 E9/L (ref 0.1–0.95)
MONOCYTES RELATIVE PERCENT: 4.5 % (ref 2–12)
NEUTROPHILS ABSOLUTE: 2.17 E9/L (ref 1.8–7.3)
NEUTROPHILS RELATIVE PERCENT: 65 % (ref 43–80)
OVALOCYTES: ABNORMAL
PDW BLD-RTO: 14.6 FL (ref 11.5–15)
PLATELET # BLD: 132 E9/L (ref 130–450)
PMV BLD AUTO: 10.2 FL (ref 7–12)
POIKILOCYTES: ABNORMAL
POLYCHROMASIA: ABNORMAL
POTASSIUM SERPL-SCNC: 4.7 MMOL/L (ref 3.5–5)
RBC # BLD: 2.76 E12/L (ref 3.5–5.5)
SODIUM BLD-SCNC: 136 MMOL/L (ref 132–146)
TEAR DROP CELLS: ABNORMAL
TOTAL PROTEIN: 6.7 G/DL (ref 6.4–8.3)
WBC # BLD: 3.3 E9/L (ref 4.5–11.5)

## 2021-08-05 DIAGNOSIS — D45 POLYCYTHEMIA VERA (HCC): ICD-10-CM

## 2021-08-05 RX ORDER — HYDROXYUREA 500 MG/1
500 CAPSULE ORAL 2 TIMES DAILY
Qty: 60 CAPSULE | Refills: 0 | Status: SHIPPED | OUTPATIENT
Start: 2021-08-05 | End: 2021-09-07 | Stop reason: SDUPTHER

## 2021-09-07 DIAGNOSIS — D45 POLYCYTHEMIA VERA (HCC): ICD-10-CM

## 2021-09-07 RX ORDER — HYDROXYUREA 500 MG/1
500 CAPSULE ORAL 2 TIMES DAILY
Qty: 60 CAPSULE | Refills: 1 | Status: SHIPPED | OUTPATIENT
Start: 2021-09-07 | End: 2021-11-06

## 2021-09-13 DIAGNOSIS — D45 POLYCYTHEMIA VERA (HCC): Primary | ICD-10-CM

## 2021-09-15 ENCOUNTER — OFFICE VISIT (OUTPATIENT)
Dept: ONCOLOGY | Age: 86
End: 2021-09-15
Payer: MEDICARE

## 2021-09-15 VITALS
WEIGHT: 156.2 LBS | BODY MASS INDEX: 27.68 KG/M2 | HEIGHT: 63 IN | TEMPERATURE: 97.5 F | SYSTOLIC BLOOD PRESSURE: 150 MMHG | HEART RATE: 76 BPM | DIASTOLIC BLOOD PRESSURE: 60 MMHG

## 2021-09-15 DIAGNOSIS — D45 POLYCYTHEMIA VERA (HCC): Primary | ICD-10-CM

## 2021-09-15 PROCEDURE — 99212 OFFICE O/P EST SF 10 MIN: CPT

## 2021-09-15 RX ORDER — HYDROXYUREA 500 MG/1
500 CAPSULE ORAL DAILY
Qty: 90 CAPSULE | Refills: 0 | Status: SHIPPED
Start: 2021-09-15 | End: 2021-10-27 | Stop reason: SDUPTHER

## 2021-09-15 NOTE — PROGRESS NOTES
[address] [facility]. [facility] [place]        Pt Name: Gisele Rangel  YOB: 1935  Date of evaluation: 9/15/2021  Primary Care Physician: Jami Brink DO  Reason for evaluation:   Chief Complaint   Patient presents with    Follow-up     polycythemia vera        Subjective: Here for  follow up. Tolerating Hydrea well. OBJECTIVE:  VITALS:  height is 5' 3\" (1.6 m) and weight is 156 lb 3.2 oz (70.9 kg). Her infrared temperature is 97.5 °F (36.4 °C). Her blood pressure is 150/60 (abnormal) and her pulse is 76. Physical Exam:  Performance Status: 0  Well developed, well nourished female  General: AAO to person, place, time, in no acute distress. Head and neck: PERRLA, EOMI . Sclera non icteric. Oropharynx: Clear. Neck: no JVD,  no adenopathy. Heart: Regular rate and regular rhythm. No murmur. Lungs: Clear to auscultation. Abdomen: Soft, non-tender;no masses, no organomegaly. Extremities: No edema,no cyanosis, no clubbing. Neurologic:Cranial nerves grossly intact. No focal motor or sensory deficits. Skin:  No rash. Medications  Prior to Admission medications    Medication Sig Start Date End Date Taking?  Authorizing Provider   hydroxyurea (HYDREA) 500 MG chemo capsule Take 1 capsule by mouth 2 times daily 9/7/21 11/6/21 Yes AMADA Akins - CNP   ondansetron (ZOFRAN) 4 MG tablet Take 1 tablet by mouth every 12 hours as needed for Nausea or Vomiting 4/27/20  Yes Debbie Beatty MD   Multiple Vitamins-Minerals (THERAPEUTIC MULTIVITAMIN-MINERALS) tablet Take 1 tablet by mouth daily   Yes Historical Provider, MD   atorvastatin (LIPITOR) 40 MG tablet Take 40 mg by mouth daily   Yes Historical Provider, MD   aspirin 81 MG EC tablet Take 81 mg by mouth daily   Yes Historical Provider, MD   nitrofurantoin (MACRODANTIN) 100 MG capsule Take 100 mg by mouth nightly    Yes Historical Provider, MD   traMADol (ULTRAM) 50 MG tablet Take 50 mg by mouth every 4    mm.    2. Otherwise, unremarkable right breast ultrasound and diagnostic mammogram.    Please note that a negative report should not delay biopsy if there is a    clinically suspicious palpable abnormality.  Unless otherwise clinically    indicated, follow-up mammography and ultrasound of the right breast in 6    months is suggested.         BIRADS:    BIRADS - CATEGORY 3         Findings are probably benign. A short interval follow-up is recommended in 6    months.         OVERALL ASSESSMENT - PROBABLY BENIGN. DIGITAL AILIN RIGHT MAMMOGRAM: 8/24/2020  No radiographic evidence of malignancy.  The asymmetric density seen on the    recent screening mammogram is not confirmed on the additional views. Subsequent ultrasound study only showed a couple of tiny cystic lesions with    no suspicious mass seen.  Unless otherwise clinically indicated, follow-up    mammography and ultrasound of the right breast in 6 months is suggested.         BIRADS:    BIRADS - CATEGORY 3         Findings are probably benign. A short interval follow-up is recommended in 6    months.         OVERALL ASSESSMENT - PROBABLY BENIGN. BILATERAL SCREENING MAMMOGRAM:  8/04/2020.  1. Approximately 1 cm asymmetry seen within the medial aspect of the right    breast 4.7 cm from the nipple.  The asymmetry is only seen on the CC view. Dedicated diagnostic mammogram of the right breast is recommended.     2. Stable mammogram of the left breast.  Annual screening mammography left    breast is recommended.         BIRADS:    BIRADS - CATEGORY 0         Additional imaging is recommended at this time.         OVERALL ASSESSMENT - INCOMPLETE:NEED ADDITIONAL IMAGING EVALUATION.                 ASSESSMENT/PLAN :  Polycythemia with borderline neutrophilic leukocytosis.     She has no clinical suggestion of reactive compensatory secondary polycythemia with absence of prior history of smoking COPD or sleep apnea     Rule out primary myeloproliferative disorder such as P vera. Rule out CML doubt     To check CBC, blood smear review, baseline EPO level, qualitative JAK2 mutation as well as BCR ABL by FISH. We will follow    Her EPO level was low at 1. Qualitative Jose Guadalupe 2 mutation was positive  BCRABL by FISH was negative    She has polycythemia vera  Hemoglobin was elevated at 17.9 on 3/9/2020    . Hemoglobin was elevated at 17.7 on 3/30/2020 and she underwent a  therapeutic phlebotomy and 250 cc of blood was removed under medical supervision. She was initiated on hydroxyurea 500 mg daily. Hemoglobin was elevated at 18.0  on 4/24/2020 and she underwent a  therapeutic phlebotomy and 250 cc of blood was  removed under medical supervision. Her Hydrea was increased to 500 mg twice daily. She was given PRN Zofran for nausea    Hgb was 16 on 5/27/2020 and she underwent phlebotomy with a target hemoglobin of 15  She will be maintained on Hydrea 500 mg twice daily. Hgb was 11.7  on  7/28/2020  Hydrea dose was decreased to 500 mg daily    Hgb was 12.7  on  8/25/2020  Hgb was 14.3  on  9/22/2020. No phlebotomy warranted to continue Hydrea 500 mg daily      11/09/2020  Hgb was 15.9; Hct was 45.5  on 10/27/2020  She is to undergo a  therapeutic phlebotomy and 250 cc of blood will be  removed under medical supervision with a target hemoglobin of 15. Her Hydrea dose will be increased back to 500 mg twice daily      4/14/2021  CBC today reviewed and is in the normal range and no phlebotomy indicated. She is to continue on Hydrea 500 mg twice daily    9/15/2021  Tolerating Hydrea well. CBC shows leukopenia with a hemoglobin of 11.8 elevated MCV and a platelet count of 824    To decrease Hydrea dose to 500 mg daily  To have follow-up CBC in 6 weeks    Adryan Joaquin. Caitlin Cabrales M.D., F.A.C.P.   Electronically signed 9/15/2021 at 11:25 AM

## 2021-10-04 ENCOUNTER — HOSPITAL ENCOUNTER (OUTPATIENT)
Dept: MAMMOGRAPHY | Age: 86
Discharge: HOME | End: 2021-10-06
Payer: MEDICARE

## 2021-10-04 VITALS — WEIGHT: 145 LBS | BODY MASS INDEX: 24.75 KG/M2 | HEIGHT: 64 IN

## 2021-10-04 DIAGNOSIS — Z12.31 SCREENING MAMMOGRAM, ENCOUNTER FOR: ICD-10-CM

## 2021-10-04 DIAGNOSIS — Z13.820 OSTEOPOROSIS SCREENING: ICD-10-CM

## 2021-10-04 DIAGNOSIS — M81.0 SENILE OSTEOPOROSIS: ICD-10-CM

## 2021-10-04 DIAGNOSIS — Z12.31 SCREENING MAMMOGRAM FOR HIGH-RISK PATIENT: ICD-10-CM

## 2021-10-04 PROCEDURE — 77067 SCR MAMMO BI INCL CAD: CPT

## 2021-10-04 PROCEDURE — 77080 DXA BONE DENSITY AXIAL: CPT

## 2021-10-06 RX ORDER — ALENDRONATE SODIUM 70 MG/1
70 TABLET ORAL WEEKLY
Qty: 12 TABLET | Refills: 0 | Status: SHIPPED
Start: 2021-10-06 | End: 2022-01-11 | Stop reason: SDUPTHER

## 2021-10-06 NOTE — TELEPHONE ENCOUNTER
----- Message from Carolyn Winter DO sent at 10/5/2021  8:20 PM EDT -----  Call patient. Bone density suggestive of osteoporosis. I believe she is already on calcium with vitamin D. Consider Fosamax 70 mg weekly. Repeat bone density in 2 years. Mammogram was unremarkable with a repeat annual screening mammogram recommended.

## 2021-10-06 NOTE — TELEPHONE ENCOUNTER
pc to patient will test findings. Mammogram essentially normal recheck annually. Dexa scan shows osteoporosis she is willing to try Fosamax 70mg weekly.

## 2021-10-21 RX ORDER — LISINOPRIL 10 MG/1
TABLET ORAL
Qty: 90 TABLET | Refills: 0 | Status: SHIPPED
Start: 2021-10-21 | End: 2021-12-20

## 2021-10-26 DIAGNOSIS — D45 POLYCYTHEMIA VERA (HCC): ICD-10-CM

## 2021-10-26 LAB
ALBUMIN SERPL-MCNC: 4.1 G/DL (ref 3.5–5.2)
ALP BLD-CCNC: 65 U/L (ref 35–104)
ALT SERPL-CCNC: 15 U/L (ref 0–32)
ANION GAP SERPL CALCULATED.3IONS-SCNC: 12 MMOL/L (ref 7–16)
AST SERPL-CCNC: 18 U/L (ref 0–31)
BASOPHILS ABSOLUTE: 0.04 E9/L (ref 0–0.2)
BASOPHILS RELATIVE PERCENT: 0.8 % (ref 0–2)
BILIRUB SERPL-MCNC: 0.5 MG/DL (ref 0–1.2)
BUN BLDV-MCNC: 20 MG/DL (ref 6–23)
CALCIUM SERPL-MCNC: 9.9 MG/DL (ref 8.6–10.2)
CHLORIDE BLD-SCNC: 100 MMOL/L (ref 98–107)
CO2: 22 MMOL/L (ref 22–29)
CREAT SERPL-MCNC: 1.2 MG/DL (ref 0.5–1)
EOSINOPHILS ABSOLUTE: 0.07 E9/L (ref 0.05–0.5)
EOSINOPHILS RELATIVE PERCENT: 1.4 % (ref 0–6)
GFR AFRICAN AMERICAN: 51
GFR NON-AFRICAN AMERICAN: 43 ML/MIN/1.73
GLUCOSE BLD-MCNC: 208 MG/DL (ref 74–99)
HCT VFR BLD CALC: 39.8 % (ref 34–48)
HEMOGLOBIN: 13.8 G/DL (ref 11.5–15.5)
IMMATURE GRANULOCYTES #: 0.02 E9/L
IMMATURE GRANULOCYTES %: 0.4 % (ref 0–5)
LYMPHOCYTES ABSOLUTE: 0.93 E9/L (ref 1.5–4)
LYMPHOCYTES RELATIVE PERCENT: 19 % (ref 20–42)
MCH RBC QN AUTO: 42.1 PG (ref 26–35)
MCHC RBC AUTO-ENTMCNC: 34.7 % (ref 32–34.5)
MCV RBC AUTO: 121.3 FL (ref 80–99.9)
MONOCYTES ABSOLUTE: 0.16 E9/L (ref 0.1–0.95)
MONOCYTES RELATIVE PERCENT: 3.3 % (ref 2–12)
NEUTROPHILS ABSOLUTE: 3.68 E9/L (ref 1.8–7.3)
NEUTROPHILS RELATIVE PERCENT: 75.1 % (ref 43–80)
OVALOCYTES: ABNORMAL
PDW BLD-RTO: 11.9 FL (ref 11.5–15)
PLATELET # BLD: 203 E9/L (ref 130–450)
PMV BLD AUTO: 10.9 FL (ref 7–12)
POIKILOCYTES: ABNORMAL
POLYCHROMASIA: ABNORMAL
POTASSIUM SERPL-SCNC: 4.6 MMOL/L (ref 3.5–5)
RBC # BLD: 3.28 E12/L (ref 3.5–5.5)
SODIUM BLD-SCNC: 134 MMOL/L (ref 132–146)
TOTAL PROTEIN: 6 G/DL (ref 6.4–8.3)
WBC # BLD: 4.9 E9/L (ref 4.5–11.5)

## 2021-10-27 ENCOUNTER — HOSPITAL ENCOUNTER (OUTPATIENT)
Dept: INFUSION THERAPY | Age: 86
Discharge: HOME | End: 2021-10-27
Payer: MEDICARE

## 2021-10-27 ENCOUNTER — OFFICE VISIT (OUTPATIENT)
Dept: ONCOLOGY | Age: 86
End: 2021-10-27
Payer: MEDICARE

## 2021-10-27 VITALS
HEART RATE: 72 BPM | TEMPERATURE: 99.1 F | DIASTOLIC BLOOD PRESSURE: 64 MMHG | HEIGHT: 63 IN | SYSTOLIC BLOOD PRESSURE: 146 MMHG | WEIGHT: 155 LBS | BODY MASS INDEX: 27.46 KG/M2 | OXYGEN SATURATION: 95 %

## 2021-10-27 DIAGNOSIS — D45 POLYCYTHEMIA VERA (HCC): Primary | ICD-10-CM

## 2021-10-27 PROCEDURE — 99212 OFFICE O/P EST SF 10 MIN: CPT

## 2021-10-27 RX ORDER — HYDROXYUREA 500 MG/1
500 CAPSULE ORAL DAILY
Qty: 90 CAPSULE | Refills: 0 | Status: SHIPPED
Start: 2021-10-27 | End: 2022-01-31 | Stop reason: SDUPTHER

## 2021-10-27 NOTE — PROGRESS NOTES
[facility]. [facility] Name: Yohan Garner  YOB: 1935  Date of evaluation: 10/27/2021  Primary Care Physician: Darleen Hdz DO  Reason for evaluation:   Chief Complaint   Patient presents with    Follow-up     polycthermia vera ([address]        Subjective: Here for  follow up. Tolerating Hydrea well. OBJECTIVE:  VITALS:  height is 5' 3\" (1.6 m) and weight is 155 lb (70.3 kg). Her temperature is 99.1 °F (37.3 °C). Her blood pressure is 146/64 (abnormal) and her pulse is 72. Her oxygen saturation is 95%. Physical Exam:  Performance Status: 0  Well developed, well nourished female  General: AAO to person, place, time, in no acute distress. Head and neck: PERRLA, EOMI . Sclera non icteric. Oropharynx: Clear. Neck: no JVD,  no adenopathy. Heart: Regular rate and regular rhythm. No murmur. Lungs: Clear to auscultation. Abdomen: Soft, non-tender;no masses, no organomegaly. Extremities: No edema,no cyanosis, no clubbing. Neurologic:Cranial nerves grossly intact. No focal motor or sensory deficits. Skin:  No rash. Medications  Prior to Admission medications    Medication Sig Start Date End Date Taking?  Authorizing Provider   lisinopril (PRINIVIL;ZESTRIL) 10 MG tablet TAKE 1 TABLET EVERY DAY 10/21/21  Yes Darleen Hdz DO   atorvastatin (LIPITOR) 40 MG tablet TAKE 1 TABLET EVERY DAY 10/15/21  Yes Darleen Hdz DO   alendronate (FOSAMAX) 70 MG tablet Take 1 tablet by mouth once a week 10/6/21  Yes Darleen Hdz DO   hydroxyurea (HYDREA) 500 MG chemo capsule Take 1 capsule by mouth daily 9/15/21  Yes Jeronimo Santos MD   hydroxyurea (HYDREA) 500 MG chemo capsule Take 1 capsule by mouth 2 times daily 9/7/21 11/6/21 Yes AMADA Echavarria CNP   ondansetron (ZOFRAN) 4 MG tablet Take 1 tablet by mouth every 12 hours as needed for Nausea or Vomiting 4/27/20  Yes Jeronimo Santos MD   Multiple Vitamins-Minerals (THERAPEUTIC MULTIVITAMIN-MINERALS) tablet Take 1 tablet by mouth daily   Yes Historical Provider, MD   aspirin 81 MG EC tablet Take 81 mg by mouth daily   Yes Historical Provider, MD   nitrofurantoin (MACRODANTIN) 100 MG capsule Take 100 mg by mouth nightly    Yes Historical Provider, MD   traMADol (ULTRAM) 50 MG tablet Take 50 mg by mouth every 6 hours as needed for Pain.    Yes Historical Provider, MD   calcium carbonate (OSCAL) 500 MG TABS tablet Take 500 mg by mouth daily Indications: plus 10 mcg D3 Patient is taking 3 tablets daily   Yes Historical Provider, MD   escitalopram (LEXAPRO) 20 MG tablet Take 20 mg by mouth daily   Yes Historical Provider, MD   pantoprazole (PROTONIX) 40 MG tablet Take 40 mg by mouth daily   Yes Historical Provider, MD   colestipol (COLESTID) 1 g tablet Take 2 g by mouth 2 times daily    Yes Historical Provider, MD    Scheduled Meds:  Continuous Infusions:  PRN Meds:.        Recent Laboratory Data-     Lab Results   Component Value Date    WBC 4.9 10/26/2021    HGB 13.8 10/26/2021    HCT 39.8 10/26/2021    .3 (H) 10/26/2021     10/26/2021    LYMPHOPCT 19.0 (L) 10/26/2021    RBC 3.28 (L) 10/26/2021    MCH 42.1 (H) 10/26/2021    MCHC 34.7 (H) 10/26/2021    RDW 11.9 10/26/2021    NEUTOPHILPCT 75.1 10/26/2021    MONOPCT 3.3 10/26/2021    BASOPCT 0.8 10/26/2021    NEUTROABS 3.68 10/26/2021    LYMPHSABS 0.93 (L) 10/26/2021    MONOSABS 0.16 10/26/2021    EOSABS 0.07 10/26/2021    BASOSABS 0.04 10/26/2021       Lab Results   Component Value Date     10/26/2021    K 4.6 10/26/2021     10/26/2021    CO2 22 10/26/2021    BUN 20 10/26/2021    CREATININE 1.2 (H) 10/26/2021    GLUCOSE 208 (H) 10/26/2021    CALCIUM 9.9 10/26/2021    PROT 6.0 (L) 10/26/2021    LABALBU 4.1 10/26/2021    BILITOT 0.5 10/26/2021    ALKPHOS 65 10/26/2021    AST 18 10/26/2021    ALT 15 10/26/2021    LABGLOM 43 10/26/2021    GFRAA 51 10/26/2021         QUALITATIVE JAK2 MUTATION:  POSITIVE.      BCR/ABL1 Interphase FISH:  NEGATIVE          Radiology-  US RIGHT BREAST:  8/24/2020  1. 2 tiny cystic lesions including simple cyst at the 3 o'clock position and    mildly complex cyst at the 6 o'clock patient vision.  Both of these measure 4    mm. 2. Otherwise, unremarkable right breast ultrasound and diagnostic mammogram.    Please note that a negative report should not delay biopsy if there is a    clinically suspicious palpable abnormality.  Unless otherwise clinically    indicated, follow-up mammography and ultrasound of the right breast in 6    months is suggested.         BIRADS:    BIRADS - CATEGORY 3         Findings are probably benign. A short interval follow-up is recommended in 6    months.         OVERALL ASSESSMENT - PROBABLY BENIGN. DIGITAL AILIN RIGHT MAMMOGRAM: 8/24/2020  No radiographic evidence of malignancy.  The asymmetric density seen on the    recent screening mammogram is not confirmed on the additional views. Subsequent ultrasound study only showed a couple of tiny cystic lesions with    no suspicious mass seen.  Unless otherwise clinically indicated, follow-up    mammography and ultrasound of the right breast in 6 months is suggested.         BIRADS:    BIRADS - CATEGORY 3         Findings are probably benign. A short interval follow-up is recommended in 6    months.         OVERALL ASSESSMENT - PROBABLY BENIGN. BILATERAL SCREENING MAMMOGRAM:  8/04/2020.  1. Approximately 1 cm asymmetry seen within the medial aspect of the right    breast 4.7 cm from the nipple.  The asymmetry is only seen on the CC view. Dedicated diagnostic mammogram of the right breast is recommended. 2. Stable mammogram of the left breast.  Annual screening mammography left    breast is recommended.         BIRADS:    BIRADS - CATEGORY 0         Additional imaging is recommended at this time.         OVERALL ASSESSMENT - INCOMPLETE:NEED ADDITIONAL IMAGING EVALUATION.       ASSESSMENT/PLAN :  Polycythemia with borderline neutrophilic leukocytosis.     She has no clinical suggestion of reactive compensatory secondary polycythemia with absence of prior history of smoking COPD or sleep apnea     Rule out primary myeloproliferative disorder such as P vera. Rule out CML doubt     To check CBC, blood smear review, baseline EPO level, qualitative JAK2 mutation as well as BCR ABL by FISH. We will follow    Her EPO level was low at 1. Qualitative Jose Guadalupe 2 mutation was positive  BCRABL by FISH was negative    She has polycythemia vera  Hemoglobin was elevated at 17.9 on 3/9/2020    . Hemoglobin was elevated at 17.7 on 3/30/2020 and she underwent a  therapeutic phlebotomy and 250 cc of blood was removed under medical supervision. She was initiated on hydroxyurea 500 mg daily. Hemoglobin was elevated at 18.0  on 4/24/2020 and she underwent a  therapeutic phlebotomy and 250 cc of blood was  removed under medical supervision. Her Hydrea was increased to 500 mg twice daily. She was given PRN Zofran for nausea    Hgb was 16 on 5/27/2020 and she underwent phlebotomy with a target hemoglobin of 15  She will be maintained on Hydrea 500 mg twice daily. Hgb was 11.7  on  7/28/2020  Hydrea dose was decreased to 500 mg daily    Hgb was 12.7  on  8/25/2020  Hgb was 14.3  on  9/22/2020. No phlebotomy warranted to continue Hydrea 500 mg daily      11/09/2020  Hgb was 15.9; Hct was 45.5  on 10/27/2020  She is to undergo a  therapeutic phlebotomy and 250 cc of blood will be  removed under medical supervision with a target hemoglobin of 15. Her Hydrea dose will be increased back to 500 mg twice daily      4/14/2021  CBC today reviewed and is in the normal range and no phlebotomy indicated. She is to continue on Hydrea 500 mg twice daily    9/15/2021  Tolerating Hydrea well.   CBC shows leukopenia with a hemoglobin of 11.8 elevated MCV and a platelet count of 594    To decrease Hydrea dose to 500 mg daily  To have follow-up CBC in 6 weeks    10/27/21  Tolerating Hydrea well. Her dose had been decreased to 500 mg daily. Her CBC today shows a WBC count of 4.9 with a hemoglobin of 13.8  and normal platelet count of 979. CBC and CMP are unremarkable  She has CKD stable stage III. To continue present dose of Hydrea    Darnell Latham M.D., F.A.C.P.   Electronically signed 10/27/2021 at 1:54 PM

## 2021-11-22 RX ORDER — NITROFURANTOIN MACROCRYSTALS 100 MG/1
CAPSULE ORAL
Qty: 90 CAPSULE | Status: CANCELLED | OUTPATIENT
Start: 2021-11-22

## 2021-11-23 RX ORDER — NITROFURANTOIN 25; 75 MG/1; MG/1
100 CAPSULE ORAL DAILY
Qty: 90 CAPSULE | Refills: 0 | Status: SHIPPED
Start: 2021-11-23 | End: 2022-02-08

## 2021-11-24 ENCOUNTER — TELEPHONE (OUTPATIENT)
Dept: PRIMARY CARE CLINIC | Age: 86
End: 2021-11-24

## 2021-11-24 NOTE — TELEPHONE ENCOUNTER
Pc to pt regarding labs. Infomed pt left voicemail Recheck labs 6 months.  Decrease Vit B12 to 500mg qd

## 2021-12-14 RX ORDER — ESCITALOPRAM OXALATE 20 MG/1
TABLET ORAL
Qty: 90 TABLET | Refills: 0 | Status: SHIPPED
Start: 2021-12-14 | End: 2022-03-10

## 2021-12-14 RX ORDER — PANTOPRAZOLE SODIUM 40 MG/1
TABLET, DELAYED RELEASE ORAL
Qty: 90 TABLET | Refills: 0 | Status: SHIPPED
Start: 2021-12-14 | End: 2022-03-10

## 2021-12-20 RX ORDER — LISINOPRIL 10 MG/1
TABLET ORAL
Qty: 90 TABLET | Refills: 0 | Status: SHIPPED
Start: 2021-12-20 | End: 2022-03-10

## 2022-01-11 RX ORDER — ALENDRONATE SODIUM 70 MG/1
70 TABLET ORAL WEEKLY
Qty: 12 TABLET | Refills: 0 | Status: SHIPPED
Start: 2022-01-11 | End: 2022-04-08 | Stop reason: SDUPTHER

## 2022-01-28 DIAGNOSIS — D45 POLYCYTHEMIA VERA (HCC): ICD-10-CM

## 2022-01-28 LAB
ALBUMIN SERPL-MCNC: 4.1 G/DL (ref 3.5–5.2)
ALP BLD-CCNC: 63 U/L (ref 35–104)
ALT SERPL-CCNC: 18 U/L (ref 0–32)
ANION GAP SERPL CALCULATED.3IONS-SCNC: 15 MMOL/L (ref 7–16)
ANISOCYTOSIS: ABNORMAL
AST SERPL-CCNC: 23 U/L (ref 0–31)
BASOPHILS ABSOLUTE: 0.07 E9/L (ref 0–0.2)
BASOPHILS RELATIVE PERCENT: 1.1 % (ref 0–2)
BILIRUB SERPL-MCNC: 0.5 MG/DL (ref 0–1.2)
BUN BLDV-MCNC: 20 MG/DL (ref 6–23)
CALCIUM SERPL-MCNC: 10 MG/DL (ref 8.6–10.2)
CHLORIDE BLD-SCNC: 99 MMOL/L (ref 98–107)
CO2: 24 MMOL/L (ref 22–29)
CREAT SERPL-MCNC: 1.1 MG/DL (ref 0.5–1)
EOSINOPHILS ABSOLUTE: 0.1 E9/L (ref 0.05–0.5)
EOSINOPHILS RELATIVE PERCENT: 1.5 % (ref 0–6)
GFR AFRICAN AMERICAN: 57
GFR NON-AFRICAN AMERICAN: 47 ML/MIN/1.73
GLUCOSE BLD-MCNC: 140 MG/DL (ref 74–99)
HCT VFR BLD CALC: 44.1 % (ref 34–48)
HEMOGLOBIN: 14.8 G/DL (ref 11.5–15.5)
IMMATURE GRANULOCYTES #: 0.03 E9/L
IMMATURE GRANULOCYTES %: 0.5 % (ref 0–5)
LYMPHOCYTES ABSOLUTE: 0.99 E9/L (ref 1.5–4)
LYMPHOCYTES RELATIVE PERCENT: 15.3 % (ref 20–42)
MCH RBC QN AUTO: 38.1 PG (ref 26–35)
MCHC RBC AUTO-ENTMCNC: 33.6 % (ref 32–34.5)
MCV RBC AUTO: 113.7 FL (ref 80–99.9)
MONOCYTES ABSOLUTE: 0.25 E9/L (ref 0.1–0.95)
MONOCYTES RELATIVE PERCENT: 3.9 % (ref 2–12)
NEUTROPHILS ABSOLUTE: 5.03 E9/L (ref 1.8–7.3)
NEUTROPHILS RELATIVE PERCENT: 77.7 % (ref 43–80)
OVALOCYTES: ABNORMAL
PDW BLD-RTO: 14.6 FL (ref 11.5–15)
PLATELET # BLD: 242 E9/L (ref 130–450)
PMV BLD AUTO: 10.7 FL (ref 7–12)
POIKILOCYTES: ABNORMAL
POLYCHROMASIA: ABNORMAL
POTASSIUM SERPL-SCNC: 4.5 MMOL/L (ref 3.5–5)
RBC # BLD: 3.88 E12/L (ref 3.5–5.5)
SODIUM BLD-SCNC: 138 MMOL/L (ref 132–146)
TOTAL PROTEIN: 6.9 G/DL (ref 6.4–8.3)
VACUOLATED NEUTROPHILS: ABNORMAL
WBC # BLD: 6.5 E9/L (ref 4.5–11.5)

## 2022-01-31 ENCOUNTER — OFFICE VISIT (OUTPATIENT)
Dept: ONCOLOGY | Age: 87
End: 2022-01-31
Payer: MEDICARE

## 2022-01-31 VITALS
OXYGEN SATURATION: 95 % | WEIGHT: 154.6 LBS | HEART RATE: 81 BPM | DIASTOLIC BLOOD PRESSURE: 74 MMHG | SYSTOLIC BLOOD PRESSURE: 130 MMHG | BODY MASS INDEX: 26.4 KG/M2 | TEMPERATURE: 97.1 F | HEIGHT: 64 IN

## 2022-01-31 DIAGNOSIS — D45 POLYCYTHEMIA VERA (HCC): Primary | ICD-10-CM

## 2022-01-31 PROCEDURE — 99212 OFFICE O/P EST SF 10 MIN: CPT

## 2022-01-31 RX ORDER — HYDROXYUREA 500 MG/1
500 CAPSULE ORAL DAILY
Qty: 90 CAPSULE | Refills: 0 | Status: SHIPPED
Start: 2022-01-31 | End: 2022-05-02 | Stop reason: SDUPTHER

## 2022-01-31 NOTE — PROGRESS NOTES
[address] [facility]. Nic Romero, [place]        Pt Name: Ilana Sanchez  YOB: 1935  Date of evaluation: 1/31/2022  Primary Care Physician: Myrna Stover DO  Reason for evaluation:   Chief Complaint   Patient presents with    Coagulation Disorder     polycythemia        Subjective: Here for  follow up. Tolerating Hydrea well. OBJECTIVE:  VITALS:  height is 5' 4\" (1.626 m) and weight is 154 lb 9.6 oz (70.1 kg). Her temperature is 97.1 °F (36.2 °C). Her blood pressure is 130/74 and her pulse is 81. Her oxygen saturation is 95%. Physical Exam:  Performance Status: 0  Well developed, well nourished female  General: AAO to person, place, time, in no acute distress. Head and neck: PERRLA, EOMI . Sclera non icteric. Oropharynx: Clear. Neck: no JVD,  no adenopathy. Heart: Regular rate and regular rhythm. No murmur. Lungs: Clear to auscultation. Abdomen: Soft, non-tender;no masses, no organomegaly. Extremities: No edema,no cyanosis, no clubbing. Neurologic:Cranial nerves grossly intact. No focal motor or sensory deficits. Skin:  No rash. Medications  Prior to Admission medications    Medication Sig Start Date End Date Taking?  Authorizing Provider   alendronate (FOSAMAX) 70 MG tablet Take 1 tablet by mouth once a week 1/11/22   Myrna Stover DO   lisinopril (PRINIVIL;ZESTRIL) 10 MG tablet TAKE 1 TABLET EVERY DAY 12/20/21   Myrna Stover DO   escitalopram (LEXAPRO) 20 MG tablet TAKE 1 TABLET EVERY DAY 12/14/21   Myrna Stover DO   pantoprazole (PROTONIX) 40 MG tablet TAKE 1 TABLET EVERY DAY 12/14/21   Myrna Stover DO   hydroxyurea (HYDREA) 500 MG chemo capsule Take 1 capsule by mouth daily 10/27/21   Diana Ha MD   atorvastatin (LIPITOR) 40 MG tablet TAKE 1 TABLET EVERY DAY 10/15/21   Myrna Stover DO   Multiple Vitamins-Minerals (THERAPEUTIC MULTIVITAMIN-MINERALS) tablet Take 1 tablet by mouth daily    Historical Provider, MD   aspirin 81 MG EC tablet Take 81 mg by mouth daily    Historical Provider, MD   traMADol (ULTRAM) 50 MG tablet Take 50 mg by mouth every 6 hours as needed for Pain. Historical Provider, MD   calcium carbonate (OSCAL) 500 MG TABS tablet Take 500 mg by mouth daily Indications: plus 10 mcg D3 Patient is taking 3 tablets daily    Historical Provider, MD   colestipol (COLESTID) 1 g tablet Take 2 g by mouth 2 times daily     Historical Provider, MD    Scheduled Meds:  Continuous Infusions:  PRN Meds:.        Recent Laboratory Data-     Lab Results   Component Value Date    WBC 6.5 01/28/2022    HGB 14.8 01/28/2022    HCT 44.1 01/28/2022    .7 (H) 01/28/2022     01/28/2022    LYMPHOPCT 15.3 (L) 01/28/2022    RBC 3.88 01/28/2022    MCH 38.1 (H) 01/28/2022    MCHC 33.6 01/28/2022    RDW 14.6 01/28/2022    NEUTOPHILPCT 77.7 01/28/2022    MONOPCT 3.9 01/28/2022    BASOPCT 1.1 01/28/2022    NEUTROABS 5.03 01/28/2022    LYMPHSABS 0.99 (L) 01/28/2022    MONOSABS 0.25 01/28/2022    EOSABS 0.10 01/28/2022    BASOSABS 0.07 01/28/2022       Lab Results   Component Value Date     01/28/2022    K 4.5 01/28/2022    CL 99 01/28/2022    CO2 24 01/28/2022    BUN 20 01/28/2022    CREATININE 1.1 (H) 01/28/2022    GLUCOSE 140 (H) 01/28/2022    CALCIUM 10.0 01/28/2022    PROT 6.9 01/28/2022    LABALBU 4.1 01/28/2022    BILITOT 0.5 01/28/2022    ALKPHOS 63 01/28/2022    AST 23 01/28/2022    ALT 18 01/28/2022    LABGLOM 47 01/28/2022    GFRAA 57 01/28/2022         QUALITATIVE JAK2 MUTATION:  POSITIVE.      BCR/ABL1 Interphase FISH:  NEGATIVE          Radiology-  US RIGHT BREAST:  8/24/2020  1. 2 tiny cystic lesions including simple cyst at the 3 o'clock position and    mildly complex cyst at the 6 o'clock patient vision.  Both of these measure 4    mm.     2. Otherwise, unremarkable right breast ultrasound and diagnostic mammogram.    Please note that a negative report should not delay biopsy if there is a    clinically suspicious palpable abnormality.  Unless otherwise clinically    indicated, follow-up mammography and ultrasound of the right breast in 6    months is suggested.         BIRADS:    BIRADS - CATEGORY 3         Findings are probably benign. A short interval follow-up is recommended in 6    months.         OVERALL ASSESSMENT - PROBABLY BENIGN. DIGITAL AILIN RIGHT MAMMOGRAM: 8/24/2020  No radiographic evidence of malignancy.  The asymmetric density seen on the    recent screening mammogram is not confirmed on the additional views. Subsequent ultrasound study only showed a couple of tiny cystic lesions with    no suspicious mass seen.  Unless otherwise clinically indicated, follow-up    mammography and ultrasound of the right breast in 6 months is suggested.         BIRADS:    BIRADS - CATEGORY 3         Findings are probably benign. A short interval follow-up is recommended in 6    months.         OVERALL ASSESSMENT - PROBABLY BENIGN. BILATERAL SCREENING MAMMOGRAM:  8/04/2020.  1. Approximately 1 cm asymmetry seen within the medial aspect of the right    breast 4.7 cm from the nipple.  The asymmetry is only seen on the CC view. Dedicated diagnostic mammogram of the right breast is recommended. 2. Stable mammogram of the left breast.  Annual screening mammography left    breast is recommended.         BIRADS:    BIRADS - CATEGORY 0         Additional imaging is recommended at this time.         OVERALL ASSESSMENT - INCOMPLETE:NEED ADDITIONAL IMAGING EVALUATION.                 ASSESSMENT/PLAN :  Polycythemia with borderline neutrophilic leukocytosis.     She has no clinical suggestion of reactive compensatory secondary polycythemia with absence of prior history of smoking COPD or sleep apnea     Rule out primary myeloproliferative disorder such as P vera. Rule out CML doubt     To check CBC, blood smear review, baseline EPO level, qualitative JAK2 mutation as well as BCR ABL by FISH.   We will follow    Her EPO level was low at 1. Qualitative Jose Guadalupe 2 mutation was positive  BCR-ABL by FISH was negative    She has polycythemia vera  Hemoglobin was elevated at 17.9 on 3/9/2020    . Hemoglobin was elevated at 17.7 on 3/30/2020 and she underwent a  therapeutic phlebotomy and 250 cc of blood was removed under medical supervision. She was initiated on hydroxyurea 500 mg daily. Hemoglobin was elevated at 18.0  on 4/24/2020 and she underwent a  therapeutic phlebotomy and 250 cc of blood was  removed under medical supervision. Her Hydrea was increased to 500 mg twice daily. She was given PRN Zofran for nausea    Hgb was 16 on 5/27/2020 and she underwent phlebotomy with a target hemoglobin of 15  She will be maintained on Hydrea 500 mg twice daily. Hgb was 11.7  on  7/28/2020  Hydrea dose was decreased to 500 mg daily    Hgb was 12.7  on  8/25/2020  Hgb was 14.3  on  9/22/2020. No phlebotomy warranted to continue Hydrea 500 mg daily      11/09/2020  Hgb was 15.9; Hct was 45.5  on 10/27/2020  She is to undergo a  therapeutic phlebotomy and 250 cc of blood will be  removed under medical supervision with a target hemoglobin of 15. Her Hydrea dose will be increased back to 500 mg twice daily      4/14/2021  CBC today reviewed and is in the normal range and no phlebotomy indicated. She is to continue on Hydrea 500 mg twice daily    9/15/2021  Tolerating Hydrea well. CBC shows leukopenia with a hemoglobin of 11.8 elevated MCV and a platelet count of 334    To decrease Hydrea dose to 500 mg daily  To have follow-up CBC in 6 weeks    10/27/21  Tolerating Hydrea well. Her dose had been decreased to 500 mg daily. Her CBC today shows a WBC count of 4.9 with a hemoglobin of 13.8  and normal platelet count of 512. CBC and CMP are unremarkable  She has CKD stable stage III.   To continue present dose of Hydrea    1/31/2022  Continues on Hydrea 500 mg daily and tolerating it well  Her CBC today shows a hemoglobin of 14.8 with an MCV of 113 with normal WBC count of 6.5 and normal platelet count of 237  Renal function slightly improved. Patient with polycythemia vera. No phlebotomy warranted today. To continue Hydrea 500 mg p.o. daily        Amber Mena. Thierry Mora M.D., F.A.C.P.   Electronically signed 1/31/2022 at 3:08 PM

## 2022-03-01 DIAGNOSIS — E78.5 HYPERLIPIDEMIA, UNSPECIFIED HYPERLIPIDEMIA TYPE: ICD-10-CM

## 2022-03-01 DIAGNOSIS — E07.9 THYROID DISEASE: ICD-10-CM

## 2022-03-01 DIAGNOSIS — I10 PRIMARY HYPERTENSION: Primary | ICD-10-CM

## 2022-03-01 PROBLEM — B96.5 PSEUDOMONAS URINARY TRACT INFECTION: Status: RESOLVED | Noted: 2019-11-08 | Resolved: 2022-03-01

## 2022-03-01 PROBLEM — N39.0 PSEUDOMONAS URINARY TRACT INFECTION: Status: RESOLVED | Noted: 2019-11-08 | Resolved: 2022-03-01

## 2022-03-01 RX ORDER — ATORVASTATIN CALCIUM 40 MG/1
TABLET, FILM COATED ORAL
Qty: 90 TABLET | Refills: 0 | Status: SHIPPED
Start: 2022-03-01 | End: 2022-05-09

## 2022-03-10 ENCOUNTER — TELEPHONE (OUTPATIENT)
Dept: PRIMARY CARE CLINIC | Age: 87
End: 2022-03-10

## 2022-03-10 DIAGNOSIS — E53.8 B12 DEFICIENCY: Primary | ICD-10-CM

## 2022-03-10 RX ORDER — PANTOPRAZOLE SODIUM 40 MG/1
TABLET, DELAYED RELEASE ORAL
Qty: 90 TABLET | Refills: 0 | Status: SHIPPED
Start: 2022-03-10 | End: 2022-05-16

## 2022-03-10 RX ORDER — ESCITALOPRAM OXALATE 20 MG/1
TABLET ORAL
Qty: 90 TABLET | Refills: 0 | Status: SHIPPED
Start: 2022-03-10 | End: 2022-05-16

## 2022-03-10 RX ORDER — LISINOPRIL 10 MG/1
TABLET ORAL
Qty: 90 TABLET | Refills: 0 | Status: SHIPPED
Start: 2022-03-10 | End: 2022-05-16

## 2022-03-14 DIAGNOSIS — E07.9 THYROID DISEASE: ICD-10-CM

## 2022-03-14 DIAGNOSIS — I10 PRIMARY HYPERTENSION: ICD-10-CM

## 2022-03-14 DIAGNOSIS — E53.8 B12 DEFICIENCY: ICD-10-CM

## 2022-03-14 DIAGNOSIS — E78.5 HYPERLIPIDEMIA, UNSPECIFIED HYPERLIPIDEMIA TYPE: ICD-10-CM

## 2022-03-14 LAB
ALBUMIN SERPL-MCNC: 4.2 G/DL (ref 3.5–5.2)
ALP BLD-CCNC: 66 U/L (ref 35–104)
ALT SERPL-CCNC: 15 U/L (ref 0–32)
ANION GAP SERPL CALCULATED.3IONS-SCNC: 18 MMOL/L (ref 7–16)
AST SERPL-CCNC: 23 U/L (ref 0–31)
BILIRUB SERPL-MCNC: 0.6 MG/DL (ref 0–1.2)
BUN BLDV-MCNC: 18 MG/DL (ref 6–23)
CALCIUM SERPL-MCNC: 10.1 MG/DL (ref 8.6–10.2)
CHLORIDE BLD-SCNC: 104 MMOL/L (ref 98–107)
CHOLESTEROL, TOTAL: 122 MG/DL (ref 0–199)
CO2: 18 MMOL/L (ref 22–29)
CREAT SERPL-MCNC: 1.1 MG/DL (ref 0.5–1)
GFR AFRICAN AMERICAN: 57
GFR NON-AFRICAN AMERICAN: 47 ML/MIN/1.73
GLUCOSE FASTING: 136 MG/DL (ref 74–99)
HDLC SERPL-MCNC: 54 MG/DL
LDL CHOLESTEROL CALCULATED: 45 MG/DL (ref 0–99)
POTASSIUM SERPL-SCNC: 4.9 MMOL/L (ref 3.5–5)
SODIUM BLD-SCNC: 140 MMOL/L (ref 132–146)
TOTAL PROTEIN: 7 G/DL (ref 6.4–8.3)
TRIGL SERPL-MCNC: 113 MG/DL (ref 0–149)
TSH SERPL DL<=0.05 MIU/L-ACNC: 4.33 UIU/ML (ref 0.27–4.2)
VITAMIN B-12: 1472 PG/ML (ref 211–946)
VLDLC SERPL CALC-MCNC: 23 MG/DL

## 2022-04-08 RX ORDER — ALENDRONATE SODIUM 70 MG/1
70 TABLET ORAL WEEKLY
Qty: 12 TABLET | Refills: 0 | Status: SHIPPED
Start: 2022-04-08 | End: 2022-07-07

## 2022-04-12 ENCOUNTER — HOSPITAL ENCOUNTER (EMERGENCY)
Age: 87
Discharge: HOME | End: 2022-04-12
Attending: STUDENT IN AN ORGANIZED HEALTH CARE EDUCATION/TRAINING PROGRAM
Payer: MEDICARE

## 2022-04-12 ENCOUNTER — APPOINTMENT (OUTPATIENT)
Dept: CT IMAGING | Age: 87
End: 2022-04-12
Payer: MEDICARE

## 2022-04-12 ENCOUNTER — APPOINTMENT (OUTPATIENT)
Dept: GENERAL RADIOLOGY | Age: 87
End: 2022-04-12
Payer: MEDICARE

## 2022-04-12 VITALS
RESPIRATION RATE: 19 BRPM | HEIGHT: 64 IN | WEIGHT: 154 LBS | DIASTOLIC BLOOD PRESSURE: 90 MMHG | OXYGEN SATURATION: 98 % | TEMPERATURE: 98 F | SYSTOLIC BLOOD PRESSURE: 165 MMHG | BODY MASS INDEX: 26.29 KG/M2 | HEART RATE: 71 BPM

## 2022-04-12 DIAGNOSIS — M89.9 LYTIC LESION OF BONE ON X-RAY: ICD-10-CM

## 2022-04-12 DIAGNOSIS — E86.0 DEHYDRATION: ICD-10-CM

## 2022-04-12 DIAGNOSIS — S40.011A TRAUMATIC HEMATOMA OF RIGHT SHOULDER, INITIAL ENCOUNTER: ICD-10-CM

## 2022-04-12 DIAGNOSIS — E04.1 THYROID NODULE: ICD-10-CM

## 2022-04-12 DIAGNOSIS — N39.0 URINARY TRACT INFECTION WITHOUT HEMATURIA, SITE UNSPECIFIED: Primary | ICD-10-CM

## 2022-04-12 DIAGNOSIS — S09.92XA INJURY OF NOSE, INITIAL ENCOUNTER: ICD-10-CM

## 2022-04-12 DIAGNOSIS — R42 LIGHTHEADEDNESS: ICD-10-CM
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
## 2022-04-12 LAB
ALBUMIN SERPL-MCNC: 3.9 G/DL (ref 3.5–5.2)
ALP BLD-CCNC: 77 U/L (ref 35–104)
ALT SERPL-CCNC: 12 U/L (ref 0–32)
ANION GAP SERPL CALCULATED.3IONS-SCNC: 15 MMOL/L (ref 7–16)
AST SERPL-CCNC: 24 U/L (ref 0–31)
BACTERIA: ABNORMAL /HPF
BASOPHILS ABSOLUTE: 0.08 E9/L (ref 0–0.2)
BASOPHILS RELATIVE PERCENT: 0.9 % (ref 0–2)
BILIRUB SERPL-MCNC: 0.9 MG/DL (ref 0–1.2)
BILIRUBIN URINE: NEGATIVE
BLOOD, URINE: NEGATIVE
BUN BLDV-MCNC: 21 MG/DL (ref 6–23)
CALCIUM SERPL-MCNC: 10 MG/DL (ref 8.6–10.2)
CHLORIDE BLD-SCNC: 99 MMOL/L (ref 98–107)
CLARITY: ABNORMAL
CO2: 20 MMOL/L (ref 22–29)
COLOR: YELLOW
CREAT SERPL-MCNC: 1.3 MG/DL (ref 0.5–1)
EKG ATRIAL RATE: 87 BPM
EKG P AXIS: 66 DEGREES
EKG P-R INTERVAL: 142 MS
EKG Q-T INTERVAL: 404 MS
EKG QRS DURATION: 80 MS
EKG QTC CALCULATION (BAZETT): 486 MS
EKG R AXIS: 25 DEGREES
EKG T AXIS: 62 DEGREES
EKG VENTRICULAR RATE: 87 BPM
EOSINOPHILS ABSOLUTE: 0 E9/L (ref 0.05–0.5)
EOSINOPHILS RELATIVE PERCENT: 0.1 % (ref 0–6)
EPITHELIAL CELLS, UA: ABNORMAL /HPF
GFR AFRICAN AMERICAN: 47
GFR NON-AFRICAN AMERICAN: 39 ML/MIN/1.73
GLUCOSE BLD-MCNC: 229 MG/DL (ref 74–99)
GLUCOSE URINE: NEGATIVE MG/DL
HCT VFR BLD CALC: 49.1 % (ref 34–48)
HEMOGLOBIN: 16.8 G/DL (ref 11.5–15.5)
KETONES, URINE: 15 MG/DL
LACTIC ACID: 2.1 MMOL/L (ref 0.5–2.2)
LEUKOCYTE ESTERASE, URINE: ABNORMAL
LYMPHOCYTES ABSOLUTE: 0.17 E9/L (ref 1.5–4)
LYMPHOCYTES RELATIVE PERCENT: 1.7 % (ref 20–42)
MCH RBC QN AUTO: 37.6 PG (ref 26–35)
MCHC RBC AUTO-ENTMCNC: 34.2 % (ref 32–34.5)
MCV RBC AUTO: 109.8 FL (ref 80–99.9)
MONOCYTES ABSOLUTE: 0.34 E9/L (ref 0.1–0.95)
MONOCYTES RELATIVE PERCENT: 3.5 % (ref 2–12)
NEUTROPHILS ABSOLUTE: 7.9 E9/L (ref 1.8–7.3)
NEUTROPHILS RELATIVE PERCENT: 93.9 % (ref 43–80)
NITRITE, URINE: POSITIVE
PDW BLD-RTO: 13 FL (ref 11.5–15)
PH UA: 5 (ref 5–9)
PLATELET # BLD: 240 E9/L (ref 130–450)
PMV BLD AUTO: 10.5 FL (ref 7–12)
POTASSIUM REFLEX MAGNESIUM: 4.5 MMOL/L (ref 3.5–5)
PROTEIN UA: NEGATIVE MG/DL
RBC # BLD: 4.47 E12/L (ref 3.5–5.5)
RBC UA: ABNORMAL /HPF (ref 0–2)
SARS-COV-2, NAAT: NOT DETECTED
SODIUM BLD-SCNC: 134 MMOL/L (ref 132–146)
SPECIFIC GRAVITY UA: 1.02 (ref 1–1.03)
TOTAL PROTEIN: 7.1 G/DL (ref 6.4–8.3)
TROPONIN, HIGH SENSITIVITY: 13 NG/L (ref 0–9)
TROPONIN, HIGH SENSITIVITY: <6 NG/L (ref 0–9)
UROBILINOGEN, URINE: 0.2 E.U./DL
WBC # BLD: 8.4 E9/L (ref 4.5–11.5)
WBC UA: >20 /HPF (ref 0–5)

## 2022-04-12 PROCEDURE — 36415 COLL VENOUS BLD VENIPUNCTURE: CPT

## 2022-04-12 PROCEDURE — 71046 X-RAY EXAM CHEST 2 VIEWS: CPT

## 2022-04-12 PROCEDURE — 99284 EMERGENCY DEPT VISIT MOD MDM: CPT

## 2022-04-12 PROCEDURE — 87635 SARS-COV-2 COVID-19 AMP PRB: CPT

## 2022-04-12 PROCEDURE — 70450 CT HEAD/BRAIN W/O DYE: CPT

## 2022-04-12 PROCEDURE — 6360000002 HC RX W HCPCS: Performed by: STUDENT IN AN ORGANIZED HEALTH CARE EDUCATION/TRAINING PROGRAM

## 2022-04-12 PROCEDURE — 96374 THER/PROPH/DIAG INJ IV PUSH: CPT

## 2022-04-12 PROCEDURE — 93010 ELECTROCARDIOGRAM REPORT: CPT | Performed by: INTERNAL MEDICINE

## 2022-04-12 PROCEDURE — 80053 COMPREHEN METABOLIC PANEL: CPT

## 2022-04-12 PROCEDURE — 81001 URINALYSIS AUTO W/SCOPE: CPT

## 2022-04-12 PROCEDURE — 84484 ASSAY OF TROPONIN QUANT: CPT

## 2022-04-12 PROCEDURE — 72125 CT NECK SPINE W/O DYE: CPT

## 2022-04-12 PROCEDURE — 93005 ELECTROCARDIOGRAM TRACING: CPT | Performed by: EMERGENCY MEDICINE

## 2022-04-12 PROCEDURE — 72170 X-RAY EXAM OF PELVIS: CPT

## 2022-04-12 PROCEDURE — 73060 X-RAY EXAM OF HUMERUS: CPT

## 2022-04-12 PROCEDURE — 73620 X-RAY EXAM OF FOOT: CPT

## 2022-04-12 PROCEDURE — 87186 SC STD MICRODIL/AGAR DIL: CPT

## 2022-04-12 PROCEDURE — 6360000002 HC RX W HCPCS: Performed by: EMERGENCY MEDICINE

## 2022-04-12 PROCEDURE — 87088 URINE BACTERIA CULTURE: CPT

## 2022-04-12 PROCEDURE — 2580000003 HC RX 258: Performed by: EMERGENCY MEDICINE

## 2022-04-12 PROCEDURE — 83605 ASSAY OF LACTIC ACID: CPT

## 2022-04-12 PROCEDURE — 2580000003 HC RX 258: Performed by: STUDENT IN AN ORGANIZED HEALTH CARE EDUCATION/TRAINING PROGRAM

## 2022-04-12 PROCEDURE — 96375 TX/PRO/DX INJ NEW DRUG ADDON: CPT

## 2022-04-12 PROCEDURE — 85025 COMPLETE CBC W/AUTO DIFF WBC: CPT

## 2022-04-12 PROCEDURE — 70486 CT MAXILLOFACIAL W/O DYE: CPT

## 2022-04-12 PROCEDURE — 87077 CULTURE AEROBIC IDENTIFY: CPT

## 2022-04-12 PROCEDURE — 96361 HYDRATE IV INFUSION ADD-ON: CPT

## 2022-04-12 RX ORDER — 0.9 % SODIUM CHLORIDE 0.9 %
1000 INTRAVENOUS SOLUTION INTRAVENOUS ONCE
Status: COMPLETED | OUTPATIENT
Start: 2022-04-12 | End: 2022-04-12

## 2022-04-12 RX ORDER — CEFDINIR 300 MG/1
300 CAPSULE ORAL 2 TIMES DAILY
Qty: 14 CAPSULE | Refills: 0 | Status: SHIPPED | OUTPATIENT
Start: 2022-04-12 | End: 2022-04-19

## 2022-04-12 RX ORDER — DIAZEPAM 5 MG/ML
5 INJECTION, SOLUTION INTRAMUSCULAR; INTRAVENOUS ONCE
Status: COMPLETED | OUTPATIENT
Start: 2022-04-12 | End: 2022-04-12

## 2022-04-12 RX ADMIN — CEFTRIAXONE SODIUM 1000 MG: 1 INJECTION, POWDER, FOR SOLUTION INTRAMUSCULAR; INTRAVENOUS at 11:56

## 2022-04-12 RX ADMIN — SODIUM CHLORIDE 1000 ML: 9 INJECTION, SOLUTION INTRAVENOUS at 09:05

## 2022-04-12 RX ADMIN — DIAZEPAM 5 MG: 10 INJECTION, SOLUTION INTRAMUSCULAR; INTRAVENOUS at 09:05

## 2022-04-12 ASSESSMENT — PAIN DESCRIPTION - LOCATION: LOCATION: NOSE

## 2022-04-12 ASSESSMENT — PAIN DESCRIPTION - PAIN TYPE: TYPE: ACUTE PAIN

## 2022-04-12 ASSESSMENT — PAIN DESCRIPTION - FREQUENCY: FREQUENCY: CONTINUOUS

## 2022-04-12 NOTE — ED PROVIDER NOTES
Chief Complaint   Patient presents with   Blase Liming Fall     out of bed, denies thinners    Facial Injury     hit bridge of nose during fall. Denies LOC    Nausea       HPI  Toño Mahoney is a 80 y.o. female who presents with new onset dizziness, nausea and vomiting that began following a fall out of bed overnight. The complaints are acute, moderate in severity, worsened by nothing and improved by nothing. The patient states that she was resting comfortably in bed when she woke up and felt nauseous. She states that she reached over to her nightstand for some water and lost her balance. She states that she then fell out of bed. She says that the first time this happened she fell, got back up and got back in bed without any issues. She states that she was not dizzy prior to or after the fall and did not hit her head nor lose consciousness. A short time later, she states that she felt nauseous again and that when she reached for the water she again fell out of bed. This time she states she hit her face on a carpeted floor. She notes that her nose did start bleeding briefly. She states she had difficulty getting up at that time, was able to get herself back into bed. She states that she was feeling intermittently dizzy and nauseous. She notes that she did throw up several times. She says that the emesis was NBNB. She also says that she has had some associated dry heaving. She states that when the nausea and dizziness were persistent this morning she decided to come to the ER to be evaluated. Of note, the patient states that she has had significant issues with nausea and vomiting when she has had urinary tract infections in the past.  She states that she is typically asymptomatic otherwise.  The patient denies recent fever, chills, fatigue, HA, paresthesias, generalized weakness, confusion, forgetfulness, vision changes, eye redness, congestion, rhinorrhea, sore throat, neck pain, C/T/L pain, chest pain, noted on auscultation, normal S1/S2; UE and LE distal pulses intact b/l +2/4 with no cyanosis noted; no LE edema noted b/l; capillary refill < 2 seconds  ABD: Soft, non-tender, non-distended, no organomegaly, hernias or masses noted  /Rectal: no suprapubic tenderness; remainder of exam deferred  MSK: RUE with a hematoma on the anterior aspect of the shoulder, mild TTP noted in the area; a small amount of bruising was noted on the anterior aspect of the right foot; normal strength throughout  Skin: Skin warm and dry without notable rash, erythema or lesion; normal turgor  Neuro: A&O x3; CN II-XII appear grossly intact; no focal deficits appreciated; pt thoughtful in discussion and able to answer all questions without issue; patient with slow, but normal gate  Psych: normal attention with no obvious AVH, normal mood, normal affect    ---------------------------------- PAST HISTORY ---------------------------------------------  Past Medical History:  has a past medical history of Cerebral artery occlusion with cerebral infarction ([facility] [address] Depression, History of blood transfusion, Hyperlipidemia, Hypertension, OAB (overactive bladder), Polycythemia, Polycythemia vera ([facility] [address] Pseudomonas urinary tract infection, Thyroid disease, UTI (urinary tract infection), and Zoster. Past Surgical History:  has a past surgical history that includes Cholecystectomy; parathyroidectomy; Thyroidectomy, partial; and Tubal ligation. Social History:  reports that she has never smoked. She has never used smokeless tobacco. She reports previous alcohol use. She reports that she does not use drugs. Family History: family history includes Brain Cancer in her daughter; Heart Disease in her mother; Other in her father. Home Meds: Not in a hospital admission. The patients home medications have been reviewed. Allergies: Patient has no known allergies.     ------------------------- NURSING NOTES AND VITALS REVIEWED ---------------------------  Date / Time Roomed:  4/12/2022  8:02 AM  ED Bed Assignment:  12/12    The nursing notes within the ED encounter and vital signs as below have been reviewed. BP (!) 165/90   Pulse 71   Temp 98 °F (36.7 °C) (Oral)   Resp 19   Ht 5' 4\" (1.626 m)   Wt 154 lb (69.9 kg)   SpO2 98%   BMI 26.43 kg/m²   -------------------------------------------------- RESULTS / INTERVENTIONS -------------------------------------------------  All laboratory and radiology tests have been reviewed by this physician. LABS:  Results for orders placed or performed during the hospital encounter of 04/12/22   COVID-19, Rapid    Specimen: Nasopharyngeal Swab   Result Value Ref Range    SARS-CoV-2, NAAT Not Detected Not Detected   Culture, Urine    Specimen: Urine, clean catch   Result Value Ref Range    Organism Klebsiella pneumoniae ssp pneumoniae (A)     Urine Culture, Routine >100,000 CFU/ml        Susceptibility    Klebsiella pneumoniae ssp pneumoniae - BACTERIAL SUSCEPTIBILITY PANEL BY CELI     amoxicillin-clavulanate <=^2 Sensitive mcg/mL     ceFAZolin <=^4 Sensitive mcg/mL     cefepime <=^0.12 Sensitive mcg/mL     cefotaxime <=^0.25 Sensitive mcg/mL     cefOXitin ^16 Intermediate mcg/mL     cefTAZidime-avibactam ^0. 25 Sensitive mcg/mL     gentamicin <=^1 Sensitive mcg/mL     levofloxacin ^0. 25 Sensitive mcg/mL     meropenem <=^0.25 Sensitive mcg/mL     nitrofurantoin >=^512 Resistant mcg/mL     piperacillin-tazobactam <=^4 Sensitive mcg/mL     trimethoprim-sulfamethoxazole >=^320 Resistant mcg/mL   CBC with Auto Differential   Result Value Ref Range    WBC 8.4 4.5 - 11.5 E9/L    RBC 4.47 3.50 - 5.50 E12/L    Hemoglobin 16.8 (H) 11.5 - 15.5 g/dL    Hematocrit 49.1 (H) 34.0 - 48.0 %    .8 (H) 80.0 - 99.9 fL    MCH 37.6 (H) 26.0 - 35.0 pg    MCHC 34.2 32.0 - 34.5 %    RDW 13.0 11.5 - 15.0 fL    Platelets 198 846 - 002 E9/L    MPV 10.5 7.0 - 12.0 fL    Neutrophils % 93.9 (H) 43.0 - 80.0 % Lymphocytes % 1.7 (L) 20.0 - 42.0 %    Monocytes % 3.5 2.0 - 12.0 %    Eosinophils % 0.1 0.0 - 6.0 %    Basophils % 0.9 0.0 - 2.0 %    Neutrophils Absolute 7.90 (H) 1.80 - 7.30 E9/L    Lymphocytes Absolute 0.17 (L) 1.50 - 4.00 E9/L    Monocytes Absolute 0.34 0.10 - 0.95 E9/L    Eosinophils Absolute 0.00 (L) 0.05 - 0.50 E9/L    Basophils Absolute 0.08 0.00 - 0.20 E9/L   Comprehensive Metabolic Panel w/ Reflex to MG   Result Value Ref Range    Sodium 134 132 - 146 mmol/L    Potassium reflex Magnesium 4.5 3.5 - 5.0 mmol/L    Chloride 99 98 - 107 mmol/L    CO2 20 (L) 22 - 29 mmol/L    Anion Gap 15 7 - 16 mmol/L    Glucose 229 (H) 74 - 99 mg/dL    BUN 21 6 - 23 mg/dL    CREATININE 1.3 (H) 0.5 - 1.0 mg/dL    GFR Non-African American 39 >=60 mL/min/1.73    GFR African American 47     Calcium 10.0 8.6 - 10.2 mg/dL    Total Protein 7.1 6.4 - 8.3 g/dL    Albumin 3.9 3.5 - 5.2 g/dL    Total Bilirubin 0.9 0.0 - 1.2 mg/dL    Alkaline Phosphatase 77 35 - 104 U/L    ALT 12 0 - 32 U/L    AST 24 0 - 31 U/L   Lactic Acid   Result Value Ref Range    Lactic Acid 2.1 0.5 - 2.2 mmol/L   Urinalysis with Microscopic   Result Value Ref Range    Color, UA Yellow Straw/Yellow    Clarity, UA SLCLOUDY Clear    Glucose, Ur Negative Negative mg/dL    Bilirubin Urine Negative Negative    Ketones, Urine 15 (A) Negative mg/dL    Specific Gravity, UA 1.020 1.005 - 1.030    Blood, Urine Negative Negative    pH, UA 5.0 5.0 - 9.0    Protein, UA Negative Negative mg/dL    Urobilinogen, Urine 0.2 <2.0 E.U./dL    Nitrite, Urine POSITIVE (A) Negative    Leukocyte Esterase, Urine SMALL (A) Negative    WBC, UA >20 (A) 0 - 5 /HPF    RBC, UA 2-5 0 - 2 /HPF    Epithelial Cells, UA MODERATE /HPF    Bacteria, UA MANY (A) None Seen /HPF   Troponin   Result Value Ref Range    Troponin, High Sensitivity 13 (H) 0 - 9 ng/L   Troponin   Result Value Ref Range    Troponin, High Sensitivity <6 0 - 9 ng/L   EKG 12 Lead   Result Value Ref Range    Ventricular Rate 87 BPM Atrial Rate 87 BPM    P-R Interval 142 ms    QRS Duration 80 ms    Q-T Interval 404 ms    QTc Calculation (Bazett) 486 ms    P Axis 66 degrees    R Axis 25 degrees    T Axis 62 degrees       RADIOLOGY: Interpreted by Radiologist unless otherwise noted. XR FOOT RIGHT (2 VIEWS)   Final Result   1. No signs of acute fracture or dislocation   2. Prominent plantar calcaneal spur   3. Advanced peripheral vascular arterial disease         XR PELVIS (1-2 VIEWS)   Final Result   Lytic lesion in the left posterior iliac crest not visualized on plain   radiograph. No acute fractures or dislocations in the pelvis. XR CHEST (2 VW)   Final Result   No acute process. XR HUMERUS RIGHT (MIN 2 VIEWS)   Final Result   No evidence of a displaced right humeral fracture         CT Head WO Contrast   Final Result   No acute intracranial abnormality. CT CERVICAL SPINE WO CONTRAST   Final Result   Multilevel degenerative changes identified within the cervical spine with   straightening of the normal lordosis. No acute fracture or dislocation. Nodularity within the thyroid largest nodule measuring 2.1 cm on the left. Consider ultrasound follow-up on an outpatient basis if clinically indicated. Extensive vascular calcifications seen within the carotid bifurcations   bilaterally. CT FACIAL BONES WO CONTRAST   Final Result   No acute facial bone trauma. EKG: As interpreted by this ER physician.   Rate: 87 bpm  Rhythm: sinus  Axis: normal  ST Segments: no acute changes  T-waves: no acute changes  Interpretation: sinus with occasional PACs  Comparison: changes compared to previous EKG    Oxygen Saturation Interpretation: Normal    Meds Given:  Medications   diazePAM (VALIUM) injection 5 mg (5 mg IntraVENous Given 4/12/22 8202)   0.9 % sodium chloride bolus (0 mLs IntraVENous Stopped 4/12/22 1137)   cefTRIAXone (ROCEPHIN) 1,000 mg in sterile water 10 mL IV syringe (1,000 mg IntraVENous Given 4/12/22 1156)       Procedures:  No procedures performed. --------------------------------- PROGRESS NOTES / ADDITIONAL PROVIDER NOTES ---------------------------------  Consultations:  As outlined below. ED Course:  ED Course as of 04/15/22 0918   Tue Apr 12, 2022   1049 On reevaluation, the patient states she is feeling significantly improved following her bolus of IV fluid and dose of Valium. I updated her on the status of her work-up and our plan to discharge her home as long as her UA and Covid are normal.  The patient and family member each verbalized understanding and agreement with that plan. Once we get the rest of her tests and ambulate her, as long as there are no issue she will be discharged home. [ML]   1012 Patient provided urine sample and appeared steady on her feet while ambulating to and from the bathroom. [ML]   4628 Use shared decision making and discussed results in depth with patient and son at bedside. She states she is feeling much better. She does tolerate a p.o. challenge and ambulate to the bathroom without any difficulties. She states that she would like to go back to her independent living facility and follow-up as an outpatient. She was discharged home with antibiotics. [KG]      ED Course User Index  [KG] El Moura DO  [ML] Bindu Roche DO       8:23 AM: All results were discussed with the patient and I have provided specific details regarding the plan of care, diagnosis and associated prognosis. The patient tolerated the visit well and, at the time of discharge, was without objective evidence of hemodynamic instability or an acute process requiring hospitalization and inpatient management. The patient was seen by myself and the assigned attending physician, El Moura DO, who agreed with my assessment and plan as laid out herein.  The importance of follow-up was discussed at the end of the visit and I recommended that the patient be seen by their primary care physician in 2-3 days. The patient verbalized their understanding and agreement with the plan as presented and stated their intention to follow up. Reasons to return to the ER or seek immediate evaluation by a medical provider were discussed at length and all questions were answered to the highest degree of accuracy possible based on the current information available. At this time the patient is stable and safe for discharge. MDM:  Patient presented with dizziness, nausea and vomiting following multiple falls out of bed overnight. On arrival, the patient was hypertensive with remaining vital signs within normal limits. EKG and physical exam were  as documented above. Labs were remarkable for a negative covid test, normal white count, normal lactate, normal trop and a UA that was indicative of a UTI. Culture sent. Head CT was negative for acute intracranial pathology, CT face was negative and CT cervical spine was negative for acute fractures and dislocations though chronic changes were noted and a 2.1 cm thyroid nodule was incidentally identified. CXR was negative for acute cardiopulmonary pathology. Xray of the hips/pelvis was negative for acute fractures and dislocations. Xray of the RUE revealed a hematoma, but not fracture or dislocation was noted. Xray of the right foot showed chronic changes, but no obvious fractures or dislocations were noted. Shortly after arrival, the patient was given a dose of valium and her nausea and dizziness resolved. The patient was given a dose of rocephin for her UTI and was able to ambulate through the department without issue. We discussed the potential for admission vs discharge to her assisted living facility with close follow up and both the patient and her son preferred to have her go home at that time.      Therefore, given the lack of findings necessitating further emergent intervention or hospitalization, the decision was made to discharge the patient with recommendations for follow up and symptomatic care. Reasons to return to the ER were discussed in depth and all questions were answered. The patient was stable at the time of her disposition. Discharge Medication List as of 4/12/2022 12:19 PM      START taking these medications    Details   cefdinir (OMNICEF) 300 MG capsule Take 1 capsule by mouth 2 times daily for 7 days, Disp-14 capsule, R-0Print             Diagnosis:  1. Urinary tract infection without hematuria, site unspecified    2. Fall from bed, initial encounter    3. Injury of nose, initial encounter    4. Traumatic hematoma of right shoulder, initial encounter    5. Dehydration    6. Lightheadedness    7. Thyroid nodule    8. Lytic lesion of bone on x-ray        Disposition:  Patient's disposition: Discharge to home. Patient's condition is stable. This patient was seen, examined and treated with Cain Bush DO. All pertinent aspects of the patient's care were discussed with the attending physician.        Ozzie Cardona DO  Resident  04/15/22 1337

## 2022-04-14 LAB
ORGANISM: ABNORMAL
URINE CULTURE, ROUTINE: ABNORMAL

## 2022-04-15 RX ORDER — DOCUSATE SODIUM 100 MG/1
100 CAPSULE, LIQUID FILLED ORAL 2 TIMES DAILY
COMMUNITY

## 2022-04-15 RX ORDER — LANOLIN ALCOHOL/MO/W.PET/CERES
1000 CREAM (GRAM) TOPICAL DAILY
COMMUNITY

## 2022-04-15 RX ORDER — OXYBUTYNIN CHLORIDE 10 MG/1
TABLET, EXTENDED RELEASE ORAL
COMMUNITY
Start: 2022-04-08

## 2022-04-18 ENCOUNTER — OFFICE VISIT (OUTPATIENT)
Dept: PRIMARY CARE CLINIC | Age: 87
End: 2022-04-18
Payer: MEDICARE

## 2022-04-18 VITALS
TEMPERATURE: 96.4 F | HEART RATE: 82 BPM | DIASTOLIC BLOOD PRESSURE: 72 MMHG | HEIGHT: 64 IN | BODY MASS INDEX: 26.24 KG/M2 | SYSTOLIC BLOOD PRESSURE: 138 MMHG | WEIGHT: 153.7 LBS | OXYGEN SATURATION: 94 %

## 2022-04-18 DIAGNOSIS — R11.0 NAUSEA: Primary | ICD-10-CM

## 2022-04-18 DIAGNOSIS — S40.021D TRAUMATIC ECCHYMOSIS OF RIGHT UPPER ARM, SUBSEQUENT ENCOUNTER: ICD-10-CM

## 2022-04-18 DIAGNOSIS — M79.601 RIGHT ARM PAIN: ICD-10-CM
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
## 2022-04-18 PROCEDURE — 1036F TOBACCO NON-USER: CPT | Performed by: FAMILY MEDICINE

## 2022-04-18 PROCEDURE — 99214 OFFICE O/P EST MOD 30 MIN: CPT | Performed by: FAMILY MEDICINE

## 2022-04-18 PROCEDURE — G8427 DOCREV CUR MEDS BY ELIG CLIN: HCPCS | Performed by: FAMILY MEDICINE

## 2022-04-18 PROCEDURE — 4040F PNEUMOC VAC/ADMIN/RCVD: CPT | Performed by: FAMILY MEDICINE

## 2022-04-18 PROCEDURE — G8417 CALC BMI ABV UP PARAM F/U: HCPCS | Performed by: FAMILY MEDICINE

## 2022-04-18 PROCEDURE — 1090F PRES/ABSN URINE INCON ASSESS: CPT | Performed by: FAMILY MEDICINE

## 2022-04-18 PROCEDURE — 1123F ACP DISCUSS/DSCN MKR DOCD: CPT | Performed by: FAMILY MEDICINE

## 2022-04-18 RX ORDER — TRAMADOL HYDROCHLORIDE 50 MG/1
50 TABLET ORAL EVERY 6 HOURS PRN
Qty: 20 TABLET | Refills: 0 | Status: SHIPPED | OUTPATIENT
Start: 2022-04-18 | End: 2022-04-23

## 2022-04-18 RX ORDER — ONDANSETRON 4 MG/1
4 TABLET, FILM COATED ORAL 3 TIMES DAILY PRN
Qty: 30 TABLET | Refills: 2 | Status: SHIPPED | OUTPATIENT
Start: 2022-04-18

## 2022-04-18 SDOH — ECONOMIC STABILITY: FOOD INSECURITY: WITHIN THE PAST 12 MONTHS, YOU WORRIED THAT YOUR FOOD WOULD RUN OUT BEFORE YOU GOT MONEY TO BUY MORE.: NEVER TRUE

## 2022-04-18 SDOH — ECONOMIC STABILITY: FOOD INSECURITY: WITHIN THE PAST 12 MONTHS, THE FOOD YOU BOUGHT JUST DIDN'T LAST AND YOU DIDN'T HAVE MONEY TO GET MORE.: NEVER TRUE

## 2022-04-18 ASSESSMENT — PATIENT HEALTH QUESTIONNAIRE - PHQ9
SUM OF ALL RESPONSES TO PHQ QUESTIONS 1-9: 2
1. LITTLE INTEREST OR PLEASURE IN DOING THINGS: 1
SUM OF ALL RESPONSES TO PHQ QUESTIONS 1-9: 2
2. FEELING DOWN, DEPRESSED OR HOPELESS: 1
SUM OF ALL RESPONSES TO PHQ QUESTIONS 1-9: 2
SUM OF ALL RESPONSES TO PHQ QUESTIONS 1-9: 2
SUM OF ALL RESPONSES TO PHQ9 QUESTIONS 1 & 2: 2

## 2022-04-18 ASSESSMENT — SOCIAL DETERMINANTS OF HEALTH (SDOH): HOW HARD IS IT FOR YOU TO PAY FOR THE VERY BASICS LIKE FOOD, HOUSING, MEDICAL CARE, AND HEATING?: NOT HARD AT ALL

## 2022-04-18 NOTE — PROGRESS NOTES
Subjective:  80 y.o. female who presents to the office today with chief complaint:  Chief Complaint   Patient presents with    Check-Up     Follow up from ER for a fall     ER f/u: Notes and results from ER visit reviewed and discussed with the pt. Follows w Dr. Villeda Pi- on daily macrobid- UTI w klebsiella was not susceptible to macrobid. Nausea: Requests to have zofran refilled. R UE pain: From fall. Bruising covers large area of the upper arm. Causing difficulty with sleeping. Using [facility] d/t concern for falls. Health Maintenance Due   Topic Date Due    DTaP/Tdap/Td vaccine (1 - Tdap) Never done    Shingles Vaccine (1 of 2) Never done    Pneumococcal 65+ years Vaccine (1 - PCV) Never done   [employer] Visit (AWV)  Never done       Cancer History:  Family Cancer History:    Review of Systems    Review of Systems: All bolded are positive, all others are negative. General:  Fever, chills, diaphoresis, fatigue, malaise, night sweats, weight loss  Psychological:  Anxiety, disorientation, hallucinations. ENT:  Epistaxis, headaches, vertigo, visual changes. Cardiovascular:  Chest pain, irregular heartbeats, palpitations, paroxysmal nocturnal dyspnea. Respiratory:  Shortness of breath, coughing, sputum production, hemoptysis, wheezing, orthopnea. Gastrointestinal:  Nausea, vomiting, diarrhea, heartburn, constipation, abdominal pain, hematemesis, hematochezia, melena, acholic stools  Genito-Urinary:  Dysuria, urgency, frequency, hematuria  Musculoskeletal:  Joint pain, joint stiffness, joint swelling, muscle pain  Neurology:  Headache, focal neurological deficits, weakness, numbness, paresthesia  Derm:  Rashes, ulcers, excoriations, bruising  Extremities:  Decreased ROM, peripheral edema, mottling      Objective:  Vitals:    04/18/22 1520   BP: 138/72   Pulse: 82   Temp: 96.4 °F (35.8 °C)   SpO2: 94%     Physical Exam  Vitals and nursing note reviewed.    Constitutional:       General: She is not in acute distress. Appearance: She is well-developed. She is not diaphoretic. HENT:      Head: Normocephalic and atraumatic. Right Ear: External ear normal.      Left Ear: External ear normal.      Nose: Nose normal.      Mouth/Throat:      Pharynx: No oropharyngeal exudate. Eyes:      General:         Right eye: No discharge. Left eye: No discharge. Conjunctiva/sclera: Conjunctivae normal.      Pupils: Pupils are equal, round, and reactive to light. Cardiovascular:      Rate and Rhythm: Normal rate and regular rhythm. Heart sounds: Normal heart sounds. No murmur heard. No friction rub. No gallop. Pulmonary:      Effort: Pulmonary effort is normal. No respiratory distress. Breath sounds: Normal breath sounds. No wheezing or rales. Abdominal:      General: Bowel sounds are normal. There is no distension. Palpations: Abdomen is soft. Tenderness: There is no abdominal tenderness. There is no guarding or rebound. Musculoskeletal:      Cervical back: Normal range of motion and neck supple. Comments: Right shoulder with ecchymosis covering lateral aspect from [facility] joint to deltoid tubercle. Range of motion is symmetric. Lymphadenopathy:      Cervical: No cervical adenopathy. Neurological:      Mental Status: She is alert and oriented to person, place, and time. Psychiatric:         Behavior: Behavior normal.         Thought Content: Thought content normal.         Judgment: Judgment normal.           Results for orders placed or performed in visit on 04/15/22   Vitamin B12   Result Value Ref Range    Vitamin B-12 518    TSH   Result Value Ref Range    TSH 1.80 uIU/mL   Hemoglobin A1C   Result Value Ref Range    Hemoglobin A1C 5.8 %    AVERAGE GLUCOSE           Assessment and Plan:  Corinne Salcedo was seen today for check-up. Diagnoses and all orders for this visit:    Nausea  -     ondansetron (ZOFRAN) 4 MG tablet;  Take 1 tablet by mouth 3 times daily as

## 2022-04-26 DIAGNOSIS — D45 POLYCYTHEMIA VERA (HCC): ICD-10-CM

## 2022-04-26 LAB
ALBUMIN SERPL-MCNC: 4 G/DL (ref 3.5–5.2)
ALP BLD-CCNC: 61 U/L (ref 35–104)
ALT SERPL-CCNC: 21 U/L (ref 0–32)
ANION GAP SERPL CALCULATED.3IONS-SCNC: 10 MMOL/L (ref 7–16)
ANISOCYTOSIS: ABNORMAL
AST SERPL-CCNC: 27 U/L (ref 0–31)
BASOPHILS ABSOLUTE: 0.08 E9/L (ref 0–0.2)
BASOPHILS RELATIVE PERCENT: 1.2 % (ref 0–2)
BILIRUB SERPL-MCNC: 0.6 MG/DL (ref 0–1.2)
BUN BLDV-MCNC: 20 MG/DL (ref 6–23)
CALCIUM SERPL-MCNC: 9.7 MG/DL (ref 8.6–10.2)
CHLORIDE BLD-SCNC: 100 MMOL/L (ref 98–107)
CO2: 25 MMOL/L (ref 22–29)
CREAT SERPL-MCNC: 1.1 MG/DL (ref 0.5–1)
EOSINOPHILS ABSOLUTE: 0.09 E9/L (ref 0.05–0.5)
EOSINOPHILS RELATIVE PERCENT: 1.4 % (ref 0–6)
GFR AFRICAN AMERICAN: 57
GFR NON-AFRICAN AMERICAN: 47 ML/MIN/1.73
GLUCOSE BLD-MCNC: 118 MG/DL (ref 74–99)
HCT VFR BLD CALC: 47 % (ref 34–48)
HEMOGLOBIN: 15.7 G/DL (ref 11.5–15.5)
IMMATURE GRANULOCYTES #: 0.05 E9/L
IMMATURE GRANULOCYTES %: 0.8 % (ref 0–5)
LYMPHOCYTES ABSOLUTE: 0.99 E9/L (ref 1.5–4)
LYMPHOCYTES RELATIVE PERCENT: 15 % (ref 20–42)
MCH RBC QN AUTO: 37.4 PG (ref 26–35)
MCHC RBC AUTO-ENTMCNC: 33.4 % (ref 32–34.5)
MCV RBC AUTO: 111.9 FL (ref 80–99.9)
MONOCYTES ABSOLUTE: 0.21 E9/L (ref 0.1–0.95)
MONOCYTES RELATIVE PERCENT: 3.2 % (ref 2–12)
NEUTROPHILS ABSOLUTE: 5.16 E9/L (ref 1.8–7.3)
NEUTROPHILS RELATIVE PERCENT: 78.4 % (ref 43–80)
OVALOCYTES: ABNORMAL
PDW BLD-RTO: 13.7 FL (ref 11.5–15)
PLATELET # BLD: 235 E9/L (ref 130–450)
PMV BLD AUTO: 10.9 FL (ref 7–12)
POIKILOCYTES: ABNORMAL
POLYCHROMASIA: ABNORMAL
POTASSIUM SERPL-SCNC: 4.6 MMOL/L (ref 3.5–5)
RBC # BLD: 4.2 E12/L (ref 3.5–5.5)
SODIUM BLD-SCNC: 135 MMOL/L (ref 132–146)
TOTAL PROTEIN: 6.7 G/DL (ref 6.4–8.3)
WBC # BLD: 6.6 E9/L (ref 4.5–11.5)

## 2022-05-02 ENCOUNTER — OFFICE VISIT (OUTPATIENT)
Dept: ONCOLOGY | Age: 87
End: 2022-05-02

## 2022-05-02 ENCOUNTER — HOSPITAL ENCOUNTER (OUTPATIENT)
Dept: INFUSION THERAPY | Age: 87
Discharge: HOME | End: 2022-05-02
Payer: MEDICARE

## 2022-05-02 VITALS
DIASTOLIC BLOOD PRESSURE: 57 MMHG | TEMPERATURE: 97.5 F | OXYGEN SATURATION: 95 % | SYSTOLIC BLOOD PRESSURE: 134 MMHG | BODY MASS INDEX: 26.12 KG/M2 | HEART RATE: 75 BPM | WEIGHT: 153 LBS | HEIGHT: 64 IN

## 2022-05-02 VITALS — DIASTOLIC BLOOD PRESSURE: 63 MMHG | SYSTOLIC BLOOD PRESSURE: 126 MMHG | HEART RATE: 74 BPM

## 2022-05-02 DIAGNOSIS — D45 POLYCYTHEMIA VERA (HCC): Primary | ICD-10-CM

## 2022-05-02 DIAGNOSIS — N18.30 STAGE 3 CHRONIC KIDNEY DISEASE, UNSPECIFIED WHETHER STAGE 3A OR 3B CKD (HCC): ICD-10-CM

## 2022-05-02 PROCEDURE — 99195 PHLEBOTOMY: CPT

## 2022-05-02 RX ORDER — 0.9 % SODIUM CHLORIDE 0.9 %
500 INTRAVENOUS SOLUTION INTRAVENOUS ONCE
Status: CANCELLED | OUTPATIENT
Start: 2022-05-02 | End: 2022-05-02

## 2022-05-02 RX ORDER — 0.9 % SODIUM CHLORIDE 0.9 %
250 INTRAVENOUS SOLUTION INTRAVENOUS ONCE
Status: CANCELLED | OUTPATIENT
Start: 2022-05-02 | End: 2022-05-02

## 2022-05-02 RX ORDER — HYDROXYUREA 500 MG/1
500 CAPSULE ORAL DAILY
Qty: 90 CAPSULE | Refills: 0 | Status: SHIPPED
Start: 2022-05-02 | End: 2022-07-25 | Stop reason: SDUPTHER

## 2022-05-02 NOTE — PROGRESS NOTES
[address] [facility]. Damian Amato        Pt Name: Daniel Ny  YOB: 1935  Date of evaluation: 5/2/2022  Primary Care Physician: Cecelia Johnson DO  Reason for evaluation:   No chief complaint on file. Subjective: Here for  follow up. Tolerating Hydrea well. OBJECTIVE:  VITALS:  height is 5' 4\" (1.626 m) and weight is 153 lb (69.4 kg). Her temperature is 97.5 °F (36.4 °C). Her blood pressure is 134/57 (abnormal) and her pulse is 75. Her oxygen saturation is 95%. Physical Exam:  Performance Status: 0  Well developed, well nourished female  General: AAO to person, place, time, in no acute distress. Head and neck: PERRLA, EOMI . Sclera non icteric. Oropharynx: Clear. Neck: no JVD,  no adenopathy. Heart: Regular rate and regular rhythm. No murmur. Lungs: Clear to auscultation. Abdomen: Soft, non-tender;no masses, no organomegaly. Extremities: No edema,no cyanosis, no clubbing. Neurologic:Cranial nerves grossly intact. No focal motor or sensory deficits. Skin:  No rash. Medications  Prior to Admission medications    Medication Sig Start Date End Date Taking?  Authorizing Provider   ondansetron (ZOFRAN) 4 MG tablet Take 1 tablet by mouth 3 times daily as needed for Nausea or Vomiting 4/18/22   Francisco Pruitt,    oxybutynin (DITROPAN-XL) 10 MG extended release tablet  4/8/22   Historical Provider, MD   vitamin B-12 (CYANOCOBALAMIN) 1000 MCG tablet Take 1,000 mcg by mouth daily    Historical Provider, MD   Melatonin 5 MG CAPS Take 5 mg by mouth daily    Historical Provider, MD   docusate sodium (COLACE) 100 MG capsule Take 100 mg by mouth 2 times daily    Historical Provider, MD   alendronate (FOSAMAX) 70 MG tablet Take 1 tablet by mouth once a week 4/8/22   Cecelia Johnson DO   escitalopram (LEXAPRO) 20 MG tablet TAKE 1 TABLET EVERY DAY 3/10/22   Cecelia Johnson DO   pantoprazole (PROTONIX) 40 MG tablet TAKE 1 TABLET EVERY DAY 3/10/22 Toney Gilmore DO   lisinopril (PRINIVIL;ZESTRIL) 10 MG tablet TAKE 1 TABLET EVERY DAY 3/10/22   Toney Gilmore DO   atorvastatin (LIPITOR) 40 MG tablet TAKE 1 TABLET EVERY DAY 3/1/22   Toney Gilmore DO   nitrofurantoin, macrocrystal-monohydrate, (MACROBID) 100 MG capsule TAKE 1 CAPSULE EVERY DAY FOR 10 DAYS.  RESUME AFTER COMPLETING COURSE OF LEVAQUIN. 2/8/22   Toney Gilmore DO   hydroxyurea (HYDREA) 500 MG chemo capsule Take 1 capsule by mouth daily 1/31/22   Lesly Jaeger MD   Multiple Vitamins-Minerals (THERAPEUTIC MULTIVITAMIN-MINERALS) tablet Take 1 tablet by mouth daily    Historical Provider, MD   aspirin 81 MG EC tablet Take 81 mg by mouth daily    Historical Provider, MD   calcium carbonate (OSCAL) 500 MG TABS tablet Take 500 mg by mouth daily Indications: plus 10 mcg D3 Patient is taking 3 tablets daily    Historical Provider, MD   colestipol (COLESTID) 1 g tablet Take 2 g by mouth 2 times daily     Historical Provider, MD    Scheduled Meds:  Continuous Infusions:  PRN Meds:.        Recent Laboratory Data-     Lab Results   Component Value Date    WBC 6.6 04/26/2022    HGB 15.7 (H) 04/26/2022    HCT 47.0 04/26/2022    .9 (H) 04/26/2022     04/26/2022    LYMPHOPCT 15.0 (L) 04/26/2022    RBC 4.20 04/26/2022    MCH 37.4 (H) 04/26/2022    MCHC 33.4 04/26/2022    RDW 13.7 04/26/2022    NEUTOPHILPCT 78.4 04/26/2022    MONOPCT 3.2 04/26/2022    BASOPCT 1.2 04/26/2022    NEUTROABS 5.16 04/26/2022    LYMPHSABS 0.99 (L) 04/26/2022    MONOSABS 0.21 04/26/2022    EOSABS 0.09 04/26/2022    BASOSABS 0.08 04/26/2022       Lab Results   Component Value Date     04/26/2022    K 4.6 04/26/2022     04/26/2022    CO2 25 04/26/2022    BUN 20 04/26/2022    CREATININE 1.1 (H) 04/26/2022    GLUCOSE 118 (H) 04/26/2022    CALCIUM 9.7 04/26/2022    PROT 6.7 04/26/2022    LABALBU 4.0 04/26/2022    BILITOT 0.6 04/26/2022    ALKPHOS 61 04/26/2022    AST 27 04/26/2022    ALT 21 04/26/2022    LABGLOM 47 04/26/2022    GFRAA 57 04/26/2022         QUALITATIVE JAK2 MUTATION:  POSITIVE.      BCR/ABL1 Interphase FISH:  NEGATIVE          Radiology-  US RIGHT BREAST:  8/24/2020  1. 2 tiny cystic lesions including simple cyst at the 3 o'clock position and    mildly complex cyst at the 6 o'clock patient vision.  Both of these measure 4    mm. 2. Otherwise, unremarkable right breast ultrasound and diagnostic mammogram.    Please note that a negative report should not delay biopsy if there is a    clinically suspicious palpable abnormality.  Unless otherwise clinically    indicated, follow-up mammography and ultrasound of the right breast in 6    months is suggested.         BIRADS:    BIRADS - CATEGORY 3         Findings are probably benign. A short interval follow-up is recommended in 6    months.         OVERALL ASSESSMENT - PROBABLY BENIGN. DIGITAL AILIN RIGHT MAMMOGRAM: 8/24/2020  No radiographic evidence of malignancy.  The asymmetric density seen on the    recent screening mammogram is not confirmed on the additional views. Subsequent ultrasound study only showed a couple of tiny cystic lesions with    no suspicious mass seen.  Unless otherwise clinically indicated, follow-up    mammography and ultrasound of the right breast in 6 months is suggested.         BIRADS:    BIRADS - CATEGORY 3         Findings are probably benign. A short interval follow-up is recommended in 6    months.         OVERALL ASSESSMENT - PROBABLY BENIGN. BILATERAL SCREENING MAMMOGRAM:  8/04/2020.  1. Approximately 1 cm asymmetry seen within the medial aspect of the right    breast 4.7 cm from the nipple.  The asymmetry is only seen on the CC view. Dedicated diagnostic mammogram of the right breast is recommended.     2. Stable mammogram of the left breast.  Annual screening mammography left    breast is recommended.         BIRADS:    BIRADS - CATEGORY 0         Additional imaging is recommended at this time.      OVERALL ASSESSMENT - INCOMPLETE:NEED ADDITIONAL IMAGING EVALUATION.                 ASSESSMENT/PLAN :  Polycythemia with borderline neutrophilic leukocytosis.     She has no clinical suggestion of reactive compensatory secondary polycythemia with absence of prior history of smoking COPD or sleep apnea     Rule out primary myeloproliferative disorder such as P vera. Rule out CML doubt     To check CBC, blood smear review, baseline EPO level, qualitative JAK2 mutation as well as BCR ABL by FISH. We will follow    Her EPO level was low at 1. Qualitative Jose Guadalupe 2 mutation was positive  BCR-ABL by FISH was negative    She has polycythemia vera  Hemoglobin was elevated at 17.9 on 3/9/2020    . Hemoglobin was elevated at 17.7 on 3/30/2020 and she underwent a  therapeutic phlebotomy and 250 cc of blood was removed under medical supervision. She was initiated on hydroxyurea 500 mg daily. Hemoglobin was elevated at 18.0  on 4/24/2020 and she underwent a  therapeutic phlebotomy and 250 cc of blood was  removed under medical supervision. Her Hydrea was increased to 500 mg twice daily. She was given PRN Zofran for nausea    Hgb was 16 on 5/27/2020 and she underwent phlebotomy with a target hemoglobin of 15  She will be maintained on Hydrea 500 mg twice daily. Hgb was 11.7  on  7/28/2020  Hydrea dose was decreased to 500 mg daily    Hgb was 12.7  on  8/25/2020  Hgb was 14.3  on  9/22/2020. No phlebotomy warranted to continue Hydrea 500 mg daily      11/09/2020  Hgb was 15.9; Hct was 45.5  on 10/27/2020  She is to undergo a  therapeutic phlebotomy and 250 cc of blood will be  removed under medical supervision with a target hemoglobin of 15. Her Hydrea dose will be increased back to 500 mg twice daily      4/14/2021  CBC today reviewed and is in the normal range and no phlebotomy indicated. She is to continue on Hydrea 500 mg twice daily    9/15/2021  Tolerating Hydrea well.   CBC shows leukopenia with a hemoglobin of 11.8 elevated MCV and a platelet count of 426    To decrease Hydrea dose to 500 mg daily  To have follow-up CBC in 6 weeks    10/27/21  Tolerating Hydrea well. Her dose had been decreased to 500 mg daily. Her CBC today shows a WBC count of 4.9 with a hemoglobin of 13.8  and normal platelet count of 708. CBC and CMP are unremarkable  She has CKD stable stage III. To continue present dose of Hydrea    1/31/2022  Continues on Hydrea 500 mg daily and tolerating it well  Her CBC today shows a hemoglobin of 14.8 with an MCV of 113 with normal WBC count of 6.5 and normal platelet count of 892  Renal function slightly improved. Patient with polycythemia vera. No phlebotomy warranted today. To continue Hydrea 500 mg p.o. daily    5/2/22  Feels fairly well and continues on Hydrea 500 mg daily without any side effects. Most recent CBC shows a hemoglobin of 15.7. She will be recommended therapeutic phlebotomy today and to 50 cc of blood will be withdrawn under medical supervision. To continue present dose of Hydrea. Hao Odom. Flor Salgado M.D., F.A.C.P.   Electronically signed 5/2/2022 at 1:43 PM

## 2022-05-02 NOTE — PROGRESS NOTES
IV site initiated at left forearm  with 18 gauge # intima. Therapeutic phlebotomy performed for 250 cc blood. Tolerated procedure and 15 minute observation without adverse reaction. Took fluids freely throughout observation. Vitals: There were no vitals taken for this visit. Verbal information provided to patient / family on procedure and post procedure care. Encouraged to call clinic during clinic hours and physician after clinic hours if questions or concerns arise.

## 2022-05-09 RX ORDER — ATORVASTATIN CALCIUM 40 MG/1
TABLET, FILM COATED ORAL
Qty: 90 TABLET | Refills: 0 | Status: SHIPPED | OUTPATIENT
Start: 2022-05-09

## 2022-05-16 RX ORDER — PANTOPRAZOLE SODIUM 40 MG/1
TABLET, DELAYED RELEASE ORAL
Qty: 90 TABLET | Refills: 0 | Status: SHIPPED | OUTPATIENT
Start: 2022-05-16

## 2022-05-16 RX ORDER — ESCITALOPRAM OXALATE 20 MG/1
TABLET ORAL
Qty: 90 TABLET | Refills: 0 | Status: SHIPPED | OUTPATIENT
Start: 2022-05-16

## 2022-05-16 RX ORDER — LISINOPRIL 10 MG/1
TABLET ORAL
Qty: 90 TABLET | Refills: 0 | Status: SHIPPED | OUTPATIENT
Start: 2022-05-16

## 2022-05-23 RX ORDER — MONTELUKAST SODIUM 4 MG/1
TABLET, CHEWABLE ORAL
Qty: 360 TABLET | Refills: 0 | Status: SHIPPED | OUTPATIENT
Start: 2022-05-23

## 2022-06-03 DIAGNOSIS — D45 POLYCYTHEMIA VERA (HCC): ICD-10-CM

## 2022-06-03 LAB
ALBUMIN SERPL-MCNC: 4.2 G/DL (ref 3.5–5.2)
ALP BLD-CCNC: 63 U/L (ref 35–104)
ALT SERPL-CCNC: 13 U/L (ref 0–32)
ANION GAP SERPL CALCULATED.3IONS-SCNC: 10 MMOL/L (ref 7–16)
ANISOCYTOSIS: ABNORMAL
AST SERPL-CCNC: 23 U/L (ref 0–31)
BASOPHILS ABSOLUTE: 0.12 E9/L (ref 0–0.2)
BASOPHILS RELATIVE PERCENT: 1.7 % (ref 0–2)
BILIRUB SERPL-MCNC: 0.4 MG/DL (ref 0–1.2)
BUN BLDV-MCNC: 20 MG/DL (ref 6–23)
CALCIUM SERPL-MCNC: 9.4 MG/DL (ref 8.6–10.2)
CHLORIDE BLD-SCNC: 104 MMOL/L (ref 98–107)
CO2: 24 MMOL/L (ref 22–29)
CREAT SERPL-MCNC: 1.2 MG/DL (ref 0.5–1)
EOSINOPHILS ABSOLUTE: 0.31 E9/L (ref 0.05–0.5)
EOSINOPHILS RELATIVE PERCENT: 4.4 % (ref 0–6)
GFR AFRICAN AMERICAN: 51
GFR NON-AFRICAN AMERICAN: 42 ML/MIN/1.73
GLUCOSE BLD-MCNC: 144 MG/DL (ref 74–99)
HCT VFR BLD CALC: 45.5 % (ref 34–48)
HEMOGLOBIN: 15.2 G/DL (ref 11.5–15.5)
LYMPHOCYTES ABSOLUTE: 0.77 E9/L (ref 1.5–4)
LYMPHOCYTES RELATIVE PERCENT: 11.3 % (ref 20–42)
MACRO-OVALOCYTES: ABNORMAL
MCH RBC QN AUTO: 37.3 PG (ref 26–35)
MCHC RBC AUTO-ENTMCNC: 33.4 % (ref 32–34.5)
MCV RBC AUTO: 111.5 FL (ref 80–99.9)
MONOCYTES ABSOLUTE: 0.21 E9/L (ref 0.1–0.95)
MONOCYTES RELATIVE PERCENT: 2.6 % (ref 2–12)
NEUTROPHILS ABSOLUTE: 5.6 E9/L (ref 1.8–7.3)
NEUTROPHILS RELATIVE PERCENT: 80 % (ref 43–80)
PDW BLD-RTO: 14.5 FL (ref 11.5–15)
PLATELET # BLD: 260 E9/L (ref 130–450)
PMV BLD AUTO: 10.8 FL (ref 7–12)
POTASSIUM SERPL-SCNC: 4.8 MMOL/L (ref 3.5–5)
RBC # BLD: 4.08 E12/L (ref 3.5–5.5)
SODIUM BLD-SCNC: 138 MMOL/L (ref 132–146)
TOTAL PROTEIN: 6.5 G/DL (ref 6.4–8.3)
WBC # BLD: 7 E9/L (ref 4.5–11.5)

## 2022-06-06 ENCOUNTER — OFFICE VISIT (OUTPATIENT)
Dept: ONCOLOGY | Age: 87
End: 2022-06-06

## 2022-06-06 ENCOUNTER — HOSPITAL ENCOUNTER (OUTPATIENT)
Dept: INFUSION THERAPY | Age: 87
Discharge: HOME | End: 2022-06-06
Payer: MEDICARE

## 2022-06-06 VITALS
SYSTOLIC BLOOD PRESSURE: 125 MMHG | TEMPERATURE: 99.8 F | DIASTOLIC BLOOD PRESSURE: 63 MMHG | HEART RATE: 83 BPM | RESPIRATION RATE: 18 BRPM

## 2022-06-06 VITALS
HEIGHT: 64 IN | BODY MASS INDEX: 26.22 KG/M2 | HEART RATE: 82 BPM | OXYGEN SATURATION: 95 % | DIASTOLIC BLOOD PRESSURE: 77 MMHG | TEMPERATURE: 98.4 F | SYSTOLIC BLOOD PRESSURE: 136 MMHG | WEIGHT: 153.6 LBS

## 2022-06-06 DIAGNOSIS — D45 POLYCYTHEMIA VERA (HCC): Primary | ICD-10-CM

## 2022-06-06 DIAGNOSIS — N18.30 STAGE 3 CHRONIC KIDNEY DISEASE, UNSPECIFIED WHETHER STAGE 3A OR 3B CKD (HCC): ICD-10-CM

## 2022-06-06 PROCEDURE — 99195 PHLEBOTOMY: CPT

## 2022-06-06 RX ORDER — 0.9 % SODIUM CHLORIDE 0.9 %
500 INTRAVENOUS SOLUTION INTRAVENOUS ONCE
Status: CANCELLED | OUTPATIENT
Start: 2022-06-06 | End: 2022-06-06

## 2022-06-06 RX ORDER — 0.9 % SODIUM CHLORIDE 0.9 %
250 INTRAVENOUS SOLUTION INTRAVENOUS ONCE
Status: CANCELLED | OUTPATIENT
Start: 2022-06-06 | End: 2022-06-06

## 2022-06-06 NOTE — PROGRESS NOTES
IV site initiated at IV initiated at [facility] with 20gauge# intima. Therapeutic phlebotomy performed for 250 cc blood. Tolerated procedure and 15 minute observation without adverse reaction. Took fluids freely throughout observation. Vitals: /63   Pulse 83   Temp 99.8 °F (37.7 °C)   Resp 18        Verbal information provided to patient / family on procedure and post procedure care. Encouraged to call clinic during clinic hours and physician after clinic hours if questions or concerns arise.

## 2022-06-06 NOTE — DISCHARGE INSTR - COC
Continuity of Care Form    Patient Name: Shahrzad Query   :  1935  MRN:  [id number]    Admit date:  2022  Discharge date:  ***    Code Status Order: Prior   Advance Directives:      Admitting Physician:  No admitting provider for patient encounter. PCP: Dina Luna DO    Discharging Nurse: [facility] Unit/Room#: No information available for this encounter. Discharging Unit Phone Number: ***    Emergency Contact:   Extended Emergency Contact Information  Primary Emergency Contact: [address] [facility], [address] [facility] Phone: [phone]  Mobile Phone: [phone]  Relation: Child  Preferred language: English   needed? No  Secondary Emergency Contact: Rickie [facility] Phone: [phone]  Mobile Phone: [phone]  Relation: Other  Preferred language: English   needed?  No    Past Surgical History:  Past Surgical History:   Procedure Laterality Date    CHOLECYSTECTOMY      PARATHYROIDECTOMY      THYROIDECTOMY, PARTIAL      TUBAL LIGATION         Immunization History:   Immunization History   Administered Date(s) Administered    COVID-19, Pfizer Purple top, DILUTE for use, 12+ yrs, 30mcg/0.3mL dose 2021, 2021, 10/30/2021       Active Problems:  Patient Active Problem List   Diagnosis Code    Hypertension I10    Polycythemia vera ([facility] [address] D45    Thyroid disease E07.9    Hyperlipidemia E78.5    Chronic renal disease, stage III ([facility] [address] [430683] N18.30       Isolation/Infection:   Isolation            No Isolation          Patient Infection Status       Infection Onset Added Last Indicated Last Indicated By Review Planned Expiration Resolved Resolved By    None active    Resolved    COVID-19 (Rule Out) 22 COVID-19, Rapid (Ordered)   22 Rule-Out Test Resulted            Nurse Assessment:  Last Vital Signs: /63   Pulse 83   Temp 99.8 °F (37.7 °C)   Resp 18     Last documented pain score (0-10 scale):    Last Weight:   Wt Readings from Last 1 Encounters:   22 153 lb 9.6 oz (69.7 kg)     Mental Status:  {IP PT MENTAL STATUS:58439}    IV Access:  [employer] IV ACCESS:991573735}    Nursing Mobility/ADLs:  Walking   {CHP DME AQPC:212161208}  Transfer  {CHP DME FKMV:570423517}  Bathing  {CHP DME HCZB:424348282}  Dressing  {CHP DME MSFR:526319954}  Toileting  {CHP DME TZOO:217608811}  Feeding  {CHP DME FVE}  Med Admin  {CHP DME JKNN:802420999}  Med Delivery   { NUBIA MED Delivery:978807131}    Wound Care Documentation and Therapy:        Elimination:  Continence: Bowel: {YES / TA:81543}  Bladder: {YES / JL:08808}  Urinary Catheter: {Urinary Catheter:649845008}   Colostomy/Ileostomy/Ileal Conduit: {YES / IA:31952}       Date of Last BM: ***  No intake or output data in the 24 hours ending 22 1511  No intake/output data recorded.     Safety Concerns:     [employer] Safety Concerns:606915530}    Impairments/Disabilities:      [employer] Impairments/Disabilities:195007448}    Nutrition Therapy:  Current Nutrition Therapy:   [employer] Diet List:962084420}    Routes of Feeding: {P DME Other Feedings:960789802}  Liquids: {Slp liquid thickness:25385}  Daily Fluid Restriction: {CHP DME Yes amt example:208932546}  Last Modified Barium Swallow with Video (Video Swallowing Test): {Done Not Done JUNM:714806680}    Treatments at the Time of Hospital Discharge:   Respiratory Treatments: ***  Oxygen Therapy:  {Therapy; copd oxygen:79458}  Ventilator:    {[facility] Vent DKPD:944580717}    Rehab Therapies: {THERAPEUTIC INTERVENTION:4549022858}  Weight Bearing Status/Restrictions: [employer] Weight Bearin}  Other Medical Equipment (for information only, NOT a DME order):  {EQUIPMENT:332324119}  Other Treatments: ***    Patient's personal belongings (please select all that are sent with patient):  {CHP DME Belongings:592791984}    RN SIGNATURE:  {Esignature:386923039}    CASE MANAGEMENT/SOCIAL WORK SECTION    Inpatient Status Date: ***    Readmission Risk Assessment Score:  Readmission Risk              Risk of Unplanned Readmission:  0           Discharging to Facility/ Agency   Name:   Address:  Phone:  Fax:    Dialysis Facility (if applicable)   Name:  Address:  Dialysis Schedule:  Phone:  Fax:    / signature: {Esignature:939329543}    PHYSICIAN SECTION    Prognosis: {Prognosis:0106066466}    Condition at Discharge: Johan Murcia Patient Condition:979995393}    Rehab Potential (if transferring to Rehab): {Prognosis:5964547478}    Recommended Labs or Other Treatments After Discharge: ***    Physician Certification: I certify the above information and transfer of Ailyn De Jesus  is necessary for the continuing treatment of the diagnosis listed and that she requires {Admit to Appropriate Level of Care:26537} for {GREATER/LESS:986154686} 30 days.      Update Admission H&P: {CHP DME Changes in HHRTX:656741031}    PHYSICIAN SIGNATURE:  {Esignature:783346654}

## 2022-06-06 NOTE — PROGRESS NOTES
[address] [facility]. Damian Nguyen        Pt Name: Martell Whitaker  YOB: 1935  Date of evaluation: 6/6/2022  Primary Care Physician: Annie Jarrett DO  Reason for evaluation:   Chief Complaint   Patient presents with    Other     Polycythemia vera    Chronic Kidney Disease     Stage III    Follow-up        Subjective: Here for  follow up. Tolerating Hydrea well. OBJECTIVE:  VITALS:  height is 5' 4\" (1.626 m) and weight is 153 lb 9.6 oz (69.7 kg). Her temperature is 98.4 °F (36.9 °C). Her blood pressure is 136/77 and her pulse is 82. Her oxygen saturation is 95%. Physical Exam:  Performance Status: 0  Well developed, well nourished female  General: AAO to person, place, time, in no acute distress. Head and neck: PERRLA, EOMI . Sclera non icteric. Oropharynx: Clear. Neck: no JVD,  no adenopathy. Heart: Regular rate and regular rhythm. No murmur. Lungs: Clear to auscultation. Abdomen: Soft, non-tender;no masses, no organomegaly. Extremities: No edema,no cyanosis, no clubbing. Neurologic:Cranial nerves grossly intact. No focal motor or sensory deficits. Skin:  No rash. Medications  Prior to Admission medications    Medication Sig Start Date End Date Taking?  Authorizing Provider   colestipol (COLESTID) 1 g tablet TAKE 2 TABLETS TWICE DAILY 5/23/22  Yes Annie Jarrett DO   lisinopril (PRINIVIL;ZESTRIL) 10 MG tablet TAKE 1 TABLET EVERY DAY 5/16/22  Yes Annie Jarrett DO   pantoprazole (PROTONIX) 40 MG tablet TAKE 1 TABLET EVERY DAY 5/16/22  Yes Annie Jarrett DO   escitalopram (LEXAPRO) 20 MG tablet TAKE 1 TABLET EVERY DAY 5/16/22  Yes Annie Jarrett DO   atorvastatin (LIPITOR) 40 MG tablet TAKE 1 TABLET EVERY DAY 5/9/22  Yes Annie Jarrett DO   hydroxyurea (HYDREA) 500 MG chemo capsule Take 1 capsule by mouth daily 5/2/22  Yes Alberto Hernandez MD   ondansetron (ZOFRAN) 4 MG tablet Take 1 tablet by mouth 3 times daily as needed for Nausea or Vomiting 4/18/22  Yes Bhavesh Pruitt, DO   oxybutynin (DITROPAN-XL) 10 MG extended release tablet  4/8/22  Yes Historical Provider, MD   vitamin B-12 (CYANOCOBALAMIN) 1000 MCG tablet Take 1,000 mcg by mouth daily   Yes Historical Provider, MD   Melatonin 5 MG CAPS Take 5 mg by mouth daily   Yes Historical Provider, MD   docusate sodium (COLACE) 100 MG capsule Take 100 mg by mouth 2 times daily   Yes Historical Provider, MD   alendronate (FOSAMAX) 70 MG tablet Take 1 tablet by mouth once a week 4/8/22  Yes Nellie Bearden, DO   nitrofurantoin, macrocrystal-monohydrate, (MACROBID) 100 MG capsule TAKE 1 CAPSULE EVERY DAY FOR 10 DAYS.  RESUME AFTER COMPLETING COURSE OF LEVAQUIN. 2/8/22  Yes Nellie Bearden, DO   Multiple Vitamins-Minerals (THERAPEUTIC MULTIVITAMIN-MINERALS) tablet Take 1 tablet by mouth daily   Yes Historical Provider, MD   aspirin 81 MG EC tablet Take 81 mg by mouth daily   Yes Historical Provider, MD   calcium carbonate (OSCAL) 500 MG TABS tablet Take 500 mg by mouth daily Indications: plus 10 mcg D3 Patient is taking 3 tablets daily   Yes Historical Provider, MD    Scheduled Meds:  Continuous Infusions:  PRN Meds:.        Recent Laboratory Data-     Lab Results   Component Value Date    WBC 7.0 06/03/2022    HGB 15.2 06/03/2022    HCT 45.5 06/03/2022    .5 (H) 06/03/2022     06/03/2022    LYMPHOPCT 11.3 (L) 06/03/2022    RBC 4.08 06/03/2022    MCH 37.3 (H) 06/03/2022    MCHC 33.4 06/03/2022    RDW 14.5 06/03/2022    NEUTOPHILPCT 80.0 06/03/2022    MONOPCT 2.6 06/03/2022    BASOPCT 1.7 06/03/2022    NEUTROABS 5.60 06/03/2022    LYMPHSABS 0.77 (L) 06/03/2022    MONOSABS 0.21 06/03/2022    EOSABS 0.31 06/03/2022    BASOSABS 0.12 06/03/2022       Lab Results   Component Value Date     06/03/2022    K 4.8 06/03/2022     06/03/2022    CO2 24 06/03/2022    BUN 20 06/03/2022    CREATININE 1.2 (H) 06/03/2022    GLUCOSE 144 (H) 06/03/2022    CALCIUM 9.4 06/03/2022    PROT 6.5 06/03/2022    LABALBU 4.2 06/03/2022    BILITOT 0.4 06/03/2022    ALKPHOS 63 06/03/2022    AST 23 06/03/2022    ALT 13 06/03/2022    LABGLOM 42 06/03/2022    GFRAA 51 06/03/2022         QUALITATIVE JAK2 MUTATION:  POSITIVE.      BCR/ABL1 Interphase FISH:  NEGATIVE          Radiology-  US RIGHT BREAST:  8/24/2020  1. 2 tiny cystic lesions including simple cyst at the 3 o'clock position and    mildly complex cyst at the 6 o'clock patient vision.  Both of these measure 4    mm. 2. Otherwise, unremarkable right breast ultrasound and diagnostic mammogram.    Please note that a negative report should not delay biopsy if there is a    clinically suspicious palpable abnormality.  Unless otherwise clinically    indicated, follow-up mammography and ultrasound of the right breast in 6    months is suggested.         BIRADS:    BIRADS - CATEGORY 3         Findings are probably benign. A short interval follow-up is recommended in 6    months.         OVERALL ASSESSMENT - PROBABLY BENIGN. DIGITAL AILIN RIGHT MAMMOGRAM: 8/24/2020  No radiographic evidence of malignancy.  The asymmetric density seen on the    recent screening mammogram is not confirmed on the additional views. Subsequent ultrasound study only showed a couple of tiny cystic lesions with    no suspicious mass seen.  Unless otherwise clinically indicated, follow-up    mammography and ultrasound of the right breast in 6 months is suggested.         BIRADS:    BIRADS - CATEGORY 3         Findings are probably benign. A short interval follow-up is recommended in 6    months.         OVERALL ASSESSMENT - PROBABLY BENIGN. BILATERAL SCREENING MAMMOGRAM:  8/04/2020.  1. Approximately 1 cm asymmetry seen within the medial aspect of the right    breast 4.7 cm from the nipple.  The asymmetry is only seen on the CC view. Dedicated diagnostic mammogram of the right breast is recommended.     2. Stable mammogram of the left breast.  Annual screening mammography left    breast is recommended.         BIRADS:    BIRADS - CATEGORY 0         Additional imaging is recommended at this time.         OVERALL ASSESSMENT - INCOMPLETE:NEED ADDITIONAL IMAGING EVALUATION.                 ASSESSMENT/PLAN :  Polycythemia with borderline neutrophilic leukocytosis.     She has no clinical suggestion of reactive compensatory secondary polycythemia with absence of prior history of smoking COPD or sleep apnea     Rule out primary myeloproliferative disorder such as P vera. Rule out CML doubt     To check CBC, blood smear review, baseline EPO level, qualitative JAK2 mutation as well as BCR ABL by FISH. We will follow    Her EPO level was low at 1. Qualitative Jose Guadalupe 2 mutation was positive  BCR-ABL by FISH was negative    She has polycythemia vera  Hemoglobin was elevated at 17.9 on 3/9/2020    . Hemoglobin was elevated at 17.7 on 3/30/2020 and she underwent a  therapeutic phlebotomy and 250 cc of blood was removed under medical supervision. She was initiated on hydroxyurea 500 mg daily. Hemoglobin was elevated at 18.0  on 4/24/2020 and she underwent a  therapeutic phlebotomy and 250 cc of blood was  removed under medical supervision. Her Hydrea was increased to 500 mg twice daily. She was given PRN Zofran for nausea      Hgb was 16 on 5/27/2020 and she underwent phlebotomy with a target hemoglobin of 15  She will be maintained on Hydrea 500 mg twice daily. Hgb was 11.7  on  7/28/2020  Hydrea dose was decreased to 500 mg daily    Hgb was 12.7  on  8/25/2020  Hgb was 14.3  on  9/22/2020. No phlebotomy warranted to continue Hydrea 500 mg daily      11/09/2020  Hgb was 15.9; Hct was 45.5  on 10/27/2020  She is to undergo a  therapeutic phlebotomy and 250 cc of blood will be  removed under medical supervision with a target hemoglobin of 15.   Her Hydrea dose will be increased back to 500 mg twice daily      4/14/2021  CBC today reviewed and is in the normal range and no phlebotomy indicated. She is to continue on Hydrea 500 mg twice daily      9/15/2021  Tolerating Hydrea well. CBC shows leukopenia with a hemoglobin of 11.8 elevated MCV and a platelet count of 342    To decrease Hydrea dose to 500 mg daily  To have follow-up CBC in 6 weeks      10/27/21  Tolerating Hydrea well. Her dose had been decreased to 500 mg daily. Her CBC today shows a WBC count of 4.9 with a hemoglobin of 13.8  and normal platelet count of 163. CBC and CMP are unremarkable  She has CKD stable stage III. To continue present dose of Hydrea      1/31/2022  Continues on Hydrea 500 mg daily and tolerating it well  Her CBC today shows a hemoglobin of 14.8 with an MCV of 113 with normal WBC count of 6.5 and normal platelet count of 364  Renal function slightly improved. Patient with polycythemia vera. No phlebotomy warranted today. To continue Hydrea 500 mg p.o. daily    5/2/22  Feels fairly well and continues on Hydrea 500 mg daily without any side effects. Most recent CBC shows a hemoglobin of 15.7. She will be recommended therapeutic phlebotomy today and to 250 cc of blood will be withdrawn under medical supervision. To continue present dose of Hydrea. 6/6/2022  On Hydrea 500 mg daily. Hgb was 15.2 on 6/3/2022  She will be recommended therapeutic phlebotomy today and to 250 cc of blood will be withdrawn under medical supervision. To continue present dose of Hydrea. Namrata Helm. Clementina Mars M.D., F.A.C.P.   Electronically signed 6/6/2022 at 2:20 PM

## 2022-07-07 RX ORDER — ALENDRONATE SODIUM 70 MG/1
TABLET ORAL
Qty: 12 TABLET | Refills: 0 | Status: SHIPPED
Start: 2022-07-07 | End: 2022-10-06

## 2022-07-12 DIAGNOSIS — M15.9 GENERALIZED OSTEOARTHRITIS: Primary | ICD-10-CM

## 2022-07-12 RX ORDER — TRAMADOL HYDROCHLORIDE 50 MG/1
TABLET ORAL
Qty: 30 TABLET | Refills: 0 | Status: SHIPPED | OUTPATIENT
Start: 2022-07-12 | End: 2022-08-11

## 2022-07-21 DIAGNOSIS — D45 POLYCYTHEMIA VERA (HCC): ICD-10-CM

## 2022-07-25 ENCOUNTER — HOSPITAL ENCOUNTER (OUTPATIENT)
Dept: INFUSION THERAPY | Age: 87
Discharge: HOME | End: 2022-07-25
Payer: MEDICARE

## 2022-07-25 ENCOUNTER — OFFICE VISIT (OUTPATIENT)
Dept: ONCOLOGY | Age: 87
End: 2022-07-25

## 2022-07-25 VITALS
SYSTOLIC BLOOD PRESSURE: 113 MMHG | DIASTOLIC BLOOD PRESSURE: 60 MMHG | OXYGEN SATURATION: 98 % | HEART RATE: 82 BPM | TEMPERATURE: 98 F | RESPIRATION RATE: 18 BRPM

## 2022-07-25 VITALS
WEIGHT: 152 LBS | SYSTOLIC BLOOD PRESSURE: 125 MMHG | BODY MASS INDEX: 25.95 KG/M2 | OXYGEN SATURATION: 97 % | HEART RATE: 89 BPM | DIASTOLIC BLOOD PRESSURE: 55 MMHG | HEIGHT: 64 IN | TEMPERATURE: 99.2 F

## 2022-07-25 DIAGNOSIS — D45 POLYCYTHEMIA VERA (HCC): Primary | ICD-10-CM

## 2022-07-25 DIAGNOSIS — N18.30 STAGE 3 CHRONIC KIDNEY DISEASE, UNSPECIFIED WHETHER STAGE 3A OR 3B CKD (HCC): ICD-10-CM

## 2022-07-25 PROCEDURE — 99195 PHLEBOTOMY: CPT

## 2022-07-25 RX ORDER — 0.9 % SODIUM CHLORIDE 0.9 %
250 INTRAVENOUS SOLUTION INTRAVENOUS ONCE
Status: CANCELLED | OUTPATIENT
Start: 2022-07-25 | End: 2022-07-25

## 2022-07-25 RX ORDER — 0.9 % SODIUM CHLORIDE 0.9 %
500 INTRAVENOUS SOLUTION INTRAVENOUS ONCE
Status: CANCELLED | OUTPATIENT
Start: 2022-07-25 | End: 2022-07-25

## 2022-07-25 RX ORDER — HYDROXYUREA 500 MG/1
500 CAPSULE ORAL DAILY
Qty: 90 CAPSULE | Refills: 0 | Status: SHIPPED | OUTPATIENT
Start: 2022-07-25

## 2022-07-25 NOTE — PROGRESS NOTES
[address] [facility]. [facility] [place]        Pt Name: Masood Light  YOB: 1935  Date of evaluation: 7/25/2022  Primary Care Physician: Osiel Hernandez DO  Reason for evaluation:   Chief Complaint   Patient presents with    Other     Polycythemia vera    Follow-up        Subjective: Here for  follow up. Tolerating Hydrea well. OBJECTIVE:  VITALS:  height is 5' 4\" (1.626 m) and weight is 152 lb (68.9 kg). Her temperature is 99.2 °F (37.3 °C). Her blood pressure is 125/55 (abnormal) and her pulse is 89. Her oxygen saturation is 97%. Physical Exam:  Performance Status: 0  Well developed, well nourished female  General: AAO to person, place, time, in no acute distress. Head and neck: PERRLA, EOMI . Sclera non icteric. Oropharynx: Clear. Neck: no JVD,  no adenopathy. Heart: Regular rate and regular rhythm. No murmur. Lungs: Clear to auscultation. Abdomen: Soft, non-tender;no masses, no organomegaly. Extremities: No edema,no cyanosis, no clubbing. Neurologic:Cranial nerves grossly intact. No focal motor or sensory deficits. Skin:  No rash. Medications  Prior to Admission medications    Medication Sig Start Date End Date Taking?  Authorizing Provider   traMADol (ULTRAM) 50 MG tablet TAKE 1 TABLET EVERY 6 HOURS AS NEEDED FOR PAIN 7/12/22 8/11/22 Yes Osiel Hernandez DO   alendronate (FOSAMAX) 70 MG tablet TAKE 1 TABLET BY MOUTH 1 TIME A WEEK 7/7/22  Yes Osiel Hernandez DO   colestipol (COLESTID) 1 g tablet TAKE 2 TABLETS TWICE DAILY 5/23/22  Yes Osiel Hernandez DO   lisinopril (PRINIVIL;ZESTRIL) 10 MG tablet TAKE 1 TABLET EVERY DAY 5/16/22  Yes Osiel Hernandez DO   pantoprazole (PROTONIX) 40 MG tablet TAKE 1 TABLET EVERY DAY 5/16/22  Yes Osiel Hernandez DO   escitalopram (LEXAPRO) 20 MG tablet TAKE 1 TABLET EVERY DAY 5/16/22  Yes Osiel David, DO   atorvastatin (LIPITOR) 40 MG tablet TAKE 1 TABLET EVERY DAY 5/9/22  Yes Osiel Hernandez, DO   hydroxyurea (HYDREA) 500 MG chemo capsule Take 1 capsule by mouth daily 5/2/22  Yes Sarwat Sharma MD   ondansetron [facility]) 4 MG tablet Take 1 tablet by mouth 3 times daily as needed for Nausea or Vomiting 4/18/22  Yes Martha Pruitt, DO   oxybutynin (DITROPAN-XL) 10 MG extended release tablet  4/8/22  Yes Historical Provider, MD   vitamin B-12 (CYANOCOBALAMIN) 1000 MCG tablet Take 1,000 mcg by mouth daily   Yes Historical Provider, MD   Melatonin 5 MG CAPS Take 5 mg by mouth daily   Yes Historical Provider, MD   docusate sodium (COLACE) 100 MG capsule Take 100 mg by mouth 2 times daily   Yes Historical Provider, MD   nitrofurantoin, macrocrystal-monohydrate, (MACROBID) 100 MG capsule TAKE 1 CAPSULE EVERY DAY FOR 10 DAYS.  RESUME AFTER COMPLETING COURSE OF LEVAQUIN. 2/8/22  Yes Esa Morton, DO   Multiple Vitamins-Minerals (THERAPEUTIC MULTIVITAMIN-MINERALS) tablet Take 1 tablet by mouth daily   Yes Historical Provider, MD   aspirin 81 MG EC tablet Take 81 mg by mouth daily   Yes Historical Provider, MD   calcium carbonate (OSCAL) 500 MG TABS tablet Take 500 mg by mouth daily Indications: plus 10 mcg D3 Patient is taking 3 tablets daily   Yes Historical Provider, MD    Scheduled Meds:  Continuous Infusions:  PRN Meds:.        Recent Laboratory Data-     Lab Results   Component Value Date    WBC 7.0 06/03/2022    HGB 15.2 06/03/2022    HCT 45.5 06/03/2022    .5 (H) 06/03/2022     06/03/2022    LYMPHOPCT 11.3 (L) 06/03/2022    RBC 4.08 06/03/2022    MCH 37.3 (H) 06/03/2022    MCHC 33.4 06/03/2022    RDW 14.5 06/03/2022    NEUTOPHILPCT 80.0 06/03/2022    MONOPCT 2.6 06/03/2022    BASOPCT 1.7 06/03/2022    NEUTROABS 5.60 06/03/2022    LYMPHSABS 0.77 (L) 06/03/2022    MONOSABS 0.21 06/03/2022    EOSABS 0.31 06/03/2022    BASOSABS 0.12 06/03/2022       Lab Results   Component Value Date     06/03/2022    K 4.8 06/03/2022     06/03/2022    CO2 24 06/03/2022    BUN 20 06/03/2022    CREATININE 1.2 (H) 06/03/2022    GLUCOSE 144 (H) 06/03/2022    CALCIUM 9.4 06/03/2022    PROT 6.5 06/03/2022    LABALBU 4.2 06/03/2022    BILITOT 0.4 06/03/2022    ALKPHOS 63 06/03/2022    AST 23 06/03/2022    ALT 13 06/03/2022    LABGLOM 42 06/03/2022    GFRAA 51 06/03/2022         QUALITATIVE JAK2 MUTATION:  POSITIVE.      BCR/ABL1 Interphase FISH:  NEGATIVE          Radiology-  US RIGHT BREAST:  8/24/2020  1. 2 tiny cystic lesions including simple cyst at the 3 o'clock position and    mildly complex cyst at the 6 o'clock patient vision. Both of these measure 4    mm. 2. Otherwise, unremarkable right breast ultrasound and diagnostic mammogram.    Please note that a negative report should not delay biopsy if there is a    clinically suspicious palpable abnormality. Unless otherwise clinically    indicated, follow-up mammography and ultrasound of the right breast in 6    months is suggested. BIRADS:    BIRADS - CATEGORY 3         Findings are probably benign. A short interval follow-up is recommended in 6    months. OVERALL ASSESSMENT - PROBABLY BENIGN. DIGITAL AILIN RIGHT MAMMOGRAM: 8/24/2020  No radiographic evidence of malignancy. The asymmetric density seen on the    recent screening mammogram is not confirmed on the additional views. Subsequent ultrasound study only showed a couple of tiny cystic lesions with    no suspicious mass seen. Unless otherwise clinically indicated, follow-up    mammography and ultrasound of the right breast in 6 months is suggested. BIRADS:    BIRADS - CATEGORY 3         Findings are probably benign. A short interval follow-up is recommended in 6    months. OVERALL ASSESSMENT - PROBABLY BENIGN. BILATERAL SCREENING MAMMOGRAM:  8/04/2020.  1. Approximately 1 cm asymmetry seen within the medial aspect of the right    breast 4.7 cm from the nipple. The asymmetry is only seen on the CC view.     Dedicated diagnostic mammogram of the right mg twice daily      4/14/2021  CBC today reviewed and is in the normal range and no phlebotomy indicated. She is to continue on Hydrea 500 mg twice daily      9/15/2021  Tolerating Hydrea well. CBC shows leukopenia with a hemoglobin of 11.8 elevated MCV and a platelet count of 228    To decrease Hydrea dose to 500 mg daily  To have follow-up CBC in 6 weeks      10/27/21  Tolerating Hydrea well. Her dose had been decreased to 500 mg daily. Her CBC today shows a WBC count of 4.9 with a hemoglobin of 13.8  and normal platelet count of 866. CBC and CMP are unremarkable  She has CKD stable stage III. To continue present dose of Hydrea      1/31/2022  Continues on Hydrea 500 mg daily and tolerating it well  Her CBC today shows a hemoglobin of 14.8 with an MCV of 113 with normal WBC count of 6.5 and normal platelet count of 075  Renal function slightly improved. Patient with polycythemia vera. No phlebotomy warranted today. To continue Hydrea 500 mg p.o. daily    5/2/22  Feels fairly well and continues on Hydrea 500 mg daily without any side effects. Most recent CBC shows a hemoglobin of 15.7. She will be recommended therapeutic phlebotomy today and to 250 cc of blood will be withdrawn under medical supervision. To continue present dose of Hydrea. 6/6/2022  On Hydrea 500 mg daily. Hgb was 15.2 on 6/3/2022  She will be recommended therapeutic phlebotomy today and to 250 cc of blood will be withdrawn under medical supervision. To continue present dose of Hydrea. 7/25/2022    No change in her other meds. Her exam is unremarkable. No splenomegaly. She has been on Hydrea 500 mg daily. Most recent CBC shows a hemoglobin of 15.9 and elevated MCV in relation to Hydrea. She will undergo therapeutic phlebotomy today and 250 cc of blood will be withdrawn under medical supervision. To continue present dose of Hydrea. Idalia Shirley. Jones Rg M.D., F.A.C.P.   Electronically signed 7/25/2022 at 2:37 PM

## 2022-07-25 NOTE — PROGRESS NOTES
Patient did not want discharge instructions printed.   Verbalized understanding and what to look for post treatment

## 2022-07-25 NOTE — PROGRESS NOTES
Phlebotomy started and took off 250ml blood over [id number] with no complications. Patient tolerated procedure well. VS WNL. Cookie and soda offered. Patient sitting comfortably.

## 2022-07-25 NOTE — DISCHARGE INSTRUCTIONS
Blood Draw for Donation or Treatment: Care Instructions  Overview     You have just had some blood removed. This procedure is called phlebotomy (say\"[id number]-NIV-ioy-fer\"). People have their blood taken (drawn) for several reasons. You may have just donated blood so that it can be used to help someone else. Or you may have had blood removed to treat a medical condition, such as hemochromatosis or polycythemia. These take more blood than the sample that is needed for simple lab tests. For donation, about a pint of blood is drawn. If it's drawn fortreatment, then more or less than a pint may be taken. The puncture wound caused by the poke from the needle for giving blood usually heals without trouble. Most people feel fine after they give blood. But thereare some simple things you can do to take care of yourself before you go home. Right after you give blood, you may be asked to sit for a while and have some water or juice and a snack. When you leave, get up slowly to make sure that you're not lightheaded. You may want to have a family member or friend take you home. Follow-up care is a key part of your treatment and safety. Be sure to make and go to all appointments, and call your doctor if you are having problems. It's also a good idea to know your test results and keep alist of the medicines you take. How can you care for yourself at home? In the hours after you give blood, make sure to:  Drink plenty of fluids to help replace the lost fluid. If you have kidney, heart, or liver disease and have to limit fluids, talk with your doctor before you increase the amount of fluids you drink. Limit your physical activity for several hours. If you feel a little lightheaded, lie down for a while, and have some snacks. Call the blood bank or clinic if you feel sick within 24 hours after you give blood.   Eat foods rich in iron, such as meat, fish, beans, or leafy green vegetables, for several weeks to help your body make new red blood cells. When should you call for help? Call your doctor now or seek immediate medical care if:    You are dizzy or lightheaded or feel like you may faint. You have signs of infection, such as: Increased pain, swelling, warmth, or redness. Red streaks leading from the area. Pus draining from the area. A fever. Watch closely for changes in your health, and be sure to contact your doctor ifyou have any problems. Where can you learn more? Go to https://[employer]. org and sign in to your [employer] account. Enter C842 in the [facility] box to learn more about \"Blood Draw for Donation or Treatment: Care Instructions. \"     If you do not have an account, please click on the \"Sign Up Now\" link. Current as of: November 29, 2021               Content Version: 13.3  © 2006-2022 [employer]. Care instructions adapted under license by [facility] ([facility]). If you have questions about a medical condition or this instruction, always ask your healthcare professional. [address] any warranty or liability for your use of this information.

## 2022-09-02 DIAGNOSIS — N18.30 STAGE 3 CHRONIC KIDNEY DISEASE, UNSPECIFIED WHETHER STAGE 3A OR 3B CKD (HCC): ICD-10-CM

## 2022-09-02 DIAGNOSIS — D45 POLYCYTHEMIA VERA (HCC): Primary | ICD-10-CM

## 2022-09-02 DIAGNOSIS — D45 POLYCYTHEMIA VERA (HCC): ICD-10-CM

## 2022-09-02 LAB
ALBUMIN SERPL-MCNC: 4.2 G/DL (ref 3.5–5.2)
ALP BLD-CCNC: 59 U/L (ref 35–104)
ALT SERPL-CCNC: 13 U/L (ref 0–32)
ANION GAP SERPL CALCULATED.3IONS-SCNC: 11 MMOL/L (ref 7–16)
AST SERPL-CCNC: 20 U/L (ref 0–31)
BASOPHILS ABSOLUTE: 0.1 E9/L (ref 0–0.2)
BASOPHILS RELATIVE PERCENT: 1.2 % (ref 0–2)
BILIRUB SERPL-MCNC: 0.5 MG/DL (ref 0–1.2)
BUN BLDV-MCNC: 19 MG/DL (ref 6–23)
CALCIUM SERPL-MCNC: 9.9 MG/DL (ref 8.6–10.2)
CHLORIDE BLD-SCNC: 107 MMOL/L (ref 98–107)
CO2: 23 MMOL/L (ref 22–29)
CREAT SERPL-MCNC: 1.1 MG/DL (ref 0.5–1)
EOSINOPHILS ABSOLUTE: 0.11 E9/L (ref 0.05–0.5)
EOSINOPHILS RELATIVE PERCENT: 1.3 % (ref 0–6)
GFR AFRICAN AMERICAN: 57
GFR NON-AFRICAN AMERICAN: 47 ML/MIN/1.73
GLUCOSE BLD-MCNC: 103 MG/DL (ref 74–99)
HCT VFR BLD CALC: 49.9 % (ref 34–48)
HEMOGLOBIN: 16.2 G/DL (ref 11.5–15.5)
IMMATURE GRANULOCYTES #: 0.04 E9/L
IMMATURE GRANULOCYTES %: 0.5 % (ref 0–5)
LYMPHOCYTES ABSOLUTE: 0.91 E9/L (ref 1.5–4)
LYMPHOCYTES RELATIVE PERCENT: 11 % (ref 20–42)
MCH RBC QN AUTO: 34.8 PG (ref 26–35)
MCHC RBC AUTO-ENTMCNC: 32.5 % (ref 32–34.5)
MCV RBC AUTO: 107.1 FL (ref 80–99.9)
MONOCYTES ABSOLUTE: 0.21 E9/L (ref 0.1–0.95)
MONOCYTES RELATIVE PERCENT: 2.5 % (ref 2–12)
NEUTROPHILS ABSOLUTE: 6.87 E9/L (ref 1.8–7.3)
NEUTROPHILS RELATIVE PERCENT: 83.5 % (ref 43–80)
PDW BLD-RTO: 14.4 FL (ref 11.5–15)
PLATELET # BLD: 273 E9/L (ref 130–450)
PMV BLD AUTO: 10.9 FL (ref 7–12)
POTASSIUM SERPL-SCNC: 4.6 MMOL/L (ref 3.5–5)
RBC # BLD: 4.66 E12/L (ref 3.5–5.5)
SODIUM BLD-SCNC: 141 MMOL/L (ref 132–146)
TOTAL PROTEIN: 6.8 G/DL (ref 6.4–8.3)
WBC # BLD: 8.2 E9/L (ref 4.5–11.5)

## 2022-09-06 NOTE — PROGRESS NOTES
BMI 25.99 kg/m²    Physical Examination:   Performance Status: 0  Well developed, well nourished female  General: AAO to person, place, time, in no acute distress. Head and neck: PERRLA, EOMI . Sclera non icteric. Oropharynx: Clear. Neck: no JVD,  no adenopathy. Heart: Regular rate and regular rhythm. No murmur. Lungs: Clear to auscultation. Abdomen: Soft, non-tender;no masses, no organomegaly. Extremities: No edema. Neurologic:Cranial nerves grossly intact. No focal motor or sensory deficits. Skin:  No rash. LAB RESULTS:    Lab Results   Component Value Date    WBC 8.2 09/02/2022    HGB 16.2 (H) 09/02/2022    HCT 49.9 (H) 09/02/2022    .1 (H) 09/02/2022     09/02/2022     Lab Results   Component Value Date    ALT 13 09/02/2022    AST 20 09/02/2022    ALKPHOS 59 09/02/2022    BILITOT 0.5 09/02/2022     Lab Results   Component Value Date    LABALBU 4.2 09/02/2022               ASSESSMENT/PLAN :  Polycythemia with borderline neutrophilic leukocytosis. She has no clinical suggestion of reactive compensatory secondary polycythemia with absence of prior history of smoking COPD or sleep apnea     Rule out primary myeloproliferative disorder such as P vera. Rule out CML doubt     To check CBC, blood smear review, baseline EPO level, qualitative JAK2 mutation as well as BCR ABL by FISH. We will follow     Her EPO level was low at 1. Qualitative Jose Guadalupe 2 mutation was positive  BCR-ABL by FISH was negative     She has polycythemia vera  Hemoglobin was elevated at 17.9 on 3/9/2020     . Hemoglobin was elevated at 17.7 on 3/30/2020 and she underwent a  therapeutic phlebotomy and 250 cc of blood was removed under medical supervision. She was initiated on hydroxyurea 500 mg daily. Hemoglobin was elevated at 18.0  on 4/24/2020 and she underwent a  therapeutic phlebotomy and 250 cc of blood was  removed under medical supervision.   Her Hydrea was increased to 500 mg twice daily.  She was given PRN Zofran for nausea        Hgb was 16 on 5/27/2020 and she underwent phlebotomy with a target hemoglobin of 15  She will be maintained on Hydrea 500 mg twice daily. Hgb was 11.7  on  7/28/2020  Hydrea dose was decreased to 500 mg daily     Hgb was 12.7  on  8/25/2020  Hgb was 14.3  on  9/22/2020. No phlebotomy warranted to continue Hydrea 500 mg daily        11/09/2020  Hgb was 15.9; Hct was 45.5  on 10/27/2020  She is to undergo a  therapeutic phlebotomy and 250 cc of blood will be  removed under medical supervision with a target hemoglobin of 15. Her Hydrea dose will be increased back to 500 mg twice daily        4/14/2021  CBC today reviewed and is in the normal range and no phlebotomy indicated. She is to continue on Hydrea 500 mg twice daily        9/15/2021  Tolerating Hydrea well. CBC shows leukopenia with a hemoglobin of 11.8 elevated MCV and a platelet count of 321     To decrease Hydrea dose to 500 mg daily  To have follow-up CBC in 6 weeks        10/27/21  Tolerating Hydrea well. Her dose had been decreased to 500 mg daily. Her CBC today shows a WBC count of 4.9 with a hemoglobin of 13.8  and normal platelet count of 677. CBC and CMP are unremarkable  She has CKD stable stage III. To continue present dose of Hydrea        1/31/2022  Continues on Hydrea 500 mg daily and tolerating it well  Her CBC today shows a hemoglobin of 14.8 with an MCV of 113 with normal WBC count of 6.5 and normal platelet count of 168  Renal function slightly improved. Patient with polycythemia vera. No phlebotomy warranted today. To continue Hydrea 500 mg p.o. daily     5/2/22  Feels fairly well and continues on Hydrea 500 mg daily without any side effects. Most recent CBC shows a hemoglobin of 15.7. She will be recommended therapeutic phlebotomy today and to 250 cc of blood will be withdrawn under medical supervision. To continue present dose of Hydrea.            6/6/2022  On Hydrea 500 mg daily. Hgb was 15.2 on 6/3/2022  She will be recommended therapeutic phlebotomy today and to 250 cc of blood will be withdrawn under medical supervision. To continue present dose of Hydrea. 7/25/2022  No change in her other meds. Her exam is unremarkable. No splenomegaly. She has been on Hydrea 500 mg daily. Most recent CBC shows a hemoglobin of 15.9 and elevated MCV in relation to Hydrea. She will undergo therapeutic phlebotomy today and 250 cc of blood will be withdrawn under medical supervision. To continue present dose of Hydrea. 9/7/2022  Hgb was 16.2 on 9/2/2022. She will undergo therapeutic phlebotomy today and 250 cc of blood will be withdrawn under medical supervision. To continue present dose of Hydrea. To return in 6 weeks for OV with Dr. Endy Heath, Labs and possible Phlebotomy.         Sherryle Ping, [address] [place] [address] Road:  [phone]

## 2022-09-07 ENCOUNTER — HOSPITAL ENCOUNTER (OUTPATIENT)
Dept: INFUSION THERAPY | Age: 87
Discharge: HOME | End: 2022-09-07
Payer: MEDICARE

## 2022-09-07 ENCOUNTER — APPOINTMENT (OUTPATIENT)
Dept: INFUSION THERAPY | Age: 87
End: 2022-09-07
Payer: MEDICARE

## 2022-09-07 ENCOUNTER — OFFICE VISIT (OUTPATIENT)
Dept: ONCOLOGY | Age: 87
End: 2022-09-07

## 2022-09-07 VITALS
BODY MASS INDEX: 25.85 KG/M2 | TEMPERATURE: 99.2 F | WEIGHT: 151.4 LBS | SYSTOLIC BLOOD PRESSURE: 125 MMHG | HEIGHT: 64 IN | OXYGEN SATURATION: 98 % | DIASTOLIC BLOOD PRESSURE: 69 MMHG | HEART RATE: 85 BPM

## 2022-09-07 VITALS
DIASTOLIC BLOOD PRESSURE: 60 MMHG | SYSTOLIC BLOOD PRESSURE: 119 MMHG | HEART RATE: 81 BPM | TEMPERATURE: 98.4 F | RESPIRATION RATE: 16 BRPM | OXYGEN SATURATION: 97 %

## 2022-09-07 DIAGNOSIS — N18.30 STAGE 3 CHRONIC KIDNEY DISEASE, UNSPECIFIED WHETHER STAGE 3A OR 3B CKD (HCC): ICD-10-CM

## 2022-09-07 DIAGNOSIS — D45 POLYCYTHEMIA VERA (HCC): Primary | ICD-10-CM

## 2022-09-07 PROCEDURE — 99195 PHLEBOTOMY: CPT

## 2022-09-07 RX ORDER — 0.9 % SODIUM CHLORIDE 0.9 %
500 INTRAVENOUS SOLUTION INTRAVENOUS ONCE
Status: CANCELLED | OUTPATIENT
Start: 2022-09-07 | End: 2022-09-07

## 2022-09-07 RX ORDER — 0.9 % SODIUM CHLORIDE 0.9 %
250 INTRAVENOUS SOLUTION INTRAVENOUS ONCE
Status: CANCELLED | OUTPATIENT
Start: 2022-09-07 | End: 2022-09-07

## 2022-09-07 NOTE — PROGRESS NOTES
IV site initiated at [facility] with 20 G# intima. Therapeutic phlebotomy performed for 250 cc blood. Tolerated procedure and 15 minute observation without adverse reaction. Took fluids freely throughout observation.

## 2022-09-23 ENCOUNTER — TELEPHONE (OUTPATIENT)
Dept: PRIMARY CARE CLINIC | Age: 87
End: 2022-09-23

## 2022-09-23 DIAGNOSIS — Z12.31 ENCOUNTER FOR SCREENING MAMMOGRAM FOR MALIGNANT NEOPLASM OF BREAST: Primary | ICD-10-CM

## 2022-09-23 NOTE — TELEPHONE ENCOUNTER
----- Message from Malinda Kelly sent at 9/23/2022 11:43 AM EDT -----  Subject: Referral Request    Reason for referral request? mammogram  Provider patient wants to be referred to(if known):     Provider Phone Number(if known): Additional Information for Provider?  please call to schedule  ---------------------------------------------------------------------------  --------------  Michael Koole INFO    [phone]; OK to leave message on voicemail  ---------------------------------------------------------------------------  --------------

## 2022-10-06 DIAGNOSIS — E07.9 THYROID DISEASE: ICD-10-CM

## 2022-10-06 DIAGNOSIS — I10 HYPERTENSION, UNSPECIFIED TYPE: Primary | ICD-10-CM

## 2022-10-06 DIAGNOSIS — E78.5 HYPERLIPIDEMIA, UNSPECIFIED HYPERLIPIDEMIA TYPE: ICD-10-CM

## 2022-10-06 RX ORDER — ALENDRONATE SODIUM 70 MG/1
TABLET ORAL
Qty: 12 TABLET | Refills: 0 | Status: SHIPPED | OUTPATIENT
Start: 2022-10-06

## 2022-10-06 NOTE — TELEPHONE ENCOUNTER
1 refill sent to pharmacy. Technically the patient should be seen in our office every 6 months. She appears to be overdue for a lipid panel, TSH, vitamin D OH 25. Please contact the patient to schedule an appointment with a provider in this office.

## 2022-10-10 ENCOUNTER — HOSPITAL ENCOUNTER (OUTPATIENT)
Dept: MAMMOGRAPHY | Age: 87
Discharge: HOME | End: 2022-10-12
Payer: MEDICARE

## 2022-10-10 VITALS — BODY MASS INDEX: 24.75 KG/M2 | WEIGHT: 145 LBS | HEIGHT: 64 IN

## 2022-10-10 DIAGNOSIS — Z12.31 ENCOUNTER FOR SCREENING MAMMOGRAM FOR MALIGNANT NEOPLASM OF BREAST: ICD-10-CM

## 2022-10-10 PROCEDURE — 77067 SCR MAMMO BI INCL CAD: CPT

## 2022-10-19 ENCOUNTER — HOSPITAL ENCOUNTER (OUTPATIENT)
Dept: INFUSION THERAPY | Age: 87
Discharge: HOME | End: 2022-10-19
Payer: MEDICARE

## 2022-10-19 ENCOUNTER — OFFICE VISIT (OUTPATIENT)
Dept: ONCOLOGY | Age: 87
End: 2022-10-19

## 2022-10-19 VITALS
WEIGHT: 154.3 LBS | HEART RATE: 78 BPM | OXYGEN SATURATION: 97 % | HEIGHT: 64 IN | SYSTOLIC BLOOD PRESSURE: 152 MMHG | BODY MASS INDEX: 26.34 KG/M2 | TEMPERATURE: 97 F | DIASTOLIC BLOOD PRESSURE: 74 MMHG

## 2022-10-19 VITALS
TEMPERATURE: 98.5 F | OXYGEN SATURATION: 95 % | RESPIRATION RATE: 14 BRPM | HEART RATE: 88 BPM | DIASTOLIC BLOOD PRESSURE: 53 MMHG | SYSTOLIC BLOOD PRESSURE: 107 MMHG

## 2022-10-19 DIAGNOSIS — D45 POLYCYTHEMIA VERA (HCC): Primary | ICD-10-CM

## 2022-10-19 DIAGNOSIS — N18.30 STAGE 3 CHRONIC KIDNEY DISEASE, UNSPECIFIED WHETHER STAGE 3A OR 3B CKD (HCC): ICD-10-CM

## 2022-10-19 LAB
ALBUMIN SERPL-MCNC: 3.7 G/DL (ref 3.5–5.2)
ALP BLD-CCNC: 59 U/L (ref 35–104)
ALT SERPL-CCNC: 15 U/L (ref 0–32)
ANION GAP SERPL CALCULATED.3IONS-SCNC: 8 MMOL/L (ref 7–16)
AST SERPL-CCNC: 19 U/L (ref 0–31)
BASOPHILS ABSOLUTE: 0.1 E9/L (ref 0–0.2)
BASOPHILS RELATIVE PERCENT: 1.2 % (ref 0–2)
BILIRUB SERPL-MCNC: 0.6 MG/DL (ref 0–1.2)
BUN BLDV-MCNC: 16 MG/DL (ref 6–23)
CALCIUM SERPL-MCNC: 9.3 MG/DL (ref 8.6–10.2)
CHLORIDE BLD-SCNC: 103 MMOL/L (ref 98–107)
CO2: 24 MMOL/L (ref 22–29)
CREAT SERPL-MCNC: 1.1 MG/DL (ref 0.5–1)
EOSINOPHILS ABSOLUTE: 0.11 E9/L (ref 0.05–0.5)
EOSINOPHILS RELATIVE PERCENT: 1.3 % (ref 0–6)
GFR SERPL CREATININE-BSD FRML MDRD: 48 ML/MIN/1.73
GLUCOSE BLD-MCNC: 212 MG/DL (ref 74–99)
HCT VFR BLD CALC: 48.6 % (ref 34–48)
HEMOGLOBIN: 16.1 G/DL (ref 11.5–15.5)
IMMATURE GRANULOCYTES #: 0.04 E9/L
IMMATURE GRANULOCYTES %: 0.5 % (ref 0–5)
LYMPHOCYTES ABSOLUTE: 0.86 E9/L (ref 1.5–4)
LYMPHOCYTES RELATIVE PERCENT: 10 % (ref 20–42)
MCH RBC QN AUTO: 33.9 PG (ref 26–35)
MCHC RBC AUTO-ENTMCNC: 33.1 % (ref 32–34.5)
MCV RBC AUTO: 102.3 FL (ref 80–99.9)
MONOCYTES ABSOLUTE: 0.17 E9/L (ref 0.1–0.95)
MONOCYTES RELATIVE PERCENT: 2 % (ref 2–12)
NEUTROPHILS ABSOLUTE: 7.29 E9/L (ref 1.8–7.3)
NEUTROPHILS RELATIVE PERCENT: 85 % (ref 43–80)
PDW BLD-RTO: 15.4 FL (ref 11.5–15)
PLATELET # BLD: 250 E9/L (ref 130–450)
PMV BLD AUTO: 10.6 FL (ref 7–12)
POTASSIUM SERPL-SCNC: 4.6 MMOL/L (ref 3.5–5)
RBC # BLD: 4.75 E12/L (ref 3.5–5.5)
SODIUM BLD-SCNC: 135 MMOL/L (ref 132–146)
TOTAL PROTEIN: 6.4 G/DL (ref 6.4–8.3)
WBC # BLD: 8.6 E9/L (ref 4.5–11.5)

## 2022-10-19 PROCEDURE — 85025 COMPLETE CBC W/AUTO DIFF WBC: CPT

## 2022-10-19 PROCEDURE — 99195 PHLEBOTOMY: CPT

## 2022-10-19 PROCEDURE — 36415 COLL VENOUS BLD VENIPUNCTURE: CPT

## 2022-10-19 PROCEDURE — 80053 COMPREHEN METABOLIC PANEL: CPT

## 2022-10-19 RX ORDER — 0.9 % SODIUM CHLORIDE 0.9 %
250 INTRAVENOUS SOLUTION INTRAVENOUS ONCE
Status: CANCELLED | OUTPATIENT
Start: 2022-10-19 | End: 2022-10-19

## 2022-10-19 RX ORDER — 0.9 % SODIUM CHLORIDE 0.9 %
500 INTRAVENOUS SOLUTION INTRAVENOUS ONCE
Status: CANCELLED | OUTPATIENT
Start: 2022-10-19 | End: 2022-10-19

## 2022-10-19 RX ORDER — HYDROXYUREA 500 MG/1
500 CAPSULE ORAL 2 TIMES DAILY
Qty: 60 CAPSULE | Refills: 1 | Status: SHIPPED | OUTPATIENT
Start: 2022-10-19

## 2022-10-19 NOTE — PROGRESS NOTES
IV site initiated at [facility] with 20# intima. Therapeutic phlebotomy performed for 250 cc blood. Tolerated procedure and 15 minute observation without adverse reaction. Took fluids freely throughout observation. Verbal information provided to patient / family on procedure and post procedure care. Encouraged to call clinic during clinic hours and physician after clinic hours if questions or concerns arise.

## 2022-10-19 NOTE — PROGRESS NOTES
[address] [facility]. Taran Karimi, Damian Goldstein        Pt Name: Ciera Gutierrez  YOB: 1935  Date of evaluation: 10/19/2022  Primary Care Physician: Jonna Calderón DO  Reason for evaluation:   Chief Complaint   Patient presents with    Other     Polycythemia vera    Chronic Kidney Disease     Stage III    Follow-up        Subjective: Here for  follow up. Tolerating Hydrea well. OBJECTIVE:  VITALS:  height is 5' 4\" (1.626 m) and weight is 154 lb 4.8 oz (70 kg). Her temperature is 97 °F (36.1 °C). Her blood pressure is 152/74 (abnormal) and her pulse is 78. Her oxygen saturation is 97%. Physical Exam:  Performance Status: 0  Well developed, well nourished female  General: AAO to person, place, time, in no acute distress. Head and neck: PERRLA, EOMI . Sclera non icteric. Oropharynx: Clear. Neck: no JVD,  no adenopathy. Heart: Regular rate and regular rhythm. No murmur. Lungs: Clear to auscultation. Abdomen: Soft, non-tender;no masses, no organomegaly. Extremities: No edema,no cyanosis, no clubbing. Neurologic:Cranial nerves grossly intact. No focal motor or sensory deficits. Skin:  No rash. Medications  Prior to Admission medications    Medication Sig Start Date End Date Taking?  Authorizing Provider   alendronate (FOSAMAX) 70 MG tablet TAKE 1 TABLET BY MOUTH 1 TIME A WEEK 10/6/22   Jonna Calderón,    hydroxyurea (HYDREA) 500 MG chemo capsule Take 1 capsule by mouth in the morning. 7/25/22   Junie Bae MD   colestipol (COLESTID) 1 g tablet TAKE 2 TABLETS TWICE DAILY 5/23/22   Jonna Calderón, DO   lisinopril (PRINIVIL;ZESTRIL) 10 MG tablet TAKE 1 TABLET EVERY DAY 5/16/22   Jonna Calderón, DO   pantoprazole (PROTONIX) 40 MG tablet TAKE 1 TABLET EVERY DAY 5/16/22   Jonna Calderón, DO   escitalopram (LEXAPRO) 20 MG tablet TAKE 1 TABLET EVERY DAY 5/16/22   Jonna Perdomoes, DO   atorvastatin (LIPITOR) 40 MG tablet TAKE 1 TABLET EVERY DAY 5/9/22   Jonna Calderón, DO   ondansetron (ZOFRAN) 4 MG tablet Take 1 tablet by mouth 3 times daily as needed for Nausea or Vomiting 4/18/22   Rodrigo Pruitt, DO   oxybutynin (DITROPAN-XL) 10 MG extended release tablet  4/8/22   Historical Provider, MD   vitamin B-12 (CYANOCOBALAMIN) 1000 MCG tablet Take 1,000 mcg by mouth daily    Historical Provider, MD   Melatonin 5 MG CAPS Take 5 mg by mouth daily    Historical Provider, MD   docusate sodium (COLACE) 100 MG capsule Take 100 mg by mouth 2 times daily    Historical Provider, MD   nitrofurantoin, macrocrystal-monohydrate, (MACROBID) 100 MG capsule TAKE 1 CAPSULE EVERY DAY FOR 10 DAYS.  RESUME AFTER COMPLETING COURSE OF LEVAQUIN. 2/8/22   Hu Sawyer, DO   Multiple Vitamins-Minerals (THERAPEUTIC MULTIVITAMIN-MINERALS) tablet Take 1 tablet by mouth daily    Historical Provider, MD   aspirin 81 MG EC tablet Take 81 mg by mouth daily    Historical Provider, MD   calcium carbonate (OSCAL) 500 MG TABS tablet Take 500 mg by mouth daily Indications: plus 10 mcg D3 Patient is taking 3 tablets daily    Historical Provider, MD    Scheduled Meds:  Continuous Infusions:  PRN Meds:.        Recent Laboratory Data-     Lab Results   Component Value Date    WBC 8.6 10/19/2022    HGB 16.1 (H) 10/19/2022    HCT 48.6 (H) 10/19/2022    .3 (H) 10/19/2022     10/19/2022    LYMPHOPCT 10.0 (L) 10/19/2022    RBC 4.75 10/19/2022    MCH 33.9 10/19/2022    MCHC 33.1 10/19/2022    RDW 15.4 (H) 10/19/2022    NEUTOPHILPCT 85.0 (H) 10/19/2022    MONOPCT 2.0 10/19/2022    BASOPCT 1.2 10/19/2022    NEUTROABS 7.29 10/19/2022    LYMPHSABS 0.86 (L) 10/19/2022    MONOSABS 0.17 10/19/2022    EOSABS 0.11 10/19/2022    BASOSABS 0.10 10/19/2022       Lab Results   Component Value Date     10/19/2022    K 4.6 10/19/2022     10/19/2022    CO2 24 10/19/2022    BUN 16 10/19/2022    CREATININE 1.1 (H) 10/19/2022    GLUCOSE 212 (H) 10/19/2022    CALCIUM 9.3 10/19/2022    PROT 6.4 10/19/2022    LABALBU 3.7 10/19/2022    BILITOT 0.6 10/19/2022    ALKPHOS 59 10/19/2022    AST 19 10/19/2022    ALT 15 10/19/2022    LABGLOM 48 10/19/2022    GFRAA 57 09/02/2022         QUALITATIVE JAK2 MUTATION:  POSITIVE.      BCR/ABL1 Interphase FISH:  NEGATIVE          Radiology-  US RIGHT BREAST:  8/24/2020  1. 2 tiny cystic lesions including simple cyst at the 3 o'clock position and    mildly complex cyst at the 6 o'clock patient vision. Both of these measure 4    mm. 2. Otherwise, unremarkable right breast ultrasound and diagnostic mammogram.    Please note that a negative report should not delay biopsy if there is a    clinically suspicious palpable abnormality. Unless otherwise clinically    indicated, follow-up mammography and ultrasound of the right breast in 6    months is suggested. BIRADS:    BIRADS - CATEGORY 3         Findings are probably benign. A short interval follow-up is recommended in 6    months. OVERALL ASSESSMENT - PROBABLY BENIGN. DIGITAL AILIN RIGHT MAMMOGRAM: 8/24/2020  No radiographic evidence of malignancy. The asymmetric density seen on the    recent screening mammogram is not confirmed on the additional views. Subsequent ultrasound study only showed a couple of tiny cystic lesions with    no suspicious mass seen. Unless otherwise clinically indicated, follow-up    mammography and ultrasound of the right breast in 6 months is suggested. BIRADS:    BIRADS - CATEGORY 3         Findings are probably benign. A short interval follow-up is recommended in 6    months. OVERALL ASSESSMENT - PROBABLY BENIGN. BILATERAL SCREENING MAMMOGRAM:  8/04/2020.  1. Approximately 1 cm asymmetry seen within the medial aspect of the right    breast 4.7 cm from the nipple. The asymmetry is only seen on the CC view. Dedicated diagnostic mammogram of the right breast is recommended.     2. Stable mammogram of the left breast.  Annual screening mammography left    breast is recommended. BIRADS:    BIRADS - CATEGORY 0         Additional imaging is recommended at this time. OVERALL ASSESSMENT - INCOMPLETE:NEED ADDITIONAL IMAGING EVALUATION. ASSESSMENT/PLAN :  Polycythemia with borderline neutrophilic leukocytosis. She has no clinical suggestion of reactive compensatory secondary polycythemia with absence of prior history of smoking COPD or sleep apnea     Rule out primary myeloproliferative disorder such as P vera. Rule out CML doubt     To check CBC, blood smear review, baseline EPO level, qualitative JAK2 mutation as well as BCR ABL by FISH. We will follow    Her EPO level was low at 1. Qualitative Jose Guadalupe 2 mutation was positive  BCR-ABL by FISH was negative    She has polycythemia vera  Hemoglobin was elevated at 17.9 on 3/9/2020    . Hemoglobin was elevated at 17.7 on 3/30/2020 and she underwent a  therapeutic phlebotomy and 250 cc of blood was removed under medical supervision. She was initiated on hydroxyurea 500 mg daily. Hemoglobin was elevated at 18.0  on 4/24/2020 and she underwent a  therapeutic phlebotomy and 250 cc of blood was  removed under medical supervision. Her Hydrea was increased to 500 mg twice daily. She was given PRN Zofran for nausea      Hgb was 16 on 5/27/2020 and she underwent phlebotomy with a target hemoglobin of 15  She will be maintained on Hydrea 500 mg twice daily. Hgb was 11.7  on  7/28/2020  Hydrea dose was decreased to 500 mg daily    Hgb was 12.7  on  8/25/2020  Hgb was 14.3  on  9/22/2020. No phlebotomy warranted to continue Hydrea 500 mg daily      11/09/2020  Hgb was 15.9; Hct was 45.5  on 10/27/2020  She is to undergo a  therapeutic phlebotomy and 250 cc of blood will be  removed under medical supervision with a target hemoglobin of 15.   Her Hydrea dose will be increased back to 500 mg twice daily      4/14/2021  CBC today reviewed and is in the normal range and no phlebotomy of Hydrea. To return in 6 weeks for OV with Dr. Yashira Ballesteros and possible Phlebotomy      10/19/2022  Hgb is 16.1    She will undergo therapeutic phlebotomy today and 250 cc of blood will be withdrawn under medical supervision. Exam is negative. No splenomegaly. Her hydroxyurea dose to be increased to 500 mg twice daily. Shona Fountain. Leo Ramirez M.D., F.A.C.P.   Electronically signed 10/19/2022 at 1:14 PM

## 2022-11-29 DIAGNOSIS — D45 POLYCYTHEMIA VERA (HCC): ICD-10-CM

## 2022-11-29 LAB
ALBUMIN SERPL-MCNC: 4.1 G/DL (ref 3.5–5.2)
ALP BLD-CCNC: 72 U/L (ref 35–104)
ALT SERPL-CCNC: 20 U/L (ref 0–32)
ANION GAP SERPL CALCULATED.3IONS-SCNC: 15 MMOL/L (ref 7–16)
AST SERPL-CCNC: 24 U/L (ref 0–31)
BASOPHILS ABSOLUTE: 0.09 E9/L (ref 0–0.2)
BASOPHILS RELATIVE PERCENT: 1.3 % (ref 0–2)
BILIRUB SERPL-MCNC: 0.7 MG/DL (ref 0–1.2)
BUN BLDV-MCNC: 19 MG/DL (ref 6–23)
CALCIUM SERPL-MCNC: 10.3 MG/DL (ref 8.6–10.2)
CHLORIDE BLD-SCNC: 100 MMOL/L (ref 98–107)
CO2: 22 MMOL/L (ref 22–29)
CREAT SERPL-MCNC: 1.1 MG/DL (ref 0.5–1)
EOSINOPHILS ABSOLUTE: 0.08 E9/L (ref 0.05–0.5)
EOSINOPHILS RELATIVE PERCENT: 1.1 % (ref 0–6)
GFR SERPL CREATININE-BSD FRML MDRD: 48 ML/MIN/1.73
GLUCOSE BLD-MCNC: 126 MG/DL (ref 74–99)
HCT VFR BLD CALC: 45.1 % (ref 34–48)
HEMOGLOBIN: 15.3 G/DL (ref 11.5–15.5)
IMMATURE GRANULOCYTES #: 0.04 E9/L
IMMATURE GRANULOCYTES %: 0.6 % (ref 0–5)
LYMPHOCYTES ABSOLUTE: 0.84 E9/L (ref 1.5–4)
LYMPHOCYTES RELATIVE PERCENT: 11.9 % (ref 20–42)
MCH RBC QN AUTO: 36.3 PG (ref 26–35)
MCHC RBC AUTO-ENTMCNC: 33.9 % (ref 32–34.5)
MCV RBC AUTO: 106.9 FL (ref 80–99.9)
MONOCYTES ABSOLUTE: 0.18 E9/L (ref 0.1–0.95)
MONOCYTES RELATIVE PERCENT: 2.6 % (ref 2–12)
NEUTROPHILS ABSOLUTE: 5.81 E9/L (ref 1.8–7.3)
NEUTROPHILS RELATIVE PERCENT: 82.5 % (ref 43–80)
PDW BLD-RTO: 19 FL (ref 11.5–15)
PLATELET # BLD: 171 E9/L (ref 130–450)
PMV BLD AUTO: 10.9 FL (ref 7–12)
POTASSIUM SERPL-SCNC: 4.3 MMOL/L (ref 3.5–5)
RBC # BLD: 4.22 E12/L (ref 3.5–5.5)
SODIUM BLD-SCNC: 137 MMOL/L (ref 132–146)
TOTAL PROTEIN: 6.9 G/DL (ref 6.4–8.3)
WBC # BLD: 7 E9/L (ref 4.5–11.5)

## 2022-11-30 ENCOUNTER — OFFICE VISIT (OUTPATIENT)
Dept: ONCOLOGY | Age: 87
End: 2022-11-30

## 2022-11-30 ENCOUNTER — HOSPITAL ENCOUNTER (OUTPATIENT)
Dept: INFUSION THERAPY | Age: 87
Discharge: HOME | End: 2022-11-30
Payer: MEDICARE

## 2022-11-30 VITALS
RESPIRATION RATE: 20 BRPM | SYSTOLIC BLOOD PRESSURE: 137 MMHG | TEMPERATURE: 96.2 F | DIASTOLIC BLOOD PRESSURE: 70 MMHG | HEART RATE: 82 BPM | OXYGEN SATURATION: 98 %

## 2022-11-30 VITALS
SYSTOLIC BLOOD PRESSURE: 164 MMHG | HEIGHT: 64 IN | OXYGEN SATURATION: 97 % | HEART RATE: 89 BPM | WEIGHT: 156.5 LBS | BODY MASS INDEX: 26.72 KG/M2 | DIASTOLIC BLOOD PRESSURE: 74 MMHG | TEMPERATURE: 97.1 F

## 2022-11-30 DIAGNOSIS — N18.30 STAGE 3 CHRONIC KIDNEY DISEASE, UNSPECIFIED WHETHER STAGE 3A OR 3B CKD (HCC): ICD-10-CM

## 2022-11-30 DIAGNOSIS — D45 POLYCYTHEMIA VERA (HCC): Primary | ICD-10-CM

## 2022-11-30 PROCEDURE — 99195 PHLEBOTOMY: CPT

## 2022-11-30 RX ORDER — 0.9 % SODIUM CHLORIDE 0.9 %
250 INTRAVENOUS SOLUTION INTRAVENOUS ONCE
OUTPATIENT
Start: 2022-11-30 | End: 2022-11-30

## 2022-11-30 RX ORDER — HYDROXYUREA 500 MG/1
500 CAPSULE ORAL 2 TIMES DAILY
Qty: 60 CAPSULE | Refills: 1 | Status: SHIPPED | OUTPATIENT
Start: 2022-11-30

## 2022-11-30 RX ORDER — 0.9 % SODIUM CHLORIDE 0.9 %
500 INTRAVENOUS SOLUTION INTRAVENOUS ONCE
OUTPATIENT
Start: 2022-11-30 | End: 2022-11-30

## 2022-11-30 NOTE — PROGRESS NOTES
IV site initiated at [facility] with 18G# intima. Therapeutic phlebotomy performed for 250 cc blood. Tolerated procedure and 15 minute observation without adverse reaction. Took fluids freely throughout observation. Vitals: documented in flowsheets      Verbal information provided to patient / family on procedure and post procedure care. Encouraged to call clinic during clinic hours and physician after clinic hours if questions or concerns arise.

## 2022-11-30 NOTE — PROGRESS NOTES
[address] [facility]. [facility] [place]        Pt Name: Ladonna Olea  YOB: 1935  Date of evaluation: 11/30/2022  Primary Care Physician: Maritza Norton DO  Reason for evaluation:   Chief Complaint   Patient presents with    Other     Polycythemia vera    Follow-up        Subjective: Here for  follow up. Tolerating Hydrea well. OBJECTIVE:  VITALS:  height is 5' 4\" (1.626 m) and weight is 156 lb 8 oz (71 kg). Her temperature is 97.1 °F (36.2 °C). Her blood pressure is 164/74 (abnormal) and her pulse is 89. Her oxygen saturation is 97%. Physical Exam:  Performance Status: 0  Well developed, well nourished female  General: AAO to person, place, time, in no acute distress. Head and neck: PERRLA, EOMI . Sclera non icteric. Oropharynx: Clear. Neck: no JVD,  no adenopathy. Heart: Regular rate and regular rhythm. No murmur. Lungs: Clear to auscultation. Abdomen: Soft, non-tender;no masses, no organomegaly. Extremities: No edema,no cyanosis, no clubbing. Neurologic:Cranial nerves grossly intact. No focal motor or sensory deficits. Skin:  No rash. Medications  Prior to Admission medications    Medication Sig Start Date End Date Taking?  Authorizing Provider   hydroxyurea (HYDREA) 500 MG chemo capsule Take 1 capsule by mouth 2 times daily 10/19/22   Melanie Blas MD   alendronate (FOSAMAX) 70 MG tablet TAKE 1 TABLET BY MOUTH 1 TIME A WEEK 10/6/22   Maritza Norton DO   hydroxyurea (HYDREA) 500 MG chemo capsule Take 1 capsule by mouth in the morning. 7/25/22   Melnaie Blas MD   colestipol (COLESTID) 1 g tablet TAKE 2 TABLETS TWICE DAILY 5/23/22   Maritza Norton DO   lisinopril (PRINIVIL;ZESTRIL) 10 MG tablet TAKE 1 TABLET EVERY DAY 5/16/22   Maritza Norton DO   pantoprazole (PROTONIX) 40 MG tablet TAKE 1 TABLET EVERY DAY 5/16/22   Maritza Norton DO   escitalopram (LEXAPRO) 20 MG tablet TAKE 1 TABLET EVERY DAY 5/16/22   Maritza Norton DO atorvastatin (LIPITOR) 40 MG tablet TAKE 1 TABLET EVERY DAY 5/9/22   Amada Suarez DO   ondansetron (ZOFRAN) 4 MG tablet Take 1 tablet by mouth 3 times daily as needed for Nausea or Vomiting 4/18/22   Dimple Pruitt, DO   oxybutynin (DITROPAN-XL) 10 MG extended release tablet  4/8/22   Historical Provider, MD   vitamin B-12 (CYANOCOBALAMIN) 1000 MCG tablet Take 1,000 mcg by mouth daily    Historical Provider, MD   Melatonin 5 MG CAPS Take 5 mg by mouth daily    Historical Provider, MD   docusate sodium (COLACE) 100 MG capsule Take 100 mg by mouth 2 times daily    Historical Provider, MD   nitrofurantoin, macrocrystal-monohydrate, (MACROBID) 100 MG capsule TAKE 1 CAPSULE EVERY DAY FOR 10 DAYS.  RESUME AFTER COMPLETING COURSE OF LEVAQUIN. 2/8/22   Amada Suarez DO   Multiple Vitamins-Minerals (THERAPEUTIC MULTIVITAMIN-MINERALS) tablet Take 1 tablet by mouth daily    Historical Provider, MD   aspirin 81 MG EC tablet Take 81 mg by mouth daily    Historical Provider, MD   calcium carbonate (OSCAL) 500 MG TABS tablet Take 500 mg by mouth daily Indications: plus 10 mcg D3 Patient is taking 3 tablets daily    Historical Provider, MD    Scheduled Meds:  Continuous Infusions:  PRN Meds:.        Recent Laboratory Data-     Lab Results   Component Value Date    WBC 7.0 11/29/2022    HGB 15.3 11/29/2022    HCT 45.1 11/29/2022    .9 (H) 11/29/2022     11/29/2022    LYMPHOPCT 11.9 (L) 11/29/2022    RBC 4.22 11/29/2022    MCH 36.3 (H) 11/29/2022    MCHC 33.9 11/29/2022    RDW 19.0 (H) 11/29/2022    NEUTOPHILPCT 82.5 (H) 11/29/2022    MONOPCT 2.6 11/29/2022    BASOPCT 1.3 11/29/2022    NEUTROABS 5.81 11/29/2022    LYMPHSABS 0.84 (L) 11/29/2022    MONOSABS 0.18 11/29/2022    EOSABS 0.08 11/29/2022    BASOSABS 0.09 11/29/2022       Lab Results   Component Value Date     11/29/2022    K 4.3 11/29/2022     11/29/2022    CO2 22 11/29/2022    BUN 19 11/29/2022    CREATININE 1.1 (H) 11/29/2022 GLUCOSE 126 (H) 11/29/2022    CALCIUM 10.3 (H) 11/29/2022    PROT 6.9 11/29/2022    LABALBU 4.1 11/29/2022    BILITOT 0.7 11/29/2022    ALKPHOS 72 11/29/2022    AST 24 11/29/2022    ALT 20 11/29/2022    LABGLOM 48 11/29/2022    GFRAA 57 09/02/2022         QUALITATIVE JAK2 MUTATION:  POSITIVE.      BCR/ABL1 Interphase FISH:  NEGATIVE          Radiology-  US RIGHT BREAST:  8/24/2020  1. 2 tiny cystic lesions including simple cyst at the 3 o'clock position and    mildly complex cyst at the 6 o'clock patient vision. Both of these measure 4    mm. 2. Otherwise, unremarkable right breast ultrasound and diagnostic mammogram.    Please note that a negative report should not delay biopsy if there is a    clinically suspicious palpable abnormality. Unless otherwise clinically    indicated, follow-up mammography and ultrasound of the right breast in 6    months is suggested. BIRADS:    BIRADS - CATEGORY 3         Findings are probably benign. A short interval follow-up is recommended in 6    months. OVERALL ASSESSMENT - PROBABLY BENIGN. DIGITAL AILIN RIGHT MAMMOGRAM: 8/24/2020  No radiographic evidence of malignancy. The asymmetric density seen on the    recent screening mammogram is not confirmed on the additional views. Subsequent ultrasound study only showed a couple of tiny cystic lesions with    no suspicious mass seen. Unless otherwise clinically indicated, follow-up    mammography and ultrasound of the right breast in 6 months is suggested. BIRADS:    BIRADS - CATEGORY 3         Findings are probably benign. A short interval follow-up is recommended in 6    months. OVERALL ASSESSMENT - PROBABLY BENIGN. BILATERAL SCREENING MAMMOGRAM:  8/04/2020.  1. Approximately 1 cm asymmetry seen within the medial aspect of the right    breast 4.7 cm from the nipple. The asymmetry is only seen on the CC view.     Dedicated diagnostic mammogram of the right breast is recommended. 2. Stable mammogram of the left breast.  Annual screening mammography left    breast is recommended. BIRADS:    BIRADS - CATEGORY 0         Additional imaging is recommended at this time. OVERALL ASSESSMENT - INCOMPLETE:NEED ADDITIONAL IMAGING EVALUATION. ASSESSMENT/PLAN :  Polycythemia with borderline neutrophilic leukocytosis. She has no clinical suggestion of reactive compensatory secondary polycythemia with absence of prior history of smoking COPD or sleep apnea     Rule out primary myeloproliferative disorder such as P vera. Rule out CML doubt     To check CBC, blood smear review, baseline EPO level, qualitative JAK2 mutation as well as BCR ABL by FISH. We will follow    Her EPO level was low at 1. Qualitative Jose Guadalupe 2 mutation was positive  BCR-ABL by FISH was negative    She has polycythemia vera  Hemoglobin was elevated at 17.9 on 3/9/2020    . Hemoglobin was elevated at 17.7 on 3/30/2020 and she underwent a  therapeutic phlebotomy and 250 cc of blood was removed under medical supervision. She was initiated on hydroxyurea 500 mg daily. Hemoglobin was elevated at 18.0  on 4/24/2020 and she underwent a  therapeutic phlebotomy and 250 cc of blood was  removed under medical supervision. Her Hydrea was increased to 500 mg twice daily. She was given PRN Zofran for nausea      Hgb was 16 on 5/27/2020 and she underwent phlebotomy with a target hemoglobin of 15  She will be maintained on Hydrea 500 mg twice daily. Hgb was 11.7  on  7/28/2020  Hydrea dose was decreased to 500 mg daily    Hgb was 12.7  on  8/25/2020  Hgb was 14.3  on  9/22/2020. No phlebotomy warranted to continue Hydrea 500 mg daily      11/09/2020  Hgb was 15.9; Hct was 45.5  on 10/27/2020  She is to undergo a  therapeutic phlebotomy and 250 cc of blood will be  removed under medical supervision with a target hemoglobin of 15.   Her Hydrea dose will be increased back to 500 mg twice daily      4/14/2021  CBC today reviewed and is in the normal range and no phlebotomy indicated. She is to continue on Hydrea 500 mg twice daily      9/15/2021  Tolerating Hydrea well. CBC shows leukopenia with a hemoglobin of 11.8 elevated MCV and a platelet count of 678    To decrease Hydrea dose to 500 mg daily  To have follow-up CBC in 6 weeks      10/27/21  Tolerating Hydrea well. Her dose had been decreased to 500 mg daily. Her CBC today shows a WBC count of 4.9 with a hemoglobin of 13.8  and normal platelet count of 522. CBC and CMP are unremarkable  She has CKD stable stage III. To continue present dose of Hydrea      1/31/2022  Continues on Hydrea 500 mg daily and tolerating it well  Her CBC today shows a hemoglobin of 14.8 with an MCV of 113 with normal WBC count of 6.5 and normal platelet count of 041  Renal function slightly improved. Patient with polycythemia vera. No phlebotomy warranted today. To continue Hydrea 500 mg p.o. daily    5/2/22  Feels fairly well and continues on Hydrea 500 mg daily without any side effects. Most recent CBC shows a hemoglobin of 15.7. She will be recommended therapeutic phlebotomy today and to 250 cc of blood will be withdrawn under medical supervision. To continue present dose of Hydrea. 6/6/2022  On Hydrea 500 mg daily. Hgb was 15.2 on 6/3/2022  She will be recommended therapeutic phlebotomy today and to 250 cc of blood will be withdrawn under medical supervision. To continue present dose of Hydrea. 7/25/2022  No change in her other meds. Her exam is unremarkable. No splenomegaly. She has been on Hydrea 500 mg daily. Most recent CBC shows a hemoglobin of 15.9 and elevated MCV in relation to Hydrea. She will undergo therapeutic phlebotomy today and 250 cc of blood will be withdrawn under medical supervision. To continue present dose of Hydrea. 9/7/2022  Hgb was 16.2 on 9/2/2022.   She will undergo therapeutic phlebotomy today and 250 cc of blood will be withdrawn under medical supervision. To continue present dose of Hydrea. To return in 6 weeks for OV with Dr. Rebekah Turcios and possible Phlebotomy      10/19/2022  Hgb is 16.1    She will undergo therapeutic phlebotomy today and 250 cc of blood will be withdrawn under medical supervision. Exam is negative. No splenomegaly. Her hydroxyurea dose to be increased to 500 mg twice daily. 11/30/2022  Doing well. No change in medications. Her hydroxyurea dose was increased to 500 mg twice daily. She required phlebotomy back in October. Her hemoglobin is improved and down to 15.3. She is to undergo today and additional therapeutic phlebotomy and 250 cc of blood will be drawn under medical supervision. She will be maintained on Hydrea 500 mg twice daily        On Hydrea 500 mg BID        William Atwood. Nilsa Avila M.D., F.A.C.P.   Electronically signed 11/30/2022 at 12:20 PM

## 2022-12-05 ENCOUNTER — TELEPHONE (OUTPATIENT)
Dept: PRIMARY CARE CLINIC | Age: 87
End: 2022-12-05

## 2022-12-05 ENCOUNTER — NURSE ONLY (OUTPATIENT)
Dept: PRIMARY CARE CLINIC | Age: 87
End: 2022-12-05
Payer: MEDICARE

## 2022-12-05 DIAGNOSIS — N39.0 URINARY TRACT INFECTION WITHOUT HEMATURIA, SITE UNSPECIFIED: Primary | ICD-10-CM

## 2022-12-05 DIAGNOSIS — N39.0 URINARY TRACT INFECTION WITHOUT HEMATURIA, SITE UNSPECIFIED: ICD-10-CM

## 2022-12-05 LAB
BILIRUBIN, POC: NEGATIVE
BLOOD URINE, POC: NEGATIVE
CLARITY, POC: CLEAR
COLOR, POC: NORMAL
GLUCOSE URINE, POC: NEGATIVE
KETONES, POC: NEGATIVE
LEUKOCYTE EST, POC: NEGATIVE
NITRITE, POC: NEGATIVE
PH, POC: 5
PROTEIN, POC: NEGATIVE
SPECIFIC GRAVITY, POC: 1.02
UROBILINOGEN, POC: 0.2

## 2022-12-05 PROCEDURE — 81002 URINALYSIS NONAUTO W/O SCOPE: CPT | Performed by: FAMILY MEDICINE

## 2022-12-05 NOTE — TELEPHONE ENCOUNTER
Spoke with pt, she stated she is still taking the Nitrofurantonin daily. Pt stated if anything is sent in would like it to go to [facility] on [address].

## 2022-12-05 NOTE — TELEPHONE ENCOUNTER
Urinalysis appears to be unremarkable. I would await culture and sensitivity reports prior to prescribing an antibiotic. Did patient schedule a follow-up appointment?

## 2022-12-05 NOTE — TELEPHONE ENCOUNTER
Patient called this morning stating she believes she has a UTI. She is very vague with symptoms stating she just \"feels yucky\". States she took a home test that indicated UTI. Advised patient needs to stop by the office and leave a urine sample. She will have son stop by and  a container. She is 80years old and has difficulty getting out.

## 2022-12-05 NOTE — TELEPHONE ENCOUNTER
Please contact patient. Last seen April 2022. Needs to schedule follow-up appointment. Labs were ordered November 2022 and patient needs to obtain these. Check to see if she is still on nitrofurantoin 100 mg daily to prevent UTIs.

## 2022-12-05 NOTE — TELEPHONE ENCOUNTER
Patient called this morning stating she believes she has a UTI. She is very vague with symptoms stating she just \"feels yucky\". States she took a home test that indicated UTI. Son dropped off urine sample. Please see urine results .

## 2022-12-06 NOTE — TELEPHONE ENCOUNTER
Spoke with pt, notifying her that Dr will await culture results before prescribing any antibiotics.   Appt scheduled with Tone Quinn on 12/9/22 @ 230pm.

## 2022-12-08 LAB — URINE CULTURE, ROUTINE: NORMAL

## 2022-12-20 RX ORDER — ALENDRONATE SODIUM 70 MG/1
TABLET ORAL
Qty: 12 TABLET | Refills: 0 | Status: SHIPPED | OUTPATIENT
Start: 2022-12-20

## 2022-12-20 NOTE — TELEPHONE ENCOUNTER
Pc to pt informed refill sent to the pharmacy. Pt needs to obtain labs which were ordered 11/20/22. She has an appt 4/3/23

## 2022-12-20 NOTE — TELEPHONE ENCOUNTER
1 refill sent to pharmacy. Patient needs to obtain labs ordered 11/20/2022. Needs to schedule a follow-up appointment.

## 2023-01-06 DIAGNOSIS — I10 HYPERTENSION, UNSPECIFIED TYPE: ICD-10-CM

## 2023-01-06 DIAGNOSIS — E07.9 THYROID DISEASE: ICD-10-CM

## 2023-01-06 DIAGNOSIS — D45 POLYCYTHEMIA VERA (HCC): ICD-10-CM

## 2023-01-06 DIAGNOSIS — E78.5 HYPERLIPIDEMIA, UNSPECIFIED HYPERLIPIDEMIA TYPE: ICD-10-CM

## 2023-01-06 LAB
ALBUMIN SERPL-MCNC: 4.1 G/DL (ref 3.5–5.2)
ALP BLD-CCNC: 62 U/L (ref 35–104)
ALT SERPL-CCNC: 13 U/L (ref 0–32)
ANION GAP SERPL CALCULATED.3IONS-SCNC: 12 MMOL/L (ref 7–16)
ANISOCYTOSIS: ABNORMAL
AST SERPL-CCNC: 20 U/L (ref 0–31)
BASOPHILS ABSOLUTE: 0.06 E9/L (ref 0–0.2)
BASOPHILS RELATIVE PERCENT: 0.9 % (ref 0–2)
BILIRUB SERPL-MCNC: 0.9 MG/DL (ref 0–1.2)
BUN BLDV-MCNC: 21 MG/DL (ref 6–23)
CALCIUM SERPL-MCNC: 10.2 MG/DL (ref 8.6–10.2)
CHLORIDE BLD-SCNC: 103 MMOL/L (ref 98–107)
CHOLESTEROL, TOTAL: 126 MG/DL (ref 0–199)
CO2: 23 MMOL/L (ref 22–29)
CREAT SERPL-MCNC: 1.3 MG/DL (ref 0.5–1)
EOSINOPHILS ABSOLUTE: 0 E9/L (ref 0.05–0.5)
EOSINOPHILS RELATIVE PERCENT: 0.6 % (ref 0–6)
GFR SERPL CREATININE-BSD FRML MDRD: 40 ML/MIN/1.73
GLUCOSE BLD-MCNC: 160 MG/DL (ref 74–99)
HCT VFR BLD CALC: 46.6 % (ref 34–48)
HDLC SERPL-MCNC: 51 MG/DL
HEMOGLOBIN: 15.9 G/DL (ref 11.5–15.5)
LDL CHOLESTEROL CALCULATED: 45 MG/DL (ref 0–99)
LYMPHOCYTES ABSOLUTE: 0.57 E9/L (ref 1.5–4)
LYMPHOCYTES RELATIVE PERCENT: 7.8 % (ref 20–42)
MACRO-OVALOCYTES: ABNORMAL
MCH RBC QN AUTO: 39.9 PG (ref 26–35)
MCHC RBC AUTO-ENTMCNC: 34.1 % (ref 32–34.5)
MCV RBC AUTO: 117.1 FL (ref 80–99.9)
MONOCYTES ABSOLUTE: 0.21 E9/L (ref 0.1–0.95)
MONOCYTES RELATIVE PERCENT: 2.6 % (ref 2–12)
NEUTROPHILS ABSOLUTE: 6.32 E9/L (ref 1.8–7.3)
NEUTROPHILS RELATIVE PERCENT: 88.7 % (ref 43–80)
OVALOCYTES: ABNORMAL
PDW BLD-RTO: 21.9 FL (ref 11.5–15)
PLATELET # BLD: 169 E9/L (ref 130–450)
PMV BLD AUTO: 10.7 FL (ref 7–12)
POIKILOCYTES: ABNORMAL
POTASSIUM SERPL-SCNC: 4.7 MMOL/L (ref 3.5–5)
RBC # BLD: 3.98 E12/L (ref 3.5–5.5)
SODIUM BLD-SCNC: 138 MMOL/L (ref 132–146)
TOTAL PROTEIN: 6.9 G/DL (ref 6.4–8.3)
TRIGL SERPL-MCNC: 149 MG/DL (ref 0–149)
TSH SERPL DL<=0.05 MIU/L-ACNC: 2.85 UIU/ML (ref 0.27–4.2)
VITAMIN D 25-HYDROXY: 42 NG/ML (ref 30–100)
VLDLC SERPL CALC-MCNC: 30 MG/DL
WBC # BLD: 7.1 E9/L (ref 4.5–11.5)

## 2023-01-11 ENCOUNTER — OFFICE VISIT (OUTPATIENT)
Dept: ONCOLOGY | Age: 88
End: 2023-01-11

## 2023-01-11 ENCOUNTER — HOSPITAL ENCOUNTER (OUTPATIENT)
Dept: INFUSION THERAPY | Age: 88
Discharge: HOME | End: 2023-01-11
Payer: MEDICARE

## 2023-01-11 VITALS
BODY MASS INDEX: 26.32 KG/M2 | HEIGHT: 64 IN | SYSTOLIC BLOOD PRESSURE: 121 MMHG | WEIGHT: 154.2 LBS | HEART RATE: 81 BPM | DIASTOLIC BLOOD PRESSURE: 63 MMHG | OXYGEN SATURATION: 96 % | TEMPERATURE: 97 F

## 2023-01-11 VITALS — DIASTOLIC BLOOD PRESSURE: 51 MMHG | SYSTOLIC BLOOD PRESSURE: 87 MMHG | HEART RATE: 81 BPM

## 2023-01-11 DIAGNOSIS — D45 POLYCYTHEMIA VERA (HCC): Primary | ICD-10-CM

## 2023-01-11 DIAGNOSIS — N18.30 STAGE 3 CHRONIC KIDNEY DISEASE, UNSPECIFIED WHETHER STAGE 3A OR 3B CKD (HCC): ICD-10-CM

## 2023-01-11 PROCEDURE — 99195 PHLEBOTOMY: CPT

## 2023-01-11 RX ORDER — 0.9 % SODIUM CHLORIDE 0.9 %
500 INTRAVENOUS SOLUTION INTRAVENOUS ONCE
OUTPATIENT
Start: 2023-01-11 | End: 2023-01-11

## 2023-01-11 RX ORDER — 0.9 % SODIUM CHLORIDE 0.9 %
250 INTRAVENOUS SOLUTION INTRAVENOUS ONCE
OUTPATIENT
Start: 2023-01-11 | End: 2023-01-11

## 2023-01-11 RX ORDER — HYDROXYUREA 500 MG/1
500 CAPSULE ORAL 2 TIMES DAILY
Qty: 60 CAPSULE | Refills: 1 | Status: SHIPPED | OUTPATIENT
Start: 2023-01-11

## 2023-01-11 NOTE — PROGRESS NOTES
IV site initiated at [facility] with 20# intima. Therapeutic phlebotomy performed for 250 cc blood. Tolerated procedure and 15 minute observation without adverse reaction. Took fluids freely throughout observation. Vitals: BP (!) 87/51   Pulse 81        Verbal information provided to patient / family on procedure and post procedure care. Encouraged to call clinic during clinic hours and physician after clinic hours if questions or concerns arise.

## 2023-01-11 NOTE — PROGRESS NOTES
[address] [facility] [address] [facility] [place]        Pt Name: Eliazar York  YOB: 1935  Date of evaluation: 1/11/2023  Primary Care Physician: Arron Brown DO  Reason for evaluation:   Chief Complaint   Patient presents with    Other     Polycythemia Vera        Subjective: Here for  follow up. Tolerating Hydrea well. OBJECTIVE:  VITALS:  height is 5' 4\" (1.626 m) and weight is 154 lb 3.2 oz (69.9 kg). Her temperature is 97 °F (36.1 °C). Her blood pressure is 121/63 and her pulse is 81. Her oxygen saturation is 96%. Physical Exam:  Performance Status: 0  Well developed, well nourished female  General: AAO to person, place, time, in no acute distress. Head and neck: PERRLA, EOMI . Sclera non icteric. Oropharynx: Clear. Neck: no JVD,  no adenopathy. Heart: Regular rate and regular rhythm. No murmur. Lungs: Clear to auscultation. Abdomen: Soft, non-tender;no masses, no organomegaly. Extremities: No edema,no cyanosis, no clubbing. Neurologic:Cranial nerves grossly intact. No focal motor or sensory deficits. Skin:  No rash. Medications  Prior to Admission medications    Medication Sig Start Date End Date Taking?  Authorizing Provider   alendronate (FOSAMAX) 70 MG tablet TAKE 1 TABLET BY MOUTH 1 TIME A WEEK 12/20/22   Arron Brown DO   hydroxyurea (HYDREA) 500 MG chemo capsule Take 1 capsule by mouth 2 times daily 11/30/22   Mesfin Black MD   hydroxyurea (HYDREA) 500 MG chemo capsule Take 1 capsule by mouth in the morning. 7/25/22   Mesfin Black MD   colestipol (COLESTID) 1 g tablet TAKE 2 TABLETS TWICE DAILY 5/23/22   Arron Brown DO   lisinopril (PRINIVIL;ZESTRIL) 10 MG tablet TAKE 1 TABLET EVERY DAY 5/16/22   Arron Brown DO   pantoprazole (PROTONIX) 40 MG tablet TAKE 1 TABLET EVERY DAY 5/16/22   Arron Brown DO   escitalopram (LEXAPRO) 20 MG tablet TAKE 1 TABLET EVERY DAY 5/16/22   Lavetta Stephanie, DO   atorvastatin (LIPITOR) 40 MG tablet TAKE 1 TABLET EVERY DAY 5/9/22   Kary Wallace, DO   ondansetron [facility]) 4 MG tablet Take 1 tablet by mouth 3 times daily as needed for Nausea or Vomiting 4/18/22   Chico Pruitt, DO   oxybutynin (DITROPAN-XL) 10 MG extended release tablet  4/8/22   Historical Provider, MD   vitamin B-12 (CYANOCOBALAMIN) 1000 MCG tablet Take 1,000 mcg by mouth daily    Historical Provider, MD   Melatonin 5 MG CAPS Take 5 mg by mouth daily    Historical Provider, MD   docusate sodium (COLACE) 100 MG capsule Take 100 mg by mouth 2 times daily    Historical Provider, MD   nitrofurantoin, macrocrystal-monohydrate, (MACROBID) 100 MG capsule TAKE 1 CAPSULE EVERY DAY FOR 10 DAYS.  RESUME AFTER COMPLETING COURSE OF LEVAQUIN. 2/8/22   Kary Wallace, DO   Multiple Vitamins-Minerals (THERAPEUTIC MULTIVITAMIN-MINERALS) tablet Take 1 tablet by mouth daily    Historical Provider, MD   aspirin 81 MG EC tablet Take 81 mg by mouth daily    Historical Provider, MD   calcium carbonate (OSCAL) 500 MG TABS tablet Take 500 mg by mouth daily Indications: plus 10 mcg D3 Patient is taking 3 tablets daily    Historical Provider, MD    Scheduled Meds:  Continuous Infusions:  PRN Meds:.        Recent Laboratory Data-     Lab Results   Component Value Date    WBC 7.1 01/06/2023    HGB 15.9 (H) 01/06/2023    HCT 46.6 01/06/2023    .1 (H) 01/06/2023     01/06/2023    LYMPHOPCT 7.8 (L) 01/06/2023    RBC 3.98 01/06/2023    MCH 39.9 (H) 01/06/2023    MCHC 34.1 01/06/2023    RDW 21.9 (H) 01/06/2023    NEUTOPHILPCT 88.7 (H) 01/06/2023    MONOPCT 2.6 01/06/2023    BASOPCT 0.9 01/06/2023    NEUTROABS 6.32 01/06/2023    LYMPHSABS 0.57 (L) 01/06/2023    MONOSABS 0.21 01/06/2023    EOSABS 0.00 (L) 01/06/2023    BASOSABS 0.06 01/06/2023       Lab Results   Component Value Date     01/06/2023    K 4.7 01/06/2023     01/06/2023    CO2 23 01/06/2023    BUN 21 01/06/2023    CREATININE 1.3 (H) 01/06/2023    GLUCOSE 160 (H) 01/06/2023 CALCIUM 10.2 01/06/2023    PROT 6.9 01/06/2023    LABALBU 4.1 01/06/2023    BILITOT 0.9 01/06/2023    ALKPHOS 62 01/06/2023    AST 20 01/06/2023    ALT 13 01/06/2023    LABGLOM 40 01/06/2023    GFRAA 57 09/02/2022         QUALITATIVE JAK2 MUTATION:  POSITIVE.      BCR/ABL1 Interphase FISH:  NEGATIVE          Radiology-  US RIGHT BREAST:  8/24/2020  1. 2 tiny cystic lesions including simple cyst at the 3 o'clock position and    mildly complex cyst at the 6 o'clock patient vision. Both of these measure 4    mm. 2. Otherwise, unremarkable right breast ultrasound and diagnostic mammogram.    Please note that a negative report should not delay biopsy if there is a    clinically suspicious palpable abnormality. Unless otherwise clinically    indicated, follow-up mammography and ultrasound of the right breast in 6    months is suggested. BIRADS:    BIRADS - CATEGORY 3         Findings are probably benign. A short interval follow-up is recommended in 6    months. OVERALL ASSESSMENT - PROBABLY BENIGN. DIGITAL AILIN RIGHT MAMMOGRAM: 8/24/2020  No radiographic evidence of malignancy. The asymmetric density seen on the    recent screening mammogram is not confirmed on the additional views. Subsequent ultrasound study only showed a couple of tiny cystic lesions with    no suspicious mass seen. Unless otherwise clinically indicated, follow-up    mammography and ultrasound of the right breast in 6 months is suggested. BIRADS:    BIRADS - CATEGORY 3         Findings are probably benign. A short interval follow-up is recommended in 6    months. OVERALL ASSESSMENT - PROBABLY BENIGN. BILATERAL SCREENING MAMMOGRAM:  8/04/2020.  1. Approximately 1 cm asymmetry seen within the medial aspect of the right    breast 4.7 cm from the nipple. The asymmetry is only seen on the CC view. Dedicated diagnostic mammogram of the right breast is recommended.     2. Stable mammogram of the left breast.  Annual screening mammography left    breast is recommended. BIRADS:    BIRADS - CATEGORY 0         Additional imaging is recommended at this time. OVERALL ASSESSMENT - INCOMPLETE:NEED ADDITIONAL IMAGING EVALUATION. ASSESSMENT/PLAN :  Polycythemia with borderline neutrophilic leukocytosis. She has no clinical suggestion of reactive compensatory secondary polycythemia with absence of prior history of smoking COPD or sleep apnea     Rule out primary myeloproliferative disorder such as P vera. Rule out CML doubt     To check CBC, blood smear review, baseline EPO level, qualitative JAK2 mutation as well as BCR ABL by FISH. We will follow    Her EPO level was low at 1. Qualitative Jose Guadalupe 2 mutation was positive  BCR-ABL by FISH was negative    She has polycythemia vera  Hemoglobin was elevated at 17.9 on 3/9/2020    . Hemoglobin was elevated at 17.7 on 3/30/2020 and she underwent a  therapeutic phlebotomy and 250 cc of blood was removed under medical supervision. She was initiated on hydroxyurea 500 mg daily. Hemoglobin was elevated at 18.0  on 4/24/2020 and she underwent a  therapeutic phlebotomy and 250 cc of blood was  removed under medical supervision. Her Hydrea was increased to 500 mg twice daily. She was given PRN Zofran for nausea      Hgb was 16 on 5/27/2020 and she underwent phlebotomy with a target hemoglobin of 15  She will be maintained on Hydrea 500 mg twice daily. Hgb was 11.7  on  7/28/2020  Hydrea dose was decreased to 500 mg daily    Hgb was 12.7  on  8/25/2020  Hgb was 14.3  on  9/22/2020. No phlebotomy warranted to continue Hydrea 500 mg daily      11/09/2020  Hgb was 15.9; Hct was 45.5  on 10/27/2020  She is to undergo a  therapeutic phlebotomy and 250 cc of blood will be  removed under medical supervision with a target hemoglobin of 15.   Her Hydrea dose will be increased back to 500 mg twice daily      4/14/2021  CBC today reviewed and is in the normal range and no phlebotomy indicated. She is to continue on Hydrea 500 mg twice daily      9/15/2021  Tolerating Hydrea well. CBC shows leukopenia with a hemoglobin of 11.8 elevated MCV and a platelet count of 946    To decrease Hydrea dose to 500 mg daily  To have follow-up CBC in 6 weeks      10/27/21  Tolerating Hydrea well. Her dose had been decreased to 500 mg daily. Her CBC today shows a WBC count of 4.9 with a hemoglobin of 13.8  and normal platelet count of 993. CBC and CMP are unremarkable  She has CKD stable stage III. To continue present dose of Hydrea      1/31/2022  Continues on Hydrea 500 mg daily and tolerating it well  Her CBC today shows a hemoglobin of 14.8 with an MCV of 113 with normal WBC count of 6.5 and normal platelet count of 417  Renal function slightly improved. Patient with polycythemia vera. No phlebotomy warranted today. To continue Hydrea 500 mg p.o. daily    5/2/22  Feels fairly well and continues on Hydrea 500 mg daily without any side effects. Most recent CBC shows a hemoglobin of 15.7. She will be recommended therapeutic phlebotomy today and to 250 cc of blood will be withdrawn under medical supervision. To continue present dose of Hydrea. 6/6/2022  On Hydrea 500 mg daily. Hgb was 15.2 on 6/3/2022  She will be recommended therapeutic phlebotomy today and to 250 cc of blood will be withdrawn under medical supervision. To continue present dose of Hydrea. 7/25/2022  No change in her other meds. Her exam is unremarkable. No splenomegaly. She has been on Hydrea 500 mg daily. Most recent CBC shows a hemoglobin of 15.9 and elevated MCV in relation to Hydrea. She will undergo therapeutic phlebotomy today and 250 cc of blood will be withdrawn under medical supervision. To continue present dose of Hydrea. 9/7/2022  Hgb was 16.2 on 9/2/2022.   She will undergo therapeutic phlebotomy today and 250 cc of blood will be withdrawn under medical supervision. To continue present dose of Hydrea. To return in 6 weeks for OV with Dr. Catalino Corral and possible Phlebotomy      10/19/2022  Hgb is 16.1    She will undergo therapeutic phlebotomy today and 250 cc of blood will be withdrawn under medical supervision. Exam is negative. No splenomegaly. Her hydroxyurea dose to be increased to 500 mg twice daily. 11/30/2022  Doing well. No change in medications. Her hydroxyurea dose was increased to 500 mg twice daily. She required phlebotomy back in October. Her hemoglobin is improved and down to 15.3. She is to undergo today and additional therapeutic phlebotomy and 250 cc of blood will be drawn under medical supervision. She will be maintained on Hydrea 500 mg twice daily. On Hydrea 500 mg BID      1/11/2023  Patient has been compliant with her Hydrea and is taking 500 mg twice daily. She is tolerating well. On exam no evidence of splenomegaly. Hgb is 15.9; Hct is 46.6 on 1/06/2023. She will undergo therapeutic phlebotomy and to 50 cc of blood will be drawn under medical supervision. To continue Hydrea 500 mg twice daily      Kim Schulz M.D., F.A.C.P.   Electronically signed 1/11/2023 at 9:19 AM

## 2023-02-06 RX ORDER — LISINOPRIL 10 MG/1
TABLET ORAL
Qty: 90 TABLET | Refills: 0 | Status: SHIPPED | OUTPATIENT
Start: 2023-02-06

## 2023-02-20 DIAGNOSIS — D45 POLYCYTHEMIA VERA (HCC): ICD-10-CM

## 2023-02-20 DIAGNOSIS — N18.30 STAGE 3 CHRONIC KIDNEY DISEASE, UNSPECIFIED WHETHER STAGE 3A OR 3B CKD (HCC): ICD-10-CM

## 2023-02-20 LAB
ALBUMIN SERPL-MCNC: 4.1 G/DL (ref 3.5–5.2)
ALP BLD-CCNC: 61 U/L (ref 35–104)
ALT SERPL-CCNC: 13 U/L (ref 0–32)
ANION GAP SERPL CALCULATED.3IONS-SCNC: 17 MMOL/L (ref 7–16)
ANISOCYTOSIS: ABNORMAL
AST SERPL-CCNC: 22 U/L (ref 0–31)
BASOPHILS ABSOLUTE: 0.21 E9/L (ref 0–0.2)
BASOPHILS RELATIVE PERCENT: 3.5 % (ref 0–2)
BILIRUB SERPL-MCNC: 0.7 MG/DL (ref 0–1.2)
BUN BLDV-MCNC: 18 MG/DL (ref 6–23)
CALCIUM SERPL-MCNC: 9.4 MG/DL (ref 8.6–10.2)
CHLORIDE BLD-SCNC: 105 MMOL/L (ref 98–107)
CO2: 17 MMOL/L (ref 22–29)
CREAT SERPL-MCNC: 1.1 MG/DL (ref 0.5–1)
EOSINOPHILS ABSOLUTE: 0.1 E9/L (ref 0.05–0.5)
EOSINOPHILS RELATIVE PERCENT: 1.7 % (ref 0–6)
GFR SERPL CREATININE-BSD FRML MDRD: 48 ML/MIN/1.73
GLUCOSE BLD-MCNC: 165 MG/DL (ref 74–99)
HCT VFR BLD CALC: 38 % (ref 34–48)
HEMOGLOBIN: 13.5 G/DL (ref 11.5–15.5)
LYMPHOCYTES ABSOLUTE: 0.53 E9/L (ref 1.5–4)
LYMPHOCYTES RELATIVE PERCENT: 8.7 % (ref 20–42)
MCH RBC QN AUTO: 44.7 PG (ref 26–35)
MCHC RBC AUTO-ENTMCNC: 35.5 % (ref 32–34.5)
MCV RBC AUTO: 125.8 FL (ref 80–99.9)
MONOCYTES ABSOLUTE: 0.24 E9/L (ref 0.1–0.95)
MONOCYTES RELATIVE PERCENT: 3.5 % (ref 2–12)
NEUTROPHILS ABSOLUTE: 4.9 E9/L (ref 1.8–7.3)
NEUTROPHILS RELATIVE PERCENT: 82.6 % (ref 43–80)
OVALOCYTES: ABNORMAL
PDW BLD-RTO: 17.8 FL (ref 11.5–15)
PLATELET # BLD: 177 E9/L (ref 130–450)
PMV BLD AUTO: 10.6 FL (ref 7–12)
POIKILOCYTES: ABNORMAL
POLYCHROMASIA: ABNORMAL
POTASSIUM SERPL-SCNC: 4.4 MMOL/L (ref 3.5–5)
RBC # BLD: 3.02 E12/L (ref 3.5–5.5)
SODIUM BLD-SCNC: 139 MMOL/L (ref 132–146)
TEAR DROP CELLS: ABNORMAL
TOTAL PROTEIN: 6.8 G/DL (ref 6.4–8.3)
WBC # BLD: 5.9 E9/L (ref 4.5–11.5)

## 2023-03-06 RX ORDER — ESCITALOPRAM OXALATE 20 MG/1
TABLET ORAL
Qty: 90 TABLET | Refills: 0 | Status: SHIPPED | OUTPATIENT
Start: 2023-03-06

## 2023-03-06 RX ORDER — ATORVASTATIN CALCIUM 40 MG/1
TABLET, FILM COATED ORAL
Qty: 90 TABLET | Refills: 0 | Status: SHIPPED | OUTPATIENT
Start: 2023-03-06

## 2023-03-06 RX ORDER — PANTOPRAZOLE SODIUM 40 MG/1
TABLET, DELAYED RELEASE ORAL
Qty: 90 TABLET | Refills: 0 | Status: SHIPPED | OUTPATIENT
Start: 2023-03-06

## 2023-03-06 RX ORDER — MONTELUKAST SODIUM 4 MG/1
TABLET, CHEWABLE ORAL
Qty: 360 TABLET | Refills: 0 | Status: SHIPPED | OUTPATIENT
Start: 2023-03-06

## 2023-03-23 RX ORDER — ALENDRONATE SODIUM 70 MG/1
TABLET ORAL
Qty: 12 TABLET | Refills: 0 | Status: SHIPPED | OUTPATIENT
Start: 2023-03-23

## 2023-04-03 ENCOUNTER — OFFICE VISIT (OUTPATIENT)
Dept: PRIMARY CARE CLINIC | Age: 88
End: 2023-04-03
Payer: MEDICARE

## 2023-04-03 VITALS
HEART RATE: 86 BPM | WEIGHT: 151 LBS | HEIGHT: 64 IN | SYSTOLIC BLOOD PRESSURE: 120 MMHG | BODY MASS INDEX: 25.78 KG/M2 | TEMPERATURE: 98.4 F | DIASTOLIC BLOOD PRESSURE: 80 MMHG | OXYGEN SATURATION: 98 %

## 2023-04-03 DIAGNOSIS — E53.8 VITAMIN B12 DEFICIENCY: ICD-10-CM

## 2023-04-03 DIAGNOSIS — E07.9 THYROID DISEASE: ICD-10-CM

## 2023-04-03 DIAGNOSIS — R73.9 HYPERGLYCEMIA: ICD-10-CM

## 2023-04-03 DIAGNOSIS — N18.30 STAGE 3 CHRONIC KIDNEY DISEASE, UNSPECIFIED WHETHER STAGE 3A OR 3B CKD (HCC): ICD-10-CM

## 2023-04-03 DIAGNOSIS — I10 PRIMARY HYPERTENSION: ICD-10-CM

## 2023-04-03 DIAGNOSIS — R19.7 POSTCHOLECYSTECTOMY DIARRHEA: ICD-10-CM

## 2023-04-03 DIAGNOSIS — I10 PRIMARY HYPERTENSION: Primary | ICD-10-CM

## 2023-04-03 DIAGNOSIS — E03.8 SUBCLINICAL HYPOTHYROIDISM: ICD-10-CM

## 2023-04-03 DIAGNOSIS — E78.5 HYPERLIPIDEMIA, UNSPECIFIED HYPERLIPIDEMIA TYPE: ICD-10-CM

## 2023-04-03 DIAGNOSIS — K91.89 POSTCHOLECYSTECTOMY DIARRHEA: ICD-10-CM

## 2023-04-03 PROBLEM — I36.1 NONRHEUMATIC TRICUSPID VALVE REGURGITATION: Status: ACTIVE | Noted: 2023-04-03

## 2023-04-03 PROBLEM — M85.80 OSTEOPENIA: Status: ACTIVE | Noted: 2023-04-03

## 2023-04-03 LAB
ALBUMIN SERPL-MCNC: 3.8 G/DL (ref 3.5–5.2)
ALP SERPL-CCNC: 57 U/L (ref 35–104)
ALT SERPL-CCNC: 16 U/L (ref 0–32)
ANION GAP SERPL CALCULATED.3IONS-SCNC: 12 MMOL/L (ref 7–16)
AST SERPL-CCNC: 30 U/L (ref 0–31)
BILIRUB SERPL-MCNC: 0.8 MG/DL (ref 0–1.2)
BUN SERPL-MCNC: 20 MG/DL (ref 6–23)
CALCIUM SERPL-MCNC: 9.5 MG/DL (ref 8.6–10.2)
CHLORIDE SERPL-SCNC: 107 MMOL/L (ref 98–107)
CHOLESTEROL, TOTAL: 115 MG/DL (ref 0–199)
CO2 SERPL-SCNC: 20 MMOL/L (ref 22–29)
CREAT SERPL-MCNC: 1 MG/DL (ref 0.5–1)
ERYTHROCYTE [DISTWIDTH] IN BLOOD BY AUTOMATED COUNT: 16 FL (ref 11.5–15)
GLUCOSE FASTING: 140 MG/DL (ref 74–99)
HCT VFR BLD AUTO: 39.9 % (ref 34–48)
HDLC SERPL-MCNC: 55 MG/DL
HGB BLD-MCNC: 13.5 G/DL (ref 11.5–15.5)
LDLC SERPL CALC-MCNC: 38 MG/DL (ref 0–99)
MCH RBC QN AUTO: 45.2 PG (ref 26–35)
MCHC RBC AUTO-ENTMCNC: 33.8 % (ref 32–34.5)
MCV RBC AUTO: 133.4 FL (ref 80–99.9)
PLATELET # BLD AUTO: 162 E9/L (ref 130–450)
PMV BLD AUTO: 10.9 FL (ref 7–12)
POTASSIUM SERPL-SCNC: 4.6 MMOL/L (ref 3.5–5)
PROT SERPL-MCNC: 6.4 G/DL (ref 6.4–8.3)
RBC # BLD AUTO: 2.99 E12/L (ref 3.5–5.5)
SODIUM SERPL-SCNC: 139 MMOL/L (ref 132–146)
TRIGL SERPL-MCNC: 108 MG/DL (ref 0–149)
TSH SERPL-MCNC: 3.41 UIU/ML (ref 0.27–4.2)
VLDLC SERPL CALC-MCNC: 22 MG/DL
WBC # BLD: 5.7 E9/L (ref 4.5–11.5)

## 2023-04-03 PROCEDURE — 1036F TOBACCO NON-USER: CPT | Performed by: FAMILY MEDICINE

## 2023-04-03 PROCEDURE — 1123F ACP DISCUSS/DSCN MKR DOCD: CPT | Performed by: FAMILY MEDICINE

## 2023-04-03 PROCEDURE — 1090F PRES/ABSN URINE INCON ASSESS: CPT | Performed by: FAMILY MEDICINE

## 2023-04-03 PROCEDURE — 99214 OFFICE O/P EST MOD 30 MIN: CPT | Performed by: FAMILY MEDICINE

## 2023-04-03 PROCEDURE — 90677 PCV20 VACCINE IM: CPT | Performed by: FAMILY MEDICINE

## 2023-04-03 PROCEDURE — G8417 CALC BMI ABV UP PARAM F/U: HCPCS | Performed by: FAMILY MEDICINE

## 2023-04-03 PROCEDURE — G0009 ADMIN PNEUMOCOCCAL VACCINE: HCPCS | Performed by: FAMILY MEDICINE

## 2023-04-03 PROCEDURE — G8427 DOCREV CUR MEDS BY ELIG CLIN: HCPCS | Performed by: FAMILY MEDICINE

## 2023-04-03 SDOH — ECONOMIC STABILITY: FOOD INSECURITY: WITHIN THE PAST 12 MONTHS, YOU WORRIED THAT YOUR FOOD WOULD RUN OUT BEFORE YOU GOT MONEY TO BUY MORE.: NEVER TRUE

## 2023-04-03 SDOH — ECONOMIC STABILITY: HOUSING INSECURITY
IN THE LAST 12 MONTHS, WAS THERE A TIME WHEN YOU DID NOT HAVE A STEADY PLACE TO SLEEP OR SLEPT IN A SHELTER (INCLUDING NOW)?: NO

## 2023-04-03 SDOH — ECONOMIC STABILITY: INCOME INSECURITY: HOW HARD IS IT FOR YOU TO PAY FOR THE VERY BASICS LIKE FOOD, HOUSING, MEDICAL CARE, AND HEATING?: NOT HARD AT ALL

## 2023-04-03 SDOH — ECONOMIC STABILITY: FOOD INSECURITY: WITHIN THE PAST 12 MONTHS, THE FOOD YOU BOUGHT JUST DIDN'T LAST AND YOU DIDN'T HAVE MONEY TO GET MORE.: NEVER TRUE

## 2023-04-03 ASSESSMENT — PATIENT HEALTH QUESTIONNAIRE - PHQ9
1. LITTLE INTEREST OR PLEASURE IN DOING THINGS: 0
SUM OF ALL RESPONSES TO PHQ QUESTIONS 1-9: 0
SUM OF ALL RESPONSES TO PHQ9 QUESTIONS 1 & 2: 0
SUM OF ALL RESPONSES TO PHQ QUESTIONS 1-9: 0
SUM OF ALL RESPONSES TO PHQ QUESTIONS 1-9: 0
2. FEELING DOWN, DEPRESSED OR HOPELESS: 0
SUM OF ALL RESPONSES TO PHQ QUESTIONS 1-9: 0

## 2023-04-03 NOTE — PROGRESS NOTES
swelling, tenderness or deformity. Cervical back: Neck supple. Right lower leg: No edema. Left lower leg: No edema. Lymphadenopathy:      Cervical: No cervical adenopathy. Skin:     General: Skin is warm and dry. Neurological:      General: No focal deficit present. Mental Status: She is alert and oriented to person, place, and time. Cranial Nerves: No cranial nerve deficit. Psychiatric:         Mood and Affect: Mood normal.         Behavior: Behavior normal.         Thought Content:  Thought content normal.         Judgment: Judgment normal.          CBC  WBC   Date Value Ref Range Status   04/03/2023 5.7 4.5 - 11.5 E9/L Final     RBC   Date Value Ref Range Status   04/03/2023 2.99 (L) 3.50 - 5.50 E12/L Final     Hemoglobin   Date Value Ref Range Status   04/03/2023 13.5 11.5 - 15.5 g/dL Final     Hematocrit   Date Value Ref Range Status   04/03/2023 39.9 34.0 - 48.0 % Final     MCV   Date Value Ref Range Status   04/03/2023 133.4 (H) 80.0 - 99.9 fL Final     MCH   Date Value Ref Range Status   04/03/2023 45.2 (H) 26.0 - 35.0 pg Final     MCHC   Date Value Ref Range Status   04/03/2023 33.8 32.0 - 34.5 % Final     RDW   Date Value Ref Range Status   04/03/2023 16.0 (H) 11.5 - 15.0 fL Final     Platelets   Date Value Ref Range Status   04/03/2023 162 130 - 450 E9/L Final     MPV   Date Value Ref Range Status   04/03/2023 10.9 7.0 - 12.0 fL Final     Neutrophils %   Date Value Ref Range Status   02/20/2023 82.6 (H) 43.0 - 80.0 % Final     Immature Granulocytes #   Date Value Ref Range Status   11/29/2022 0.04 E9/L Final     Immature Granulocytes %   Date Value Ref Range Status   11/29/2022 0.6 0.0 - 5.0 % Final     Lymphocytes %   Date Value Ref Range Status   02/20/2023 8.7 (L) 20.0 - 42.0 % Final     Monocytes %   Date Value Ref Range Status   02/20/2023 3.5 2.0 - 12.0 % Final     Eosinophils %   Date Value Ref Range Status   02/20/2023 1.7 0.0 - 6.0 % Final     Basophils %   Date

## 2023-04-04 LAB
HBA1C MFR BLD: 4.8 % (ref 4–5.6)
VIT B12 SERPL-MCNC: 1124 PG/ML (ref 211–946)

## 2023-04-04 ASSESSMENT — ENCOUNTER SYMPTOMS
RHINORRHEA: 0
ABDOMINAL PAIN: 0
NAUSEA: 0
WHEEZING: 0
EYE PAIN: 0
COUGH: 0
EYE DISCHARGE: 0
DIARRHEA: 0
BLOOD IN STOOL: 0
SINUS PRESSURE: 0
PHOTOPHOBIA: 0
EYE ITCHING: 0
SORE THROAT: 0
ABDOMINAL DISTENTION: 0
FACIAL SWELLING: 0
COLOR CHANGE: 0
CONSTIPATION: 0
VOMITING: 0
SHORTNESS OF BREATH: 0

## 2023-04-05 DIAGNOSIS — D45 POLYCYTHEMIA VERA (HCC): Primary | ICD-10-CM

## 2023-04-05 DIAGNOSIS — N18.30 STAGE 3 CHRONIC KIDNEY DISEASE, UNSPECIFIED WHETHER STAGE 3A OR 3B CKD (HCC): ICD-10-CM

## 2023-04-24 DIAGNOSIS — D45 POLYCYTHEMIA VERA (HCC): ICD-10-CM

## 2023-04-25 RX ORDER — HYDROXYUREA 500 MG/1
CAPSULE ORAL
Qty: 60 CAPSULE | Refills: 1 | OUTPATIENT
Start: 2023-04-25

## 2023-04-26 DIAGNOSIS — D45 POLYCYTHEMIA VERA (HCC): ICD-10-CM

## 2023-04-26 RX ORDER — HYDROXYUREA 500 MG/1
500 CAPSULE ORAL 2 TIMES DAILY
Qty: 60 CAPSULE | Refills: 1 | Status: SHIPPED | OUTPATIENT
Start: 2023-04-26 | End: 2023-05-03 | Stop reason: SDUPTHER

## 2023-05-01 DIAGNOSIS — N18.30 STAGE 3 CHRONIC KIDNEY DISEASE, UNSPECIFIED WHETHER STAGE 3A OR 3B CKD (HCC): ICD-10-CM

## 2023-05-01 DIAGNOSIS — D45 POLYCYTHEMIA VERA (HCC): ICD-10-CM

## 2023-05-01 LAB
ALBUMIN SERPL-MCNC: 4.1 G/DL (ref 3.5–5.2)
ALP SERPL-CCNC: 53 U/L (ref 35–104)
ALT SERPL-CCNC: 14 U/L (ref 0–32)
ANION GAP SERPL CALCULATED.3IONS-SCNC: 12 MMOL/L (ref 7–16)
AST SERPL-CCNC: 20 U/L (ref 0–31)
BILIRUB SERPL-MCNC: 0.8 MG/DL (ref 0–1.2)
BUN SERPL-MCNC: 22 MG/DL (ref 6–23)
CALCIUM SERPL-MCNC: 9.7 MG/DL (ref 8.6–10.2)
CHLORIDE SERPL-SCNC: 105 MMOL/L (ref 98–107)
CO2 SERPL-SCNC: 22 MMOL/L (ref 22–29)
CREAT SERPL-MCNC: 1.1 MG/DL (ref 0.5–1)
GLUCOSE SERPL-MCNC: 131 MG/DL (ref 74–99)
POTASSIUM SERPL-SCNC: 4.5 MMOL/L (ref 3.5–5)
PROT SERPL-MCNC: 6.6 G/DL (ref 6.4–8.3)
SODIUM SERPL-SCNC: 139 MMOL/L (ref 132–146)

## 2023-05-01 NOTE — PROGRESS NOTES
[address] [facility]. [facility] [place]        Pt Name: Eda Avila  YOB: 1935  Date of evaluation: 5/3/2023  Primary Care Physician: Emily Bowie DO  Reason for evaluation:   Chief Complaint   Patient presents with    Other     Polycythemia vera    Chronic Kidney Disease    Follow-up          Subjective: Here for  follow up. On Hydrea BID. Tolerating Hydrea well. OBJECTIVE:  VITALS:  height is 5' 4\" (1.626 m) and weight is 151 lb 1.6 oz (68.5 kg). Her temperature is 98.7 °F (37.1 °C). Her blood pressure is 146/79 (abnormal) and her pulse is 80. Her oxygen saturation is 96%. Physical Exam:  Performance Status: 0  Well developed, well nourished female  General: AAO to person, place, time, in no acute distress. Head and neck: PERRLA, EOMI . Sclera non icteric. Oropharynx: Clear. Neck: no JVD,  no adenopathy. Heart: Regular rate and regular rhythm. No murmur. Lungs: Clear to auscultation. Abdomen: Soft, non-tender;no masses, no organomegaly. Extremities: No edema,no cyanosis, no clubbing. Neurologic:Cranial nerves grossly intact. No focal motor or sensory deficits. Skin:  No rash. Medications  Prior to Admission medications    Medication Sig Start Date End Date Taking?  Authorizing Provider   hydroxyurea (HYDREA) 500 MG chemo capsule Take 1 capsule by mouth 2 times daily 4/26/23   AMADA Aiken - CNP   alendronate (FOSAMAX) 70 MG tablet TAKE 1 TABLET BY MOUTH 1 TIME A WEEK 3/23/23   Emily Bowie DO   atorvastatin (LIPITOR) 40 MG tablet TAKE 1 TABLET EVERY DAY 3/6/23   Lali Pruitt DO   escitalopram (LEXAPRO) 20 MG tablet TAKE 1 TABLET EVERY DAY 3/6/23   Bhavesh Pruitt DO   colestipol (COLESTID) 1 g tablet TAKE 2 TABLETS TWICE DAILY 3/6/23   Lali Pruitt DO   pantoprazole (PROTONIX) 40 MG tablet TAKE 1 TABLET EVERY DAY 3/6/23   Bhavesh Pruitt DO   lisinopril (PRINIVIL;ZESTRIL) 10 MG tablet TAKE 1 TABLET

## 2023-05-02 LAB
ANISOCYTOSIS: ABNORMAL
BASOPHILS # BLD: 0.12 E9/L (ref 0–0.2)
BASOPHILS NFR BLD: 1.7 % (ref 0–2)
EOSINOPHIL # BLD: 0.06 E9/L (ref 0.05–0.5)
EOSINOPHIL NFR BLD: 0.9 % (ref 0–6)
ERYTHROCYTE [DISTWIDTH] IN BLOOD BY AUTOMATED COUNT: 15.3 FL (ref 11.5–15)
HCT VFR BLD AUTO: 37.9 % (ref 34–48)
HGB BLD-MCNC: 13.5 G/DL (ref 11.5–15.5)
LYMPHOCYTES # BLD: 0.9 E9/L (ref 1.5–4)
LYMPHOCYTES NFR BLD: 12.8 % (ref 20–42)
MCH RBC QN AUTO: 46.4 PG (ref 26–35)
MCHC RBC AUTO-ENTMCNC: 35.6 % (ref 32–34.5)
MCV RBC AUTO: 130.2 FL (ref 80–99.9)
MONOCYTES # BLD: 0 E9/L (ref 0.1–0.95)
MONOCYTES NFR BLD: 2.3 % (ref 2–12)
NEUTROPHILS # BLD: 5.87 E9/L (ref 1.8–7.3)
NEUTS SEG NFR BLD: 84.6 % (ref 43–80)
OVALOCYTES: ABNORMAL
PLATELET # BLD AUTO: 153 E9/L (ref 130–450)
PMV BLD AUTO: 10.5 FL (ref 7–12)
POLYCHROMASIA: ABNORMAL
RBC # BLD AUTO: 2.91 E12/L (ref 3.5–5.5)
TEAR DROP CELLS: ABNORMAL
VACUOLATED NEUTROPHILS: ABNORMAL
WBC # BLD: 6.9 E9/L (ref 4.5–11.5)

## 2023-05-03 ENCOUNTER — OFFICE VISIT (OUTPATIENT)
Dept: ONCOLOGY | Age: 88
End: 2023-05-03
Payer: MEDICARE

## 2023-05-03 ENCOUNTER — HOSPITAL ENCOUNTER (OUTPATIENT)
Dept: INFUSION THERAPY | Age: 88
Discharge: HOME | End: 2023-05-03

## 2023-05-03 VITALS
DIASTOLIC BLOOD PRESSURE: 79 MMHG | HEIGHT: 64 IN | BODY MASS INDEX: 25.8 KG/M2 | OXYGEN SATURATION: 96 % | HEART RATE: 80 BPM | SYSTOLIC BLOOD PRESSURE: 146 MMHG | TEMPERATURE: 98.7 F | WEIGHT: 151.1 LBS

## 2023-05-03 DIAGNOSIS — N18.30 STAGE 3 CHRONIC KIDNEY DISEASE, UNSPECIFIED WHETHER STAGE 3A OR 3B CKD (HCC): ICD-10-CM

## 2023-05-03 DIAGNOSIS — D45 POLYCYTHEMIA VERA (HCC): Primary | ICD-10-CM

## 2023-05-03 PROCEDURE — 99212 OFFICE O/P EST SF 10 MIN: CPT

## 2023-05-03 RX ORDER — HYDROXYUREA 500 MG/1
500 CAPSULE ORAL 2 TIMES DAILY
Qty: 60 CAPSULE | Refills: 1 | Status: SHIPPED | OUTPATIENT
Start: 2023-05-03

## 2023-06-16 RX ORDER — ALENDRONATE SODIUM 70 MG/1
TABLET ORAL
Qty: 12 TABLET | Refills: 3 | Status: SHIPPED | OUTPATIENT
Start: 2023-06-16

## 2023-07-11 DIAGNOSIS — D45 POLYCYTHEMIA VERA (HCC): ICD-10-CM

## 2023-07-11 DIAGNOSIS — N18.30 STAGE 3 CHRONIC KIDNEY DISEASE, UNSPECIFIED WHETHER STAGE 3A OR 3B CKD (HCC): ICD-10-CM

## 2023-07-11 LAB
ALBUMIN SERPL-MCNC: 4.1 G/DL (ref 3.5–5.2)
ALP SERPL-CCNC: 56 U/L (ref 35–104)
ALT SERPL-CCNC: 13 U/L (ref 0–32)
ANION GAP SERPL CALCULATED.3IONS-SCNC: 11 MMOL/L (ref 7–16)
AST SERPL-CCNC: 22 U/L (ref 0–31)
BILIRUB SERPL-MCNC: 0.7 MG/DL (ref 0–1.2)
BUN SERPL-MCNC: 21 MG/DL (ref 6–23)
CALCIUM SERPL-MCNC: 9.6 MG/DL (ref 8.6–10.2)
CHLORIDE SERPL-SCNC: 103 MMOL/L (ref 98–107)
CO2 SERPL-SCNC: 24 MMOL/L (ref 22–29)
CREAT SERPL-MCNC: 1.1 MG/DL (ref 0.5–1)
GLUCOSE SERPL-MCNC: 93 MG/DL (ref 74–99)
POTASSIUM SERPL-SCNC: 4.7 MMOL/L (ref 3.5–5)
PROT SERPL-MCNC: 6.4 G/DL (ref 6.4–8.3)
SODIUM SERPL-SCNC: 138 MMOL/L (ref 132–146)

## 2023-07-11 RX ORDER — LISINOPRIL 10 MG/1
TABLET ORAL
Qty: 90 TABLET | Refills: 0 | Status: SHIPPED | OUTPATIENT
Start: 2023-07-11

## 2023-07-12 ENCOUNTER — OFFICE VISIT (OUTPATIENT)
Dept: ONCOLOGY | Age: 88
End: 2023-07-12
Payer: MEDICARE

## 2023-07-12 ENCOUNTER — HOSPITAL ENCOUNTER (OUTPATIENT)
Dept: INFUSION THERAPY | Age: 88
Discharge: HOME | End: 2023-07-12
Payer: MEDICARE

## 2023-07-12 VITALS
OXYGEN SATURATION: 96 % | BODY MASS INDEX: 25.64 KG/M2 | HEART RATE: 78 BPM | HEIGHT: 64 IN | SYSTOLIC BLOOD PRESSURE: 139 MMHG | DIASTOLIC BLOOD PRESSURE: 81 MMHG | WEIGHT: 150.2 LBS | TEMPERATURE: 98.4 F

## 2023-07-12 DIAGNOSIS — D45 POLYCYTHEMIA VERA (HCC): Primary | ICD-10-CM

## 2023-07-12 LAB
BASOPHILS # BLD: 0.06 E9/L (ref 0–0.2)
BASOPHILS NFR BLD: 1.1 % (ref 0–2)
EOSINOPHIL # BLD: 0.04 E9/L (ref 0.05–0.5)
EOSINOPHIL NFR BLD: 0.7 % (ref 0–6)
ERYTHROCYTE [DISTWIDTH] IN BLOOD BY AUTOMATED COUNT: 16.1 FL (ref 11.5–15)
HCT VFR BLD AUTO: 37.6 % (ref 34–48)
HGB BLD-MCNC: 12.9 G/DL (ref 11.5–15.5)
IMM GRANULOCYTES # BLD: 0.02 E9/L
IMM GRANULOCYTES NFR BLD: 0.4 % (ref 0–5)
LYMPHOCYTES # BLD: 0.83 E9/L (ref 1.5–4)
LYMPHOCYTES NFR BLD: 15.1 % (ref 20–42)
MCH RBC QN AUTO: 45.9 PG (ref 26–35)
MCHC RBC AUTO-ENTMCNC: 34.3 % (ref 32–34.5)
MCV RBC AUTO: 133.8 FL (ref 80–99.9)
MONOCYTES # BLD: 0.11 E9/L (ref 0.1–0.95)
MONOCYTES NFR BLD: 2 % (ref 2–12)
NEUTROPHILS # BLD: 4.45 E9/L (ref 1.8–7.3)
NEUTS SEG NFR BLD: 80.7 % (ref 43–80)
PLATELET # BLD AUTO: 164 E9/L (ref 130–450)
PMV BLD AUTO: 10.5 FL (ref 7–12)
RBC # BLD AUTO: 2.81 E12/L (ref 3.5–5.5)
RBC MORPH BLD: NORMAL
WBC # BLD: 5.5 E9/L (ref 4.5–11.5)

## 2023-07-12 PROCEDURE — 99212 OFFICE O/P EST SF 10 MIN: CPT

## 2023-07-12 RX ORDER — HYDROXYUREA 500 MG/1
500 CAPSULE ORAL 2 TIMES DAILY
Qty: 60 CAPSULE | Refills: 1 | Status: SHIPPED | OUTPATIENT
Start: 2023-07-12

## 2023-07-18 ENCOUNTER — TELEPHONE (OUTPATIENT)
Dept: PRIMARY CARE CLINIC | Age: 88
End: 2023-07-18

## 2023-07-18 DIAGNOSIS — E53.8 VITAMIN B12 DEFICIENCY: Primary | ICD-10-CM

## 2023-07-18 DIAGNOSIS — I10 PRIMARY HYPERTENSION: ICD-10-CM

## 2023-07-18 DIAGNOSIS — R73.9 HYPERGLYCEMIA: ICD-10-CM

## 2023-07-18 DIAGNOSIS — E78.5 HYPERLIPIDEMIA, UNSPECIFIED HYPERLIPIDEMIA TYPE: ICD-10-CM

## 2023-07-18 DIAGNOSIS — E07.9 THYROID DISEASE: ICD-10-CM

## 2023-07-18 NOTE — TELEPHONE ENCOUNTER
Patient called in asking if she was due for labs for Dr. Carrington Pulse    The last I saw was that she was drawn in April and due for a re-check in 6 months, unsure if she may be needing labs this month? ?    Patient states that she received a letter in the mail that she was due this month? ?      Please, advise

## 2023-07-18 NOTE — TELEPHONE ENCOUNTER
PC to pt, Pt due for labs in October. Orders will be placed. Pt stated she will go a few days prior to appt to have labs done.

## 2023-08-16 RX ORDER — ATORVASTATIN CALCIUM 40 MG/1
TABLET, FILM COATED ORAL
Qty: 90 TABLET | Refills: 1 | Status: SHIPPED | OUTPATIENT
Start: 2023-08-16

## 2023-08-25 RX ORDER — PANTOPRAZOLE SODIUM 40 MG/1
40 TABLET, DELAYED RELEASE ORAL DAILY
Qty: 90 TABLET | Refills: 0 | Status: SHIPPED | OUTPATIENT
Start: 2023-08-25

## 2023-08-25 RX ORDER — MONTELUKAST SODIUM 4 MG/1
TABLET, CHEWABLE ORAL
Qty: 360 TABLET | Refills: 3 | Status: SHIPPED | OUTPATIENT
Start: 2023-08-25

## 2023-08-25 RX ORDER — ESCITALOPRAM OXALATE 20 MG/1
20 TABLET ORAL DAILY
Qty: 90 TABLET | Refills: 0 | Status: SHIPPED | OUTPATIENT
Start: 2023-08-25

## 2023-09-11 DIAGNOSIS — D45 POLYCYTHEMIA VERA (HCC): ICD-10-CM

## 2023-09-12 LAB
ABO/RH: NORMAL
ALBUMIN SERPL-MCNC: 3.9 G/DL (ref 3.5–5.2)
ALP BLD-CCNC: 64 U/L (ref 35–104)
ALT SERPL-CCNC: 13 U/L (ref 0–32)
ANION GAP SERPL CALCULATED.3IONS-SCNC: 13 MMOL/L (ref 7–16)
ANTIBODY SCREEN: NEGATIVE
ARM BAND NUMBER: NORMAL
AST SERPL-CCNC: 20 U/L (ref 0–31)
BASOPHILS ABSOLUTE: 0.22 K/UL (ref 0–0.2)
BASOPHILS RELATIVE PERCENT: 4 % (ref 0–2)
BILIRUB SERPL-MCNC: 0.8 MG/DL (ref 0–1.2)
BLOOD BANK SAMPLE EXPIRATION: NORMAL
BUN BLDV-MCNC: 16 MG/DL (ref 6–23)
CALCIUM SERPL-MCNC: 9.3 MG/DL (ref 8.6–10.2)
CHLORIDE BLD-SCNC: 101 MMOL/L (ref 98–107)
CO2: 22 MMOL/L (ref 22–29)
CREAT SERPL-MCNC: 1.2 MG/DL (ref 0.5–1)
EOSINOPHILS ABSOLUTE: 0 K/UL (ref 0.05–0.5)
EOSINOPHILS RELATIVE PERCENT: 0 % (ref 0–6)
GFR SERPL CREATININE-BSD FRML MDRD: 44 ML/MIN/1.73M2
GLUCOSE BLD-MCNC: 190 MG/DL (ref 74–99)
HCT VFR BLD CALC: 37.8 % (ref 34–48)
HEMOGLOBIN: 13.3 G/DL (ref 11.5–15.5)
LYMPHOCYTES ABSOLUTE: 0.55 K/UL (ref 1.5–4)
LYMPHOCYTES RELATIVE PERCENT: 9 % (ref 20–42)
MCH RBC QN AUTO: 46.8 PG (ref 26–35)
MCHC RBC AUTO-ENTMCNC: 35.2 G/DL (ref 32–34.5)
MCV RBC AUTO: 133.1 FL (ref 80–99.9)
MONOCYTES ABSOLUTE: 0.17 K/UL (ref 0.1–0.95)
MONOCYTES RELATIVE PERCENT: 3 % (ref 2–12)
MYELOCYTES ABSOLUTE COUNT: 0.06 K/UL
MYELOCYTES: 1 %
NEUTROPHILS ABSOLUTE: 5.31 K/UL (ref 1.8–7.3)
NEUTROPHILS RELATIVE PERCENT: 84 % (ref 43–80)
PDW BLD-RTO: 16.1 % (ref 11.5–15)
PLATELET # BLD: 163 K/UL (ref 130–450)
PMV BLD AUTO: 10.7 FL (ref 7–12)
POTASSIUM SERPL-SCNC: 4.6 MMOL/L (ref 3.5–5)
RBC # BLD: 2.84 M/UL (ref 3.5–5.5)
RBC # BLD: ABNORMAL 10*6/UL
SODIUM BLD-SCNC: 136 MMOL/L (ref 132–146)
TOTAL PROTEIN: 6.2 G/DL (ref 6.4–8.3)
WBC # BLD: 6.3 K/UL (ref 4.5–11.5)

## 2023-09-13 ENCOUNTER — HOSPITAL ENCOUNTER (OUTPATIENT)
Dept: INFUSION THERAPY | Age: 88
Discharge: HOME | End: 2023-09-13

## 2023-09-13 ENCOUNTER — OFFICE VISIT (OUTPATIENT)
Dept: ONCOLOGY | Age: 88
End: 2023-09-13
Payer: MEDICARE

## 2023-09-13 VITALS
DIASTOLIC BLOOD PRESSURE: 63 MMHG | TEMPERATURE: 97.1 F | OXYGEN SATURATION: 98 % | HEART RATE: 71 BPM | WEIGHT: 147.5 LBS | SYSTOLIC BLOOD PRESSURE: 117 MMHG | BODY MASS INDEX: 25.18 KG/M2 | HEIGHT: 64 IN

## 2023-09-13 DIAGNOSIS — D45 POLYCYTHEMIA VERA (HCC): Primary | ICD-10-CM

## 2023-09-13 PROCEDURE — 99212 OFFICE O/P EST SF 10 MIN: CPT

## 2023-09-13 RX ORDER — HYDROXYUREA 500 MG/1
500 CAPSULE ORAL 2 TIMES DAILY
Qty: 60 CAPSULE | Refills: 1 | Status: SHIPPED | OUTPATIENT
Start: 2023-09-13

## 2023-09-18 DIAGNOSIS — M15.9 GENERALIZED OSTEOARTHRITIS: ICD-10-CM

## 2023-09-18 DIAGNOSIS — D45 POLYCYTHEMIA VERA (HCC): ICD-10-CM

## 2023-09-18 RX ORDER — HYDROXYUREA 500 MG/1
500 CAPSULE ORAL 2 TIMES DAILY
Qty: 180 CAPSULE | Refills: 0 | Status: SHIPPED | OUTPATIENT
Start: 2023-09-18 | End: 2023-12-17

## 2023-09-18 RX ORDER — TRAMADOL HYDROCHLORIDE 50 MG/1
TABLET ORAL
Qty: 30 TABLET | OUTPATIENT
Start: 2023-09-18

## 2023-09-19 ENCOUNTER — TELEPHONE (OUTPATIENT)
Dept: PRIMARY CARE CLINIC | Age: 88
End: 2023-09-19

## 2023-09-19 NOTE — TELEPHONE ENCOUNTER
----- Message from Tobi Sarika sent at 9/19/2023  9:08 AM EDT -----  Subject: Message to Provider    QUESTIONS  Information for Provider? Patient is wanting to know if she needs to fast   for her labs on 10/03 along with a few other lab related questions, please   call patient back to discuss. ---------------------------------------------------------------------------  --------------  Wai CESARONESIMO  [phone]; OK to leave message on voicemail  ---------------------------------------------------------------------------  --------------  SCRIPT ANSWERS  Relationship to Patient?  Self

## 2023-09-19 NOTE — TELEPHONE ENCOUNTER
PC to patient in regards to the following:     Patient is wanting to know if she needs to fast   for her labs on 10/03 along with a few other lab related questions, please   call patient back to discuss. Informed patient that they are fasting labs. Also let her know that the orders have already been placed. Patient stated that she would obtain labs in [place] before her visit on 10/3/23.

## 2023-09-20 ENCOUNTER — TELEPHONE (OUTPATIENT)
Dept: PRIMARY CARE CLINIC | Age: 88
End: 2023-09-20

## 2023-09-20 NOTE — TELEPHONE ENCOUNTER
Pt is requesting orders for mammogram and Dexa Scan to be done at [facility].    Please advise.

## 2023-09-21 DIAGNOSIS — Z12.31 ENCOUNTER FOR SCREENING MAMMOGRAM FOR MALIGNANT NEOPLASM OF BREAST: Primary | ICD-10-CM

## 2023-09-21 DIAGNOSIS — M81.0 AGE-RELATED OSTEOPOROSIS WITHOUT CURRENT PATHOLOGICAL FRACTURE: ICD-10-CM

## 2023-09-21 NOTE — TELEPHONE ENCOUNTER
PC to pt notifying orders for DEXA scan and Mammogram were placed and are due after Oct 10th. Pt voiced understanding and will call [facility] to schedule testing.

## 2023-10-03 ENCOUNTER — OFFICE VISIT (OUTPATIENT)
Dept: PRIMARY CARE CLINIC | Age: 88
End: 2023-10-03
Payer: MEDICARE

## 2023-10-03 VITALS
HEIGHT: 64 IN | WEIGHT: 144 LBS | DIASTOLIC BLOOD PRESSURE: 62 MMHG | BODY MASS INDEX: 24.59 KG/M2 | OXYGEN SATURATION: 98 % | SYSTOLIC BLOOD PRESSURE: 110 MMHG | TEMPERATURE: 97.2 F | HEART RATE: 79 BPM

## 2023-10-03 DIAGNOSIS — M85.80 OSTEOPENIA, UNSPECIFIED LOCATION: ICD-10-CM

## 2023-10-03 DIAGNOSIS — E78.5 HYPERLIPIDEMIA, UNSPECIFIED HYPERLIPIDEMIA TYPE: ICD-10-CM

## 2023-10-03 DIAGNOSIS — E07.9 THYROID DISEASE: ICD-10-CM

## 2023-10-03 DIAGNOSIS — N18.30 STAGE 3 CHRONIC KIDNEY DISEASE, UNSPECIFIED WHETHER STAGE 3A OR 3B CKD (HCC): ICD-10-CM

## 2023-10-03 DIAGNOSIS — E53.8 VITAMIN B12 DEFICIENCY: ICD-10-CM

## 2023-10-03 DIAGNOSIS — I10 PRIMARY HYPERTENSION: Primary | ICD-10-CM

## 2023-10-03 DIAGNOSIS — E55.9 VITAMIN D INSUFFICIENCY: ICD-10-CM

## 2023-10-03 DIAGNOSIS — R73.9 HYPERGLYCEMIA: ICD-10-CM

## 2023-10-03 LAB — HBA1C MFR BLD: 5.4 %

## 2023-10-03 PROCEDURE — 1036F TOBACCO NON-USER: CPT | Performed by: FAMILY MEDICINE

## 2023-10-03 PROCEDURE — 1123F ACP DISCUSS/DSCN MKR DOCD: CPT | Performed by: FAMILY MEDICINE

## 2023-10-03 PROCEDURE — 1090F PRES/ABSN URINE INCON ASSESS: CPT | Performed by: FAMILY MEDICINE

## 2023-10-03 PROCEDURE — 83036 HEMOGLOBIN GLYCOSYLATED A1C: CPT | Performed by: FAMILY MEDICINE

## 2023-10-03 PROCEDURE — 36415 COLL VENOUS BLD VENIPUNCTURE: CPT | Performed by: FAMILY MEDICINE

## 2023-10-03 PROCEDURE — G8427 DOCREV CUR MEDS BY ELIG CLIN: HCPCS | Performed by: FAMILY MEDICINE

## 2023-10-03 PROCEDURE — 99214 OFFICE O/P EST MOD 30 MIN: CPT | Performed by: FAMILY MEDICINE

## 2023-10-03 PROCEDURE — G8420 CALC BMI NORM PARAMETERS: HCPCS | Performed by: FAMILY MEDICINE

## 2023-10-03 PROCEDURE — G8484 FLU IMMUNIZE NO ADMIN: HCPCS | Performed by: FAMILY MEDICINE

## 2023-10-03 RX ORDER — TRAMADOL HYDROCHLORIDE 50 MG/1
50 TABLET ORAL EVERY 6 HOURS PRN
COMMUNITY

## 2023-10-03 RX ORDER — DOCUSATE SODIUM 100 MG/1
100 CAPSULE, LIQUID FILLED ORAL 2 TIMES DAILY PRN
COMMUNITY

## 2023-10-03 ASSESSMENT — ENCOUNTER SYMPTOMS
NAUSEA: 0
EYE PAIN: 0
ABDOMINAL PAIN: 0
COUGH: 0
DIARRHEA: 0
WHEEZING: 0
EYE ITCHING: 0
PHOTOPHOBIA: 0
SINUS PRESSURE: 0
BLOOD IN STOOL: 0
CONSTIPATION: 0
EYE DISCHARGE: 0
COLOR CHANGE: 0
SORE THROAT: 0
VOMITING: 0
SHORTNESS OF BREATH: 0
RHINORRHEA: 0
ABDOMINAL DISTENTION: 0
FACIAL SWELLING: 0

## 2023-10-03 NOTE — PROGRESS NOTES
Venipuncture was obtained from left arm. Patient tolerated the procedure without complications or complaints.
excessive creatine ingestion, or following therapy that affects   renal tubular secretion. GFR    Date Value Ref Range Status   09/02/2022 57  Final     Calcium   Date Value Ref Range Status   09/11/2023 9.3 8.6 - 10.2 mg/dL Final     Total Protein   Date Value Ref Range Status   09/11/2023 6.2 (L) 6.4 - 8.3 g/dL Final     Albumin   Date Value Ref Range Status   09/11/2023 3.9 3.5 - 5.2 g/dL Final     Total Bilirubin   Date Value Ref Range Status   09/11/2023 0.8 0.0 - 1.2 mg/dL Final     Alkaline Phosphatase   Date Value Ref Range Status   09/11/2023 64 35 - 104 U/L Final     ALT   Date Value Ref Range Status   09/11/2023 13 0 - 32 U/L Final     AST   Date Value Ref Range Status   09/11/2023 20 0 - 31 U/L Final       TSH  Lab Results   Component Value Date    TSH 3.39 10/03/2023       A1C  Lab Results   Component Value Date    LABA1C 5.4 10/03/2023       LIPID  Lab Results   Component Value Date    CHOL 115 04/03/2023    TRIG 108 04/03/2023    HDL 55 04/03/2023    LDLCALC 38 04/03/2023    LABVLDL 22 04/03/2023      Results for POC orders placed in visit on 10/03/23   POCT glycosylated hemoglobin (Hb A1C)   Result Value Ref Range    Hemoglobin A1C 5.4 %       An electronic signature was used to authenticate this note.     --Benjie Diaz DO

## 2023-10-04 LAB
TSH SERPL DL<=0.05 MIU/L-ACNC: 3.39 UIU/ML (ref 0.27–4.2)
VITAMIN B-12: 632 PG/ML (ref 211–946)
VITAMIN D 25-HYDROXY: 36.8 NG/ML (ref 30–100)

## 2023-10-10 PROCEDURE — G0008 ADMIN INFLUENZA VIRUS VAC: HCPCS | Performed by: FAMILY MEDICINE

## 2023-10-10 PROCEDURE — 90694 VACC AIIV4 NO PRSRV 0.5ML IM: CPT | Performed by: FAMILY MEDICINE

## 2023-11-06 ENCOUNTER — HOSPITAL ENCOUNTER (OUTPATIENT)
Dept: MAMMOGRAPHY | Age: 88
Discharge: HOME | End: 2023-11-08
Attending: FAMILY MEDICINE
Payer: MEDICARE

## 2023-11-06 VITALS — BODY MASS INDEX: 24.59 KG/M2 | WEIGHT: 144 LBS | HEIGHT: 64 IN

## 2023-11-06 DIAGNOSIS — Z12.31 ENCOUNTER FOR SCREENING MAMMOGRAM FOR MALIGNANT NEOPLASM OF BREAST: ICD-10-CM

## 2023-11-06 DIAGNOSIS — M81.0 AGE-RELATED OSTEOPOROSIS WITHOUT CURRENT PATHOLOGICAL FRACTURE: ICD-10-CM

## 2023-11-06 PROCEDURE — 77063 BREAST TOMOSYNTHESIS BI: CPT

## 2023-11-06 PROCEDURE — 77080 DXA BONE DENSITY AXIAL: CPT

## 2023-11-12 DIAGNOSIS — D45 POLYCYTHEMIA VERA (HCC): ICD-10-CM

## 2023-11-14 DIAGNOSIS — D45 POLYCYTHEMIA VERA (HCC): ICD-10-CM

## 2023-11-14 LAB
ABSOLUTE IMMATURE GRANULOCYTE: 0.03 K/UL (ref 0–0.58)
ALBUMIN SERPL-MCNC: 3.9 G/DL (ref 3.5–5.2)
ALP BLD-CCNC: 55 U/L (ref 35–104)
ALT SERPL-CCNC: 10 U/L (ref 0–32)
ANION GAP SERPL CALCULATED.3IONS-SCNC: 10 MMOL/L (ref 7–16)
AST SERPL-CCNC: 24 U/L (ref 0–31)
BASOPHILS ABSOLUTE: 0.06 K/UL (ref 0–0.2)
BASOPHILS RELATIVE PERCENT: 1 % (ref 0–2)
BILIRUB SERPL-MCNC: 0.7 MG/DL (ref 0–1.2)
BUN BLDV-MCNC: 16 MG/DL (ref 6–23)
CALCIUM SERPL-MCNC: 9.3 MG/DL (ref 8.6–10.2)
CHLORIDE BLD-SCNC: 104 MMOL/L (ref 98–107)
CO2: 24 MMOL/L (ref 22–29)
CREAT SERPL-MCNC: 1.1 MG/DL (ref 0.5–1)
EOSINOPHILS ABSOLUTE: 0.02 K/UL (ref 0.05–0.5)
EOSINOPHILS RELATIVE PERCENT: 0 % (ref 0–6)
GFR SERPL CREATININE-BSD FRML MDRD: 49 ML/MIN/1.73M2
GLUCOSE BLD-MCNC: 124 MG/DL (ref 74–99)
HCT VFR BLD CALC: 36.6 % (ref 34–48)
HEMOGLOBIN: 12.3 G/DL (ref 11.5–15.5)
IMMATURE GRANULOCYTES: 1 % (ref 0–5)
LYMPHOCYTES ABSOLUTE: 0.75 K/UL (ref 1.5–4)
LYMPHOCYTES RELATIVE PERCENT: 13 % (ref 20–42)
MCH RBC QN AUTO: 43.9 PG (ref 26–35)
MCHC RBC AUTO-ENTMCNC: 33.6 G/DL (ref 32–34.5)
MCV RBC AUTO: 130.7 FL (ref 80–99.9)
MONOCYTES ABSOLUTE: 0.11 K/UL (ref 0.1–0.95)
MONOCYTES RELATIVE PERCENT: 2 % (ref 2–12)
NEUTROPHILS ABSOLUTE: 4.74 K/UL (ref 1.8–7.3)
NEUTROPHILS RELATIVE PERCENT: 83 % (ref 43–80)
PDW BLD-RTO: 14.6 % (ref 11.5–15)
PLATELET # BLD: 132 K/UL (ref 130–450)
PMV BLD AUTO: 11.1 FL (ref 7–12)
POTASSIUM SERPL-SCNC: 4.2 MMOL/L (ref 3.5–5)
RBC # BLD: 2.8 M/UL (ref 3.5–5.5)
RBC # BLD: ABNORMAL 10*6/UL
SODIUM BLD-SCNC: 138 MMOL/L (ref 132–146)
TOTAL PROTEIN: 6.3 G/DL (ref 6.4–8.3)
WBC # BLD: 5.7 K/UL (ref 4.5–11.5)

## 2023-11-15 ENCOUNTER — HOSPITAL ENCOUNTER (OUTPATIENT)
Dept: INFUSION THERAPY | Age: 88
End: 2023-11-15

## 2023-11-16 RX ORDER — HYDROXYUREA 500 MG/1
500 CAPSULE ORAL 2 TIMES DAILY
Qty: 60 CAPSULE | OUTPATIENT
Start: 2023-11-16

## 2023-12-06 RX ORDER — PANTOPRAZOLE SODIUM 40 MG/1
40 TABLET, DELAYED RELEASE ORAL DAILY
Qty: 90 TABLET | Refills: 3 | Status: SHIPPED | OUTPATIENT
Start: 2023-12-06

## 2023-12-06 RX ORDER — ESCITALOPRAM OXALATE 20 MG/1
20 TABLET ORAL DAILY
Qty: 90 TABLET | Refills: 3 | Status: SHIPPED | OUTPATIENT
Start: 2023-12-06

## 2023-12-28 DIAGNOSIS — D45 POLYCYTHEMIA VERA (HCC): ICD-10-CM

## 2023-12-28 RX ORDER — HYDROXYUREA 500 MG/1
500 CAPSULE ORAL 2 TIMES DAILY
Qty: 60 CAPSULE | Refills: 1 | Status: SHIPPED | OUTPATIENT
Start: 2023-12-28 | End: 2024-02-26

## 2023-12-28 RX ORDER — HYDROXYUREA 500 MG/1
500 CAPSULE ORAL 2 TIMES DAILY
Qty: 180 CAPSULE | OUTPATIENT
Start: 2023-12-28

## 2024-01-09 ENCOUNTER — TELEPHONE (OUTPATIENT)
Dept: PRIMARY CARE CLINIC | Age: 89
End: 2024-01-09

## 2024-01-09 ENCOUNTER — HOSPITAL ENCOUNTER (EMERGENCY)
Age: 89
Discharge: HOME | End: 2024-01-10
Attending: EMERGENCY MEDICINE
Payer: MEDICARE

## 2024-01-09 DIAGNOSIS — N30.90 CYSTITIS: Primary | ICD-10-CM

## 2024-01-09 DIAGNOSIS — N39.0 URINARY TRACT INFECTION WITHOUT HEMATURIA, SITE UNSPECIFIED: Primary | ICD-10-CM

## 2024-01-09 DIAGNOSIS — R11.2 NAUSEA AND VOMITING, UNSPECIFIED VOMITING TYPE: ICD-10-CM

## 2024-01-09 LAB
ALBUMIN SERPL-MCNC: 3.7 G/DL (ref 3.5–5.2)
ALP SERPL-CCNC: 59 U/L (ref 35–104)
ALT SERPL-CCNC: 11 U/L (ref 0–32)
ANION GAP SERPL CALCULATED.3IONS-SCNC: 14 MMOL/L (ref 7–16)
AST SERPL-CCNC: 21 U/L (ref 0–31)
BILIRUB SERPL-MCNC: 1 MG/DL (ref 0–1.2)
BUN SERPL-MCNC: 13 MG/DL (ref 6–23)
CALCIUM SERPL-MCNC: 8.9 MG/DL (ref 8.6–10.2)
CHLORIDE SERPL-SCNC: 101 MMOL/L (ref 98–107)
CO2 SERPL-SCNC: 20 MMOL/L (ref 22–29)
CREAT SERPL-MCNC: 0.9 MG/DL (ref 0.5–1)
GFR SERPL CREATININE-BSD FRML MDRD: >60 ML/MIN/1.73M2
GLUCOSE SERPL-MCNC: 166 MG/DL (ref 74–99)
LACTATE BLDV-SCNC: 2.1 MMOL/L (ref 0.5–2.2)
LIPASE SERPL-CCNC: 20 U/L (ref 13–60)
POTASSIUM SERPL-SCNC: 4 MMOL/L (ref 3.5–5)
PROT SERPL-MCNC: 5.9 G/DL (ref 6.4–8.3)
SODIUM SERPL-SCNC: 135 MMOL/L (ref 132–146)

## 2024-01-09 PROCEDURE — 80053 COMPREHEN METABOLIC PANEL: CPT

## 2024-01-09 PROCEDURE — 81001 URINALYSIS AUTO W/SCOPE: CPT

## 2024-01-09 PROCEDURE — 87077 CULTURE AEROBIC IDENTIFY: CPT

## 2024-01-09 PROCEDURE — 99285 EMERGENCY DEPT VISIT HI MDM: CPT

## 2024-01-09 PROCEDURE — 83605 ASSAY OF LACTIC ACID: CPT

## 2024-01-09 PROCEDURE — 2580000003 HC RX 258: Performed by: STUDENT IN AN ORGANIZED HEALTH CARE EDUCATION/TRAINING PROGRAM

## 2024-01-09 PROCEDURE — 96375 TX/PRO/DX INJ NEW DRUG ADDON: CPT

## 2024-01-09 PROCEDURE — 87086 URINE CULTURE/COLONY COUNT: CPT

## 2024-01-09 PROCEDURE — 85025 COMPLETE CBC W/AUTO DIFF WBC: CPT

## 2024-01-09 PROCEDURE — 96374 THER/PROPH/DIAG INJ IV PUSH: CPT

## 2024-01-09 PROCEDURE — 83690 ASSAY OF LIPASE: CPT

## 2024-01-09 RX ORDER — CEPHALEXIN 500 MG/1
500 CAPSULE ORAL 2 TIMES DAILY
Qty: 14 CAPSULE | Refills: 0 | Status: SHIPPED
Start: 2024-01-09 | End: 2024-01-10

## 2024-01-09 RX ORDER — 0.9 % SODIUM CHLORIDE 0.9 %
1000 INTRAVENOUS SOLUTION INTRAVENOUS ONCE
Status: COMPLETED | OUTPATIENT
Start: 2024-01-09 | End: 2024-01-10

## 2024-01-09 RX ADMIN — SODIUM CHLORIDE 1000 ML: 9 INJECTION, SOLUTION INTRAVENOUS at 23:29

## 2024-01-09 ASSESSMENT — PAIN - FUNCTIONAL ASSESSMENT: PAIN_FUNCTIONAL_ASSESSMENT: NONE - DENIES PAIN

## 2024-01-09 ASSESSMENT — LIFESTYLE VARIABLES
HOW OFTEN DO YOU HAVE A DRINK CONTAINING ALCOHOL: NEVER
HOW MANY STANDARD DRINKS CONTAINING ALCOHOL DO YOU HAVE ON A TYPICAL DAY: PATIENT DOES NOT DRINK

## 2024-01-09 NOTE — TELEPHONE ENCOUNTER
PC from pts son, Ric.  Stated pt was having dry heaves last night and today took an at home UTI test and stated it read positive.      Pt is not having any urinary issues, but stated pt is prone to UTI's.    Son does not want to take pt to Urgent care, all pt wants to do is lay down.    Uses [facility] [address] [facility].    Please advise.

## 2024-01-10 ENCOUNTER — APPOINTMENT (OUTPATIENT)
Dept: CT IMAGING | Age: 89
End: 2024-01-10
Payer: MEDICARE

## 2024-01-10 VITALS
HEIGHT: 64 IN | RESPIRATION RATE: 15 BRPM | BODY MASS INDEX: 23.9 KG/M2 | TEMPERATURE: 98 F | HEART RATE: 85 BPM | WEIGHT: 140 LBS | OXYGEN SATURATION: 94 % | DIASTOLIC BLOOD PRESSURE: 85 MMHG | SYSTOLIC BLOOD PRESSURE: 170 MMHG

## 2024-01-10 LAB
BACTERIA URNS QL MICRO: ABNORMAL
BASOPHILS # BLD: 0.13 K/UL (ref 0–0.2)
BASOPHILS NFR BLD: 2 % (ref 0–2)
BILIRUB UR QL STRIP: NEGATIVE
CLARITY UR: ABNORMAL
COLOR UR: YELLOW
EOSINOPHIL # BLD: 0 K/UL (ref 0.05–0.5)
EOSINOPHILS RELATIVE PERCENT: 0 % (ref 0–6)
ERYTHROCYTE [DISTWIDTH] IN BLOOD BY AUTOMATED COUNT: 14.6 % (ref 11.5–15)
GLUCOSE UR STRIP-MCNC: NEGATIVE MG/DL
HCT VFR BLD AUTO: 38.8 % (ref 34–48)
HGB BLD-MCNC: 13.7 G/DL (ref 11.5–15.5)
HGB UR QL STRIP.AUTO: ABNORMAL
KETONES UR STRIP-MCNC: 15 MG/DL
LEUKOCYTE ESTERASE UR QL STRIP: NEGATIVE
LYMPHOCYTES NFR BLD: 0.51 K/UL (ref 1.5–4)
LYMPHOCYTES RELATIVE PERCENT: 7 % (ref 20–42)
MCH RBC QN AUTO: 40.1 PG (ref 26–35)
MCHC RBC AUTO-ENTMCNC: 35.3 G/DL (ref 32–34.5)
MCV RBC AUTO: 113.5 FL (ref 80–99.9)
MONOCYTES NFR BLD: 0 % (ref 2–12)
MONOCYTES NFR BLD: 0 K/UL (ref 0.1–0.95)
NEUTROPHILS NFR BLD: 91 % (ref 43–80)
NEUTS SEG NFR BLD: 6.76 K/UL (ref 1.8–7.3)
NITRITE UR QL STRIP: POSITIVE
PH UR STRIP: 6 [PH] (ref 5–9)
PLATELET # BLD AUTO: 206 K/UL (ref 130–450)
PMV BLD AUTO: 10.6 FL (ref 7–12)
PROT UR STRIP-MCNC: 30 MG/DL
RBC # BLD AUTO: 3.42 M/UL (ref 3.5–5.5)
RBC # BLD: ABNORMAL 10*6/UL
RBC #/AREA URNS HPF: ABNORMAL /HPF
SP GR UR STRIP: 1.02 (ref 1–1.03)
UROBILINOGEN UR STRIP-ACNC: 0.2 EU/DL (ref 0–1)
WBC #/AREA URNS HPF: ABNORMAL /HPF
WBC OTHER # BLD: 7.4 K/UL (ref 4.5–11.5)

## 2024-01-10 PROCEDURE — 6360000004 HC RX CONTRAST MEDICATION: Performed by: RADIOLOGY

## 2024-01-10 PROCEDURE — 6360000002 HC RX W HCPCS: Performed by: STUDENT IN AN ORGANIZED HEALTH CARE EDUCATION/TRAINING PROGRAM

## 2024-01-10 PROCEDURE — 2580000003 HC RX 258: Performed by: STUDENT IN AN ORGANIZED HEALTH CARE EDUCATION/TRAINING PROGRAM

## 2024-01-10 PROCEDURE — 74177 CT ABD & PELVIS W/CONTRAST: CPT

## 2024-01-10 RX ORDER — CEFDINIR 300 MG/1
300 CAPSULE ORAL 2 TIMES DAILY
Qty: 14 CAPSULE | Refills: 0 | Status: SHIPPED | OUTPATIENT
Start: 2024-01-10 | End: 2024-01-17

## 2024-01-10 RX ORDER — ONDANSETRON 4 MG/1
4 TABLET, ORALLY DISINTEGRATING ORAL 3 TIMES DAILY PRN
Qty: 15 TABLET | Refills: 0 | Status: SHIPPED | OUTPATIENT
Start: 2024-01-10 | End: 2024-01-15

## 2024-01-10 RX ORDER — PROCHLORPERAZINE EDISYLATE 5 MG/ML
10 INJECTION INTRAMUSCULAR; INTRAVENOUS ONCE
Status: COMPLETED | OUTPATIENT
Start: 2024-01-10 | End: 2024-01-10

## 2024-01-10 RX ADMIN — WATER 1000 MG: 1 INJECTION INTRAMUSCULAR; INTRAVENOUS; SUBCUTANEOUS at 00:46

## 2024-01-10 RX ADMIN — PROCHLORPERAZINE EDISYLATE 10 MG: 5 INJECTION INTRAMUSCULAR; INTRAVENOUS at 00:53

## 2024-01-10 RX ADMIN — IOPAMIDOL 80 ML: 755 INJECTION, SOLUTION INTRAVENOUS at 00:15

## 2024-01-10 ASSESSMENT — PAIN - FUNCTIONAL ASSESSMENT: PAIN_FUNCTIONAL_ASSESSMENT: NONE - DENIES PAIN

## 2024-01-10 NOTE — ED PROVIDER NOTES
and Reassessment: CBC is ordered to evaluate for any signs of infection or inflammation by obtaining a WBC count, or any signs of acute anemia by interpreting hemoglobin. CMP was ordered to evaluate for any electrolyte imbalances, kidney function, hepatic injury or any elevations in anion gap. Urinalysis ordered to evaluate for a UTI and/or hematuria. Lipase level was ordered to evaluate for possible elevations which suggest pancreatic etiology of symptoms. Lactic acid level obtained to evaluate for signs of organ hypoperfusion or ischemia. A CT abdomen with IV contrast was ordered to evaluate for, but without limitation to, ureterolithiasis, nephrolithiasis, constipation, hollow organ perforation, small bowel obstruction, bowel ischemia, pneumoperitoneum, diverticulitis, cholecystitis, appendicitis, perforation.  Patient initially given 1 L normal saline bolus.  CBC and CMP were markedly benign and within normal limits.  Lactic acid and lipase were normal.  Urinalysis reveals a urinary tract infection she was given a dose of Rocephin in the ED.  She reported persistent nausea and was given a dose of Compazine as well.  CT abdomen pelvis revealed no acute intra-abdominal or pelvic process.  Patient reported relief of her symptoms on reevaluation.  She will be discharged home with prescriptions for Zofran and Omnicef.  She was told to come back to the ED if symptoms return, worsen or change at any time.      Disposition Considerations (Tests not ordered but considered, Shared Decision Making, Pt Expectation of Test or Tx.):     FINAL IMPRESSION      1. Urinary tract infection without hematuria, site unspecified    2. Nausea and vomiting, unspecified vomiting type          DISPOSITION/PLAN     DISPOSITION Decision To Discharge 01/10/2024 01:15:30 AM    PATIENT REFERRED TO:  Reid Faith DO  [facility] [id number] [phone]    Call in 2 days  For follow up      DISCHARGE MEDICATIONS:  Discharge

## 2024-01-10 NOTE — TELEPHONE ENCOUNTER
PC to pts son informing Rx sent to pharmacy.      Son stated he picked up the antibiotic last night, but pt wasn't able to keep down so they called an ambulance and took pt to [place] [facility].    Pt was tested for a UTI and was given antibiotics thru IV and to take at home.

## 2024-01-12 LAB
MICROORGANISM SPEC CULT: ABNORMAL
SPECIMEN DESCRIPTION: ABNORMAL

## 2024-01-12 NOTE — ED NOTES
Reviewed patients after hours culture results.   Urine culture growing ESCHERICHIA COLI, patient discharged on cefdinir.   No need for further intervention at this time.    Monika Martinez PharmD, BCPS 1/12/2024 1:35 PM   [phone]

## 2024-02-02 DIAGNOSIS — N18.30 STAGE 3 CHRONIC KIDNEY DISEASE, UNSPECIFIED WHETHER STAGE 3A OR 3B CKD (HCC): ICD-10-CM

## 2024-02-02 DIAGNOSIS — D45 POLYCYTHEMIA VERA (HCC): ICD-10-CM

## 2024-02-02 LAB
ABSOLUTE IMMATURE GRANULOCYTE: 0.05 K/UL (ref 0–0.58)
ALBUMIN SERPL-MCNC: 4.1 G/DL (ref 3.5–5.2)
ALP BLD-CCNC: 65 U/L (ref 35–104)
ALT SERPL-CCNC: 12 U/L (ref 0–32)
ANION GAP SERPL CALCULATED.3IONS-SCNC: 15 MMOL/L (ref 7–16)
AST SERPL-CCNC: 21 U/L (ref 0–31)
BASOPHILS ABSOLUTE: 0.1 K/UL (ref 0–0.2)
BASOPHILS RELATIVE PERCENT: 1 % (ref 0–2)
BILIRUB SERPL-MCNC: 0.8 MG/DL (ref 0–1.2)
BUN BLDV-MCNC: 18 MG/DL (ref 6–23)
CALCIUM SERPL-MCNC: 9.6 MG/DL (ref 8.6–10.2)
CHLORIDE BLD-SCNC: 104 MMOL/L (ref 98–107)
CO2: 24 MMOL/L (ref 22–29)
CREAT SERPL-MCNC: 1.2 MG/DL (ref 0.5–1)
EOSINOPHILS ABSOLUTE: 0.06 K/UL (ref 0.05–0.5)
GFR SERPL CREATININE-BSD FRML MDRD: 44 ML/MIN/1.73M2
GLUCOSE BLD-MCNC: 193 MG/DL (ref 74–99)
HCT VFR BLD CALC: 45 % (ref 34–48)
HEMOGLOBIN: 14.7 G/DL (ref 11.5–15.5)
IMMATURE GRANULOCYTES: 1 % (ref 0–5)
LYMPHOCYTES ABSOLUTE: 0.91 K/UL (ref 1.5–4)
LYMPHOCYTES RELATIVE PERCENT: 12 % (ref 20–42)
MCH RBC QN AUTO: 39.9 PG (ref 26–35)
MCHC RBC AUTO-ENTMCNC: 32.7 G/DL (ref 32–34.5)
MCV RBC AUTO: 122.3 FL (ref 80–99.9)
MONOCYTES ABSOLUTE: 0.17 K/UL (ref 0.1–0.95)
MONOCYTES RELATIVE PERCENT: 2 % (ref 2–12)
NEUTROPHILS ABSOLUTE: 6.61 K/UL (ref 1.8–7.3)
NEUTROPHILS RELATIVE PERCENT: 84 % (ref 43–80)
PDW BLD-RTO: 16.5 % (ref 11.5–15)
PLATELET # BLD: 204 K/UL (ref 130–450)
PMV BLD AUTO: 11 FL (ref 7–12)
POTASSIUM SERPL-SCNC: 4.2 MMOL/L (ref 3.5–5)
RBC # BLD: 3.68 M/UL (ref 3.5–5.5)
RBC # BLD: ABNORMAL 10*6/UL
SODIUM BLD-SCNC: 143 MMOL/L (ref 132–146)
TOTAL PROTEIN: 6.7 G/DL (ref 6.4–8.3)
WBC # BLD: 7.9 K/UL (ref 4.5–11.5)

## 2024-02-07 ENCOUNTER — OFFICE VISIT (OUTPATIENT)
Dept: PRIMARY CARE CLINIC | Age: 89
End: 2024-02-07

## 2024-02-07 VITALS
DIASTOLIC BLOOD PRESSURE: 60 MMHG | HEIGHT: 64 IN | TEMPERATURE: 97.2 F | HEART RATE: 77 BPM | OXYGEN SATURATION: 98 % | WEIGHT: 142.8 LBS | SYSTOLIC BLOOD PRESSURE: 100 MMHG | BODY MASS INDEX: 24.38 KG/M2

## 2024-02-07 DIAGNOSIS — I10 PRIMARY HYPERTENSION: ICD-10-CM

## 2024-02-07 DIAGNOSIS — N39.0 URINARY TRACT INFECTION WITHOUT HEMATURIA, SITE UNSPECIFIED: Primary | ICD-10-CM

## 2024-02-07 DIAGNOSIS — R42 LIGHTHEADED: ICD-10-CM

## 2024-02-07 LAB
BILIRUBIN, POC: NEGATIVE
BLOOD URINE, POC: NEGATIVE
CLARITY, POC: NORMAL
COLOR, POC: NORMAL
GLUCOSE URINE, POC: NEGATIVE
KETONES, POC: NEGATIVE
LEUKOCYTE EST, POC: NEGATIVE
NITRITE, POC: NEGATIVE
PH, POC: 5.5
PROTEIN, POC: NEGATIVE
SPECIFIC GRAVITY, POC: 1.02
UROBILINOGEN, POC: 0.2

## 2024-02-07 ASSESSMENT — PATIENT HEALTH QUESTIONNAIRE - PHQ9
1. LITTLE INTEREST OR PLEASURE IN DOING THINGS: 0
SUM OF ALL RESPONSES TO PHQ9 QUESTIONS 1 & 2: 0
SUM OF ALL RESPONSES TO PHQ QUESTIONS 1-9: 0
2. FEELING DOWN, DEPRESSED OR HOPELESS: 0
SUM OF ALL RESPONSES TO PHQ QUESTIONS 1-9: 0

## 2024-02-07 NOTE — PROGRESS NOTES
Subjective:  88 y.o. female who presents to the office today with chief complaint:  Chief Complaint   Patient presents with    ED Follow-up     1/9/2024 - 1/10/2024 (4 hours)  [facility] Emergency Department  UTI   Still light headed, fatigued, & experiencing low back pain.      Patient was seen in the emergency department on 1/9/2024.  Note reviewed.  She was found to have UTI.  She was placed on cefdinir 300 mg twice daily x 7 days.  She also reported some nausea and vomiting at the visit today and discharged on Zofran.  Patient states today that sometimes with her UTIs she experiences nausea and vomiting.  UA today in office unremarkable after completing cefdinir.  She states she is still feeling somewhat lightheaded.  BP borderline low today.  She states today's reading is where she normally runs.  She is currently on lisinopril 5 mg daily.    Health Maintenance Due   Topic Date Due    DTaP/Tdap/Td vaccine (1 - Tdap) Never done    Shingles vaccine (1 of 2) Never done    Respiratory Syncytial Virus (RSV) Pregnant or age 60 yrs+ (1 - 1-dose 60+ series) Never done    COVID-19 Vaccine (5 - 2023-24 season) 09/01/2023    Annual Wellness Visit (Medicare Advantage)  Never done     Review of Systems   Constitutional:  Negative for appetite change, chills, diaphoresis, fatigue and fever.   HENT:  Negative for congestion, hearing loss, rhinorrhea and sore throat.    Eyes:  Negative for photophobia and visual disturbance.   Respiratory:  Negative for cough, shortness of breath and wheezing.    Cardiovascular:  Negative for chest pain and palpitations.   Gastrointestinal:  Negative for abdominal pain, constipation, diarrhea and vomiting.   Musculoskeletal:  Positive for gait problem. Negative for arthralgias, back pain, neck pain and neck stiffness.   Skin:  Negative for rash.   Neurological:  Positive for light-headedness.   All other systems reviewed and are negative.    Objective:  Vitals:

## 2024-02-09 ASSESSMENT — ENCOUNTER SYMPTOMS
ABDOMINAL PAIN: 0
WHEEZING: 0
CONSTIPATION: 0
SORE THROAT: 0
DIARRHEA: 0
BACK PAIN: 0
PHOTOPHOBIA: 0
SHORTNESS OF BREATH: 0
COUGH: 0
VOMITING: 0
RHINORRHEA: 0

## 2024-02-12 ENCOUNTER — TELEPHONE (OUTPATIENT)
Dept: PRIMARY CARE CLINIC | Age: 89
End: 2024-02-12

## 2024-02-12 DIAGNOSIS — R42 DIZZINESS: Primary | ICD-10-CM

## 2024-02-12 RX ORDER — MECLIZINE HCL 12.5 MG/1
12.5 TABLET ORAL 3 TIMES DAILY PRN
Qty: 30 TABLET | Refills: 0 | Status: SHIPPED | OUTPATIENT
Start: 2024-02-12

## 2024-02-12 RX ORDER — MECLIZINE HCL 12.5 MG/1
12.5 TABLET ORAL 3 TIMES DAILY PRN
COMMUNITY
End: 2024-02-12 | Stop reason: SDUPTHER

## 2024-02-12 NOTE — TELEPHONE ENCOUNTER
Patient was seen in office on 2/7/24 for a ED follow-up where she was still experiencing dizziness. VALERIE told patient to hold her Lisinopril for the weekend, monitor her BP, & contact the office on Monday.     2/7/24 - 100/60  2/8/24 - 128/62  2/9/24 - 136/71  2/10/24 - 138/72  2/11/24 - 127/72  2/12/24 - 125/71    Patient stated that she is still experiencing the dizziness.    Spoke with VALERIE. He is recommending she go back on the Lisinopril. Meclizine will be sent in for her. If you improvement on Meclizine, a referral to ENT will be placed.    Patient verbalized understanding.

## 2024-02-21 DIAGNOSIS — D45 POLYCYTHEMIA VERA (HCC): ICD-10-CM

## 2024-02-21 RX ORDER — HYDROXYUREA 500 MG/1
500 CAPSULE ORAL 2 TIMES DAILY
Qty: 180 CAPSULE | OUTPATIENT
Start: 2024-02-21

## 2024-02-26 DIAGNOSIS — D45 POLYCYTHEMIA VERA (HCC): ICD-10-CM

## 2024-02-26 RX ORDER — HYDROXYUREA 500 MG/1
500 CAPSULE ORAL 2 TIMES DAILY
Qty: 60 CAPSULE | Refills: 1 | Status: SHIPPED | OUTPATIENT
Start: 2024-02-26 | End: 2024-04-26

## 2024-02-29 DIAGNOSIS — N18.30 STAGE 3 CHRONIC KIDNEY DISEASE, UNSPECIFIED WHETHER STAGE 3A OR 3B CKD (HCC): ICD-10-CM

## 2024-02-29 DIAGNOSIS — D45 POLYCYTHEMIA VERA (HCC): ICD-10-CM

## 2024-02-29 LAB
ALBUMIN SERPL-MCNC: 3.8 G/DL (ref 3.5–5.2)
ALP BLD-CCNC: 57 U/L (ref 35–104)
ALT SERPL-CCNC: 11 U/L (ref 0–32)
ANION GAP SERPL CALCULATED.3IONS-SCNC: 11 MMOL/L (ref 7–16)
AST SERPL-CCNC: 22 U/L (ref 0–31)
BILIRUB SERPL-MCNC: 0.7 MG/DL (ref 0–1.2)
BUN BLDV-MCNC: 18 MG/DL (ref 6–23)
CALCIUM SERPL-MCNC: 9.4 MG/DL (ref 8.6–10.2)
CHLORIDE BLD-SCNC: 103 MMOL/L (ref 98–107)
CO2: 24 MMOL/L (ref 22–29)
CREAT SERPL-MCNC: 1.1 MG/DL (ref 0.5–1)
GFR SERPL CREATININE-BSD FRML MDRD: 47 ML/MIN/1.73M2
GLUCOSE BLD-MCNC: 180 MG/DL (ref 74–99)
POTASSIUM SERPL-SCNC: 4.2 MMOL/L (ref 3.5–5)
SODIUM BLD-SCNC: 138 MMOL/L (ref 132–146)
TOTAL PROTEIN: 6.2 G/DL (ref 6.4–8.3)

## 2024-03-01 LAB
ATYPICAL LYMPHOCYTE ABSOLUTE COUNT: 0.09 K/UL (ref 0–0.46)
ATYPICAL LYMPHOCYTES: 2 % (ref 0–4)
BASOPHILS ABSOLUTE: 0.09 K/UL (ref 0–0.2)
BASOPHILS RELATIVE PERCENT: 2 % (ref 0–2)
EOSINOPHILS ABSOLUTE: 0 K/UL (ref 0.05–0.5)
EOSINOPHILS RELATIVE PERCENT: 0 % (ref 0–6)
HCT VFR BLD CALC: 41.7 % (ref 34–48)
HEMOGLOBIN: 13.9 G/DL (ref 11.5–15.5)
LYMPHOCYTES ABSOLUTE: 0.8 K/UL (ref 1.5–4)
LYMPHOCYTES RELATIVE PERCENT: 16 % (ref 20–42)
MCH RBC QN AUTO: 40.8 PG (ref 26–35)
MCHC RBC AUTO-ENTMCNC: 33.3 G/DL (ref 32–34.5)
MCV RBC AUTO: 122.3 FL (ref 80–99.9)
MONOCYTES ABSOLUTE: 0.04 K/UL (ref 0.1–0.95)
MONOCYTES RELATIVE PERCENT: 1 % (ref 2–12)
NEUTROPHILS ABSOLUTE: 4.08 K/UL (ref 1.8–7.3)
NEUTROPHILS RELATIVE PERCENT: 80 % (ref 43–80)
PDW BLD-RTO: 17.3 % (ref 11.5–15)
PLATELET # BLD: 174 K/UL (ref 130–450)
PMV BLD AUTO: 11.1 FL (ref 7–12)
RBC # BLD: 3.41 M/UL (ref 3.5–5.5)
RBC # BLD: ABNORMAL 10*6/UL
WBC # BLD: 5.1 K/UL (ref 4.5–11.5)

## 2024-03-04 ENCOUNTER — OFFICE VISIT (OUTPATIENT)
Dept: ONCOLOGY | Age: 89
End: 2024-03-04
Payer: MEDICARE

## 2024-03-04 VITALS
SYSTOLIC BLOOD PRESSURE: 127 MMHG | DIASTOLIC BLOOD PRESSURE: 65 MMHG | BODY MASS INDEX: 24.34 KG/M2 | OXYGEN SATURATION: 96 % | WEIGHT: 142.6 LBS | HEART RATE: 84 BPM | HEIGHT: 64 IN | TEMPERATURE: 98.2 F

## 2024-03-04 DIAGNOSIS — D45 POLYCYTHEMIA VERA (HCC): Primary | ICD-10-CM

## 2024-03-04 PROCEDURE — 99212 OFFICE O/P EST SF 10 MIN: CPT

## 2024-03-04 RX ORDER — HYDROXYUREA 500 MG/1
500 CAPSULE ORAL 2 TIMES DAILY
Qty: 60 CAPSULE | Refills: 1 | Status: SHIPPED | OUTPATIENT
Start: 2024-03-04 | End: 2024-05-03

## 2024-03-04 NOTE — PROGRESS NOTES
[facility]  [address] [place] [id number]        Pt Name: Lilibeth Nascimento  YOB: 1935  Date of evaluation: 3/4/2024  Primary Care Physician: Reid Faith DO  Reason for evaluation:   No chief complaint on file.         Subjective: Here for  follow up.    On Hydrea BID. Tolerating Hydrea well.        OBJECTIVE:  VITALS:  height is 1.626 m (5' 4\") and weight is 64.7 kg (142 lb 9.6 oz). Her temperature is 98.2 °F (36.8 °C). Her blood pressure is 127/65 and her pulse is 84. Her oxygen saturation is 96%.   Physical Exam:  Performance Status: 0  Well developed, well nourished female  General: AAO to person, place, time, in no acute distress.  Head and neck: PERRLA, EOMI . Sclera non icteric.  Oropharynx: Clear.  Neck: no JVD,  no adenopathy.  Heart: Regular rate and regular rhythm.  No murmur.  Lungs: Clear to auscultation.   Abdomen: Soft, non-tender;no masses, no organomegaly.  Extremities: No edema,no cyanosis, no clubbing.   Neurologic:Cranial nerves grossly intact. No focal motor or sensory deficits.  Skin:  No rash.         Medications  Prior to Admission medications    Medication Sig Start Date End Date Taking? Authorizing Provider   hydroxyurea (HYDREA) 500 MG chemo capsule Take 1 capsule by mouth 2 times daily 2/26/24 4/26/24  Darnell Taylor MD   meclizine (ANTIVERT) 12.5 MG tablet Take 1 tablet by mouth 3 times daily as needed for Dizziness 2/12/24   Kip Johnson PA-C   escitalopram (LEXAPRO) 20 MG tablet TAKE 1 TABLET EVERY DAY 12/6/23   Reid Faith DO   pantoprazole (PROTONIX) 40 MG tablet TAKE 1 TABLET EVERY DAY 12/6/23   Reid Faith DO   docusate sodium (COLACE) 100 MG capsule Take 1 capsule by mouth 2 times daily as needed for Constipation    Provider, MD Surinder   colestipol (COLESTID) 1 g tablet TAKE 2 TABLETS TWICE DAILY  Patient taking differently: 3 times daily TAKE 3 TABLETS  DAILY 8/25/23   Marcell, Reid S, DO   atorvastatin (LIPITOR) 40 MG

## 2024-04-04 ENCOUNTER — OFFICE VISIT (OUTPATIENT)
Dept: PRIMARY CARE CLINIC | Age: 89
End: 2024-04-04
Payer: MEDICARE

## 2024-04-04 VITALS
WEIGHT: 140 LBS | HEART RATE: 96 BPM | BODY MASS INDEX: 23.9 KG/M2 | DIASTOLIC BLOOD PRESSURE: 72 MMHG | TEMPERATURE: 97.4 F | OXYGEN SATURATION: 98 % | HEIGHT: 64 IN | SYSTOLIC BLOOD PRESSURE: 124 MMHG

## 2024-04-04 DIAGNOSIS — E55.9 VITAMIN D INSUFFICIENCY: ICD-10-CM

## 2024-04-04 DIAGNOSIS — N18.30 STAGE 3 CHRONIC KIDNEY DISEASE, UNSPECIFIED WHETHER STAGE 3A OR 3B CKD (HCC): ICD-10-CM

## 2024-04-04 DIAGNOSIS — I10 PRIMARY HYPERTENSION: ICD-10-CM

## 2024-04-04 DIAGNOSIS — K91.89 POSTCHOLECYSTECTOMY DIARRHEA: ICD-10-CM

## 2024-04-04 DIAGNOSIS — Z00.00 INITIAL MEDICARE ANNUAL WELLNESS VISIT: Primary | ICD-10-CM

## 2024-04-04 DIAGNOSIS — R19.7 POSTCHOLECYSTECTOMY DIARRHEA: ICD-10-CM

## 2024-04-04 DIAGNOSIS — E53.8 VITAMIN B12 DEFICIENCY: ICD-10-CM

## 2024-04-04 DIAGNOSIS — E03.8 SUBCLINICAL HYPOTHYROIDISM: ICD-10-CM

## 2024-04-04 DIAGNOSIS — D45 POLYCYTHEMIA VERA (HCC): ICD-10-CM

## 2024-04-04 LAB
CHOLESTEROL: 110 MG/DL
HDLC SERPL-MCNC: 42 MG/DL
LDL CHOLESTEROL: 38 MG/DL
TRIGL SERPL-MCNC: 149 MG/DL
TSH SERPL DL<=0.05 MIU/L-ACNC: 2.31 UIU/ML (ref 0.27–4.2)
VLDLC SERPL CALC-MCNC: 30 MG/DL

## 2024-04-04 PROCEDURE — G0438 PPPS, INITIAL VISIT: HCPCS | Performed by: FAMILY MEDICINE

## 2024-04-04 PROCEDURE — 1123F ACP DISCUSS/DSCN MKR DOCD: CPT | Performed by: FAMILY MEDICINE

## 2024-04-04 PROCEDURE — 36415 COLL VENOUS BLD VENIPUNCTURE: CPT | Performed by: FAMILY MEDICINE

## 2024-04-04 SDOH — ECONOMIC STABILITY: FOOD INSECURITY: WITHIN THE PAST 12 MONTHS, THE FOOD YOU BOUGHT JUST DIDN'T LAST AND YOU DIDN'T HAVE MONEY TO GET MORE.: NEVER TRUE

## 2024-04-04 SDOH — ECONOMIC STABILITY: FOOD INSECURITY: WITHIN THE PAST 12 MONTHS, YOU WORRIED THAT YOUR FOOD WOULD RUN OUT BEFORE YOU GOT MONEY TO BUY MORE.: NEVER TRUE

## 2024-04-04 SDOH — ECONOMIC STABILITY: INCOME INSECURITY: HOW HARD IS IT FOR YOU TO PAY FOR THE VERY BASICS LIKE FOOD, HOUSING, MEDICAL CARE, AND HEATING?: NOT HARD AT ALL

## 2024-04-04 ASSESSMENT — PATIENT HEALTH QUESTIONNAIRE - PHQ9
2. FEELING DOWN, DEPRESSED OR HOPELESS: NOT AT ALL
SUM OF ALL RESPONSES TO PHQ QUESTIONS 1-9: 0
SUM OF ALL RESPONSES TO PHQ9 QUESTIONS 1 & 2: 0
SUM OF ALL RESPONSES TO PHQ QUESTIONS 1-9: 0
1. LITTLE INTEREST OR PLEASURE IN DOING THINGS: NOT AT ALL

## 2024-04-04 NOTE — PROGRESS NOTES
Medicare Annual Wellness Visit    Lilibeth Nascimento is here for Medicare AWV (Initial Medicare Annual Wellness /Labs Drawn/Discuss handicap placard )    Assessment & Plan   Initial Medicare annual wellness visit  Primary hypertension  -     Lipid Panel  Vitamin B12 deficiency  -     Vitamin B12  Vitamin D insufficiency  Subclinical hypothyroidism  -     TSH  Stage 3 chronic kidney disease, unspecified whether stage 3a or 3b CKD (HCC)  Postcholecystectomy diarrhea  Polycythemia vera (HCC)    Recommendations for Preventive Services Due: see orders and patient instructions/AVS.  Recommended screening schedule for the next 5-10 years is provided to the patient in written form: see Patient Instructions/AVS.     Return in 6 months (on 10/4/2024) for Medicare Annual Wellness Visit in 1 year, Labs, Follow up.     Subjective       Patient's complete Health Risk Assessment and screening values have been reviewed and are found in Flowsheets. The following problems were reviewed today and where indicated follow up appointments were made and/or referrals ordered.    Positive Risk Factor Screenings with Interventions:                Activity, Diet, and Weight:  On average, how many days per week do you engage in moderate to strenuous exercise (like a brisk walk)?: 0 days  On average, how many minutes do you engage in exercise at this level?: 0 min    Do you eat balanced/healthy meals regularly?: Yes    Body mass index is 24.03 kg/m².      Inactivity Interventions:  Advised patient to increase activity as tolerated.  Recommend 150 minutes of exercise weekly.            ADL's:   Patient reports needing help with:  Select all that apply: (!) Transportation  Interventions:  Son Ric provides her with all transportation.                  Objective   Vitals:    04/04/24 1132   BP: 124/72   Position: Sitting   Pulse: 96   Temp: 97.4 °F (36.3 °C)   TempSrc: Temporal   SpO2: 98%   Weight: 63.5 kg (140 lb)   Height: 1.626 m (5' 4\")      Body

## 2024-04-04 NOTE — PATIENT INSTRUCTIONS
doctor about stop-smoking programs and medicines. These can increase your chances of quitting for good. Quitting is one of the most important things you can do to protect your heart. It is never too late to quit. Try to avoid secondhand smoke too.     Stay at a weight that's healthy for you. Talk to your doctor if you need help losing weight.     Try to get 7 to 9 hours of sleep each night.     Limit alcohol to 2 drinks a day for men and 1 drink a day for women. Too much alcohol can cause health problems.     Manage other health problems such as diabetes, high blood pressure, and high cholesterol. If you think you may have a problem with alcohol or drug use, talk to your doctor.   Medicines    Take your medicines exactly as prescribed. Call your doctor if you think you are having a problem with your medicine.     If your doctor recommends aspirin, take the amount directed each day. Make sure you take aspirin and not another kind of pain reliever, such as acetaminophen (Tylenol).   When should you call for help?   Call 911 if you have symptoms of a heart attack. These may include:    Chest pain or pressure, or a strange feeling in the chest.     Sweating.     Shortness of breath.     Pain, pressure, or a strange feeling in the back, neck, jaw, or upper belly or in one or both shoulders or arms.     Lightheadedness or sudden weakness.     A fast or irregular heartbeat.   After you call 911, the  may tell you to chew 1 adult-strength or 2 to 4 low-dose aspirin. Wait for an ambulance. Do not try to drive yourself.  Watch closely for changes in your health, and be sure to contact your doctor if you have any problems.  Where can you learn more?  Go to https://www.[employer].net/patientEd and enter F075 to learn more about \"A Healthy Heart: Care Instructions.\"  Current as of: June 24, 2023               Content Version: 14.0  © 4324-3427 [employer]wise, Incorporated.   Care instructions adapted under license by [facility]

## 2024-04-05 LAB — VITAMIN B-12: 606 PG/ML (ref 211–946)

## 2024-04-30 DIAGNOSIS — D45 POLYCYTHEMIA VERA (HCC): ICD-10-CM

## 2024-04-30 LAB
ALBUMIN: 4 G/DL (ref 3.5–5.2)
ALP BLD-CCNC: 63 U/L (ref 35–104)
ALT SERPL-CCNC: 9 U/L (ref 0–32)
ANION GAP SERPL CALCULATED.3IONS-SCNC: 16 MMOL/L (ref 7–16)
AST SERPL-CCNC: 26 U/L (ref 0–31)
BASOPHILS ABSOLUTE: 0.07 K/UL (ref 0–0.2)
BASOPHILS RELATIVE PERCENT: 1 % (ref 0–2)
BILIRUB SERPL-MCNC: 0.8 MG/DL (ref 0–1.2)
BUN BLDV-MCNC: 20 MG/DL (ref 6–23)
CALCIUM SERPL-MCNC: 9.5 MG/DL (ref 8.6–10.2)
CHLORIDE BLD-SCNC: 99 MMOL/L (ref 98–107)
CO2: 19 MMOL/L (ref 22–29)
CREAT SERPL-MCNC: 1.1 MG/DL (ref 0.5–1)
EOSINOPHILS ABSOLUTE: 0.05 K/UL (ref 0.05–0.5)
EOSINOPHILS RELATIVE PERCENT: 1 % (ref 0–6)
GFR, ESTIMATED: 47 ML/MIN/1.73M2
GLUCOSE BLD-MCNC: 160 MG/DL (ref 74–99)
HCT VFR BLD CALC: 43.3 % (ref 34–48)
HEMOGLOBIN: 14.8 G/DL (ref 11.5–15.5)
IMMATURE GRANULOCYTES %: 1 % (ref 0–5)
IMMATURE GRANULOCYTES ABSOLUTE: 0.04 K/UL (ref 0–0.58)
LYMPHOCYTES ABSOLUTE: 0.73 K/UL (ref 1.5–4)
LYMPHOCYTES RELATIVE PERCENT: 12 % (ref 20–42)
MCH RBC QN AUTO: 43.8 PG (ref 26–35)
MCHC RBC AUTO-ENTMCNC: 34.2 G/DL (ref 32–34.5)
MCV RBC AUTO: 128.1 FL (ref 80–99.9)
MONOCYTES ABSOLUTE: 0.12 K/UL (ref 0.1–0.95)
MONOCYTES RELATIVE PERCENT: 2 % (ref 2–12)
NEUTROPHILS ABSOLUTE: 5.17 K/UL (ref 1.8–7.3)
NEUTROPHILS RELATIVE PERCENT: 84 % (ref 43–80)
PDW BLD-RTO: 16.9 % (ref 11.5–15)
PLATELET # BLD: 184 K/UL (ref 130–450)
PMV BLD AUTO: 10.8 FL (ref 7–12)
POTASSIUM SERPL-SCNC: 4.4 MMOL/L (ref 3.5–5)
RBC # BLD: 3.38 M/UL (ref 3.5–5.5)
RBC # BLD: ABNORMAL 10*6/UL
SODIUM BLD-SCNC: 134 MMOL/L (ref 132–146)
TOTAL PROTEIN: 6.7 G/DL (ref 6.4–8.3)
WBC # BLD: 6.2 K/UL (ref 4.5–11.5)

## 2024-05-06 ENCOUNTER — OFFICE VISIT (OUTPATIENT)
Dept: ONCOLOGY | Age: 89
End: 2024-05-06
Payer: MEDICARE

## 2024-05-06 VITALS
WEIGHT: 142.6 LBS | BODY MASS INDEX: 24.48 KG/M2 | TEMPERATURE: 96.3 F | RESPIRATION RATE: 18 BRPM | HEART RATE: 78 BPM | OXYGEN SATURATION: 98 % | DIASTOLIC BLOOD PRESSURE: 65 MMHG | SYSTOLIC BLOOD PRESSURE: 123 MMHG

## 2024-05-06 DIAGNOSIS — D45 POLYCYTHEMIA VERA (HCC): Primary | ICD-10-CM

## 2024-05-06 PROCEDURE — 99212 OFFICE O/P EST SF 10 MIN: CPT

## 2024-05-06 RX ORDER — HYDROXYUREA 500 MG/1
500 CAPSULE ORAL 2 TIMES DAILY
Qty: 60 CAPSULE | Refills: 1 | Status: SHIPPED | OUTPATIENT
Start: 2024-05-06 | End: 2024-07-05

## 2024-05-06 NOTE — PROGRESS NOTES
polycythemia vera with low EPO level and positive JAK2 mutation.  CBC reviewed and exam unremarkable without any palpable hepatosplenomegaly.  No phlebotomy is needed.  To continue present dose of hydroxyurea    3/4/2024  Patient was lost to follow-up since October.  It was again explained to her that she is on hydroxyurea which is considered a chemotherapy pill and has to be compliant with her appointments and should not have interruptions in her medication.  Her CBC is unremarkable except for elevated MCV in relation to hydroxyurea.  On exam she has no evidence of hepatosplenomegaly.  No phlebotomy is needed today.  She will be maintained on hydroxyurea 500 mg twice daily    5/6/2024  Doing very well and tolerating her hydroxyurea.  She is on 500 mg twice daily and has been more compliant.  She is being followed by ophthalmology and is  undergoing injections in her eye presumably vegf inhibitors  Labs reviewed and exam unremarkable.  She has no evidence of hepatosplenomegaly.  Her hemoglobin today is 14.8 and no phlebotomy is warranted.  She will be maintained on hydroxyurea 500 mg twice daily      Darnell Taylor M.D., F.A.C.P.  Electronically signed 5/6/2024 at 1:55 PM

## 2024-05-14 RX ORDER — ATORVASTATIN CALCIUM 40 MG/1
TABLET, FILM COATED ORAL
Qty: 90 TABLET | Refills: 1 | Status: SHIPPED | OUTPATIENT
Start: 2024-05-14

## 2024-05-14 NOTE — TELEPHONE ENCOUNTER
Requested Prescriptions     Pending Prescriptions Disp Refills    atorvastatin (LIPITOR) 40 MG tablet [Pharmacy Med Name: ATORVASTATIN CALCIUM 40 MG Tablet] 90 tablet 3     Sig: TAKE 1 TABLET EVERY DAY       Next appt is 10/3/2024  Last appt was 4/4/2024

## 2024-05-22 RX ORDER — ALENDRONATE SODIUM 70 MG/1
TABLET ORAL
Qty: 12 TABLET | Refills: 3 | Status: SHIPPED | OUTPATIENT
Start: 2024-05-22

## 2024-06-24 ENCOUNTER — TELEPHONE (OUTPATIENT)
Dept: PRIMARY CARE CLINIC | Age: 89
End: 2024-06-24

## 2024-06-24 RX ORDER — MONTELUKAST SODIUM 4 MG/1
1 TABLET, CHEWABLE ORAL 3 TIMES DAILY
Qty: 270 TABLET | Refills: 3 | Status: SHIPPED | OUTPATIENT
Start: 2024-06-24

## 2024-06-24 NOTE — TELEPHONE ENCOUNTER
Refill request      Colestipol 1 g #270  Si tid     States she was taking it 2 bid, but after last visit with Dr. Faith, it was reduced to 1 tid    Patient also mentioned she had contacted [facility] about 6 months ago and asked them to stop sending her so many since she was only taking it 1 tid bc she states she had 16 small bottles and they are expensive.  Since she asked them to stop sending, they told her she will need a new Rx with corrected instructions and quantity       [facility]

## 2024-07-01 RX ORDER — MONTELUKAST SODIUM 4 MG/1
TABLET, CHEWABLE ORAL
Qty: 270 TABLET | Refills: 3 | Status: SHIPPED
Start: 2024-07-01 | End: 2024-07-03 | Stop reason: SDUPTHER

## 2024-07-03 RX ORDER — MONTELUKAST SODIUM 4 MG/1
TABLET, CHEWABLE ORAL
Qty: 270 TABLET | Refills: 3 | Status: SHIPPED | OUTPATIENT
Start: 2024-07-03

## 2024-07-09 DIAGNOSIS — D45 POLYCYTHEMIA VERA (HCC): ICD-10-CM

## 2024-07-09 LAB
ALBUMIN: 3.9 G/DL (ref 3.5–5.2)
ALP BLD-CCNC: 51 U/L (ref 35–104)
ALT SERPL-CCNC: 12 U/L (ref 0–32)
ANION GAP SERPL CALCULATED.3IONS-SCNC: 15 MMOL/L (ref 7–16)
AST SERPL-CCNC: 27 U/L (ref 0–31)
BASOPHILS ABSOLUTE: 0.06 K/UL (ref 0–0.2)
BASOPHILS RELATIVE PERCENT: 1 % (ref 0–2)
BILIRUB SERPL-MCNC: 0.7 MG/DL (ref 0–1.2)
BUN BLDV-MCNC: 21 MG/DL (ref 6–23)
CALCIUM SERPL-MCNC: 9.2 MG/DL (ref 8.6–10.2)
CHLORIDE BLD-SCNC: 103 MMOL/L (ref 98–107)
CO2: 21 MMOL/L (ref 22–29)
CREAT SERPL-MCNC: 1.2 MG/DL (ref 0.5–1)
EOSINOPHILS ABSOLUTE: 0 K/UL (ref 0.05–0.5)
EOSINOPHILS RELATIVE PERCENT: 0 % (ref 0–6)
GFR, ESTIMATED: 42 ML/MIN/1.73M2
GLUCOSE BLD-MCNC: 183 MG/DL (ref 74–99)
HCT VFR BLD CALC: 41.7 % (ref 34–48)
HEMOGLOBIN: 14.2 G/DL (ref 11.5–15.5)
LYMPHOCYTES ABSOLUTE: 0.98 K/UL (ref 1.5–4)
LYMPHOCYTES RELATIVE PERCENT: 14 % (ref 20–42)
MCH RBC QN AUTO: 46.1 PG (ref 26–35)
MCHC RBC AUTO-ENTMCNC: 34.1 G/DL (ref 32–34.5)
MCV RBC AUTO: 135.4 FL (ref 80–99.9)
MONOCYTES ABSOLUTE: 0.06 K/UL (ref 0.1–0.95)
MONOCYTES RELATIVE PERCENT: 1 % (ref 2–12)
NEUTROPHILS ABSOLUTE: 6 K/UL (ref 1.8–7.3)
NEUTROPHILS RELATIVE PERCENT: 85 % (ref 43–80)
PDW BLD-RTO: 17.2 % (ref 11.5–15)
PLATELET # BLD: 192 K/UL (ref 130–450)
PMV BLD AUTO: 10.3 FL (ref 7–12)
POTASSIUM SERPL-SCNC: 4.3 MMOL/L (ref 3.5–5)
RBC # BLD: 3.08 M/UL (ref 3.5–5.5)
RBC # BLD: ABNORMAL 10*6/UL
SODIUM BLD-SCNC: 139 MMOL/L (ref 132–146)
TOTAL PROTEIN: 6.2 G/DL (ref 6.4–8.3)
WBC # BLD: 7.1 K/UL (ref 4.5–11.5)

## 2024-07-10 ENCOUNTER — OFFICE VISIT (OUTPATIENT)
Dept: ONCOLOGY | Age: 89
End: 2024-07-10
Payer: MEDICARE

## 2024-07-10 VITALS
HEART RATE: 82 BPM | DIASTOLIC BLOOD PRESSURE: 78 MMHG | HEIGHT: 64 IN | OXYGEN SATURATION: 95 % | TEMPERATURE: 97.9 F | SYSTOLIC BLOOD PRESSURE: 129 MMHG | WEIGHT: 142.8 LBS | BODY MASS INDEX: 24.38 KG/M2

## 2024-07-10 DIAGNOSIS — D45 POLYCYTHEMIA VERA (HCC): ICD-10-CM

## 2024-07-10 PROCEDURE — 99212 OFFICE O/P EST SF 10 MIN: CPT

## 2024-07-10 RX ORDER — HYDROXYUREA 500 MG/1
500 CAPSULE ORAL 2 TIMES DAILY
Qty: 60 CAPSULE | Refills: 1 | Status: SHIPPED | OUTPATIENT
Start: 2024-07-10 | End: 2024-09-08

## 2024-07-10 NOTE — PROGRESS NOTES
[facility]  [address] [place] [id number]        Pt Name: Lilibeth Nascimento  YOB: 1935  Date of evaluation: 7/10/2024  Primary Care Physician: Reid Faith DO  Reason for evaluation:   Chief Complaint   Patient presents with    Follow-up     Polycythemia vera          Subjective: Here for  follow up.    On Hydrea BID. Tolerating Hydrea well.        OBJECTIVE:  VITALS:  height is 1.626 m (5' 4\") and weight is 64.8 kg (142 lb 12.8 oz). Her temperature is 97.9 °F (36.6 °C). Her blood pressure is 129/78 and her pulse is 82. Her oxygen saturation is 95%.   Physical Exam:  Performance Status: 0  Well developed, well nourished female  General: AAO to person, place, time, in no acute distress.  Head and neck: PERRLA, EOMI . Sclera non icteric.  Oropharynx: Clear.  Neck: no JVD,  no adenopathy.  Heart: Regular rate and regular rhythm.  No murmur.  Lungs: Clear to auscultation.   Abdomen: Soft, non-tender;no masses, no organomegaly.  Extremities: No edema,no cyanosis, no clubbing.   Neurologic:Cranial nerves grossly intact. No focal motor or sensory deficits.  Skin:  No rash.         Medications  Prior to Admission medications    Medication Sig Start Date End Date Taking? Authorizing Provider   colestipol (COLESTID) 1 g tablet TAKE 3 TABLETS  DAILY 7/3/24  Yes Reid Faith DO   alendronate (FOSAMAX) 70 MG tablet TAKE 1 TABLET BY MOUTH 1 TIME A WEEK 5/22/24  Yes Kip Johnson PA-C   atorvastatin (LIPITOR) 40 MG tablet TAKE 1 TABLET EVERY DAY 5/14/24  Yes Reid Faith DO   meclizine (ANTIVERT) 12.5 MG tablet Take 1 tablet by mouth 3 times daily as needed for Dizziness 2/12/24  Yes Kip Johnson PA-C   escitalopram (LEXAPRO) 20 MG tablet TAKE 1 TABLET EVERY DAY 12/6/23  Yes Reid Faith DO   pantoprazole (PROTONIX) 40 MG tablet TAKE 1 TABLET EVERY DAY 12/6/23  Yes Marcell, Reid S, DO   docusate sodium (COLACE) 100 MG capsule Take 1 capsule by mouth 2 times daily as needed for

## 2024-09-24 DIAGNOSIS — D45 POLYCYTHEMIA VERA (HCC): ICD-10-CM

## 2024-09-24 RX ORDER — HYDROXYUREA 500 MG/1
500 CAPSULE ORAL 2 TIMES DAILY
Qty: 60 CAPSULE | Refills: 1 | Status: SHIPPED | OUTPATIENT
Start: 2024-09-24 | End: 2024-09-30 | Stop reason: SDUPTHER

## 2024-09-27 ENCOUNTER — TELEPHONE (OUTPATIENT)
Dept: PRIMARY CARE CLINIC | Age: 89
End: 2024-09-27

## 2024-09-27 DIAGNOSIS — D45 POLYCYTHEMIA VERA (HCC): ICD-10-CM

## 2024-09-27 LAB
ALBUMIN: 3.7 G/DL (ref 3.5–5.2)
ALP BLD-CCNC: 72 U/L (ref 35–104)
ALT SERPL-CCNC: 12 U/L (ref 0–32)
ANION GAP SERPL CALCULATED.3IONS-SCNC: 15 MMOL/L (ref 7–16)
AST SERPL-CCNC: 24 U/L (ref 0–31)
BASOPHILS ABSOLUTE: 0 K/UL (ref 0–0.2)
BASOPHILS RELATIVE PERCENT: 0 % (ref 0–2)
BILIRUB SERPL-MCNC: 0.5 MG/DL (ref 0–1.2)
BUN BLDV-MCNC: 19 MG/DL (ref 6–23)
CALCIUM SERPL-MCNC: 9.4 MG/DL (ref 8.6–10.2)
CHLORIDE BLD-SCNC: 103 MMOL/L (ref 98–107)
CO2: 22 MMOL/L (ref 22–29)
CREAT SERPL-MCNC: 1.2 MG/DL (ref 0.5–1)
EOSINOPHILS ABSOLUTE: 0 K/UL (ref 0.05–0.5)
EOSINOPHILS RELATIVE PERCENT: 0 % (ref 0–6)
GFR, ESTIMATED: 42 ML/MIN/1.73M2
GLUCOSE BLD-MCNC: 165 MG/DL (ref 74–99)
HCT VFR BLD CALC: 38.4 % (ref 34–48)
HEMOGLOBIN: 13.1 G/DL (ref 11.5–15.5)
LYMPHOCYTES ABSOLUTE: 0.48 K/UL (ref 1.5–4)
LYMPHOCYTES RELATIVE PERCENT: 5 % (ref 20–42)
MCH RBC QN AUTO: 45.6 PG (ref 26–35)
MCHC RBC AUTO-ENTMCNC: 34.1 G/DL (ref 32–34.5)
MCV RBC AUTO: 133.8 FL (ref 80–99.9)
MONOCYTES ABSOLUTE: 0 K/UL (ref 0.1–0.95)
MONOCYTES RELATIVE PERCENT: 0 % (ref 2–12)
NEUTROPHILS ABSOLUTE: 10.22 K/UL (ref 1.8–7.3)
NEUTROPHILS RELATIVE PERCENT: 96 % (ref 43–80)
PDW BLD-RTO: 15.8 % (ref 11.5–15)
PLATELET # BLD: 231 K/UL (ref 130–450)
PMV BLD AUTO: 10.4 FL (ref 7–12)
POTASSIUM SERPL-SCNC: 4.5 MMOL/L (ref 3.5–5)
RBC # BLD: 2.87 M/UL (ref 3.5–5.5)
RBC # BLD: ABNORMAL 10*6/UL
SODIUM BLD-SCNC: 140 MMOL/L (ref 132–146)
TOTAL PROTEIN: 6.1 G/DL (ref 6.4–8.3)
WBC # BLD: 10.7 K/UL (ref 4.5–11.5)
WBC # BLD: ABNORMAL 10*3/UL

## 2024-09-27 NOTE — TELEPHONE ENCOUNTER
PC from pt, needing a letter to renew handicap placard.    Current handicap placard expires in November    Please advise.

## 2024-09-30 ENCOUNTER — OFFICE VISIT (OUTPATIENT)
Dept: ONCOLOGY | Age: 89
End: 2024-09-30
Payer: MEDICARE

## 2024-09-30 VITALS
HEART RATE: 81 BPM | SYSTOLIC BLOOD PRESSURE: 156 MMHG | BODY MASS INDEX: 24.59 KG/M2 | WEIGHT: 144 LBS | OXYGEN SATURATION: 97 % | TEMPERATURE: 98.9 F | HEIGHT: 64 IN | DIASTOLIC BLOOD PRESSURE: 52 MMHG

## 2024-09-30 DIAGNOSIS — D45 POLYCYTHEMIA VERA (HCC): ICD-10-CM

## 2024-09-30 PROCEDURE — 99212 OFFICE O/P EST SF 10 MIN: CPT

## 2024-09-30 RX ORDER — HYDROXYUREA 500 MG/1
500 CAPSULE ORAL 2 TIMES DAILY
Qty: 60 CAPSULE | Refills: 1 | Status: SHIPPED | OUTPATIENT
Start: 2024-09-30 | End: 2024-11-29

## 2024-09-30 NOTE — TELEPHONE ENCOUNTER
PC to pt, notifying letter for handicap placard was written in April, 2024    Pt stated she never received the letter.      Copy of letter printed and placed at  for pt pickup.      Pt voiced understanding and will  at appt on 10/3/24

## 2024-10-03 ENCOUNTER — OFFICE VISIT (OUTPATIENT)
Dept: PRIMARY CARE CLINIC | Age: 89
End: 2024-10-03
Payer: MEDICARE

## 2024-10-03 VITALS
HEART RATE: 92 BPM | DIASTOLIC BLOOD PRESSURE: 70 MMHG | BODY MASS INDEX: 24.41 KG/M2 | SYSTOLIC BLOOD PRESSURE: 116 MMHG | OXYGEN SATURATION: 96 % | WEIGHT: 143 LBS | HEIGHT: 64 IN | TEMPERATURE: 98.2 F

## 2024-10-03 DIAGNOSIS — I10 PRIMARY HYPERTENSION: Primary | ICD-10-CM

## 2024-10-03 DIAGNOSIS — E03.8 SUBCLINICAL HYPOTHYROIDISM: ICD-10-CM

## 2024-10-03 DIAGNOSIS — E78.5 HYPERLIPIDEMIA, UNSPECIFIED HYPERLIPIDEMIA TYPE: ICD-10-CM

## 2024-10-03 DIAGNOSIS — E53.8 VITAMIN B12 DEFICIENCY: ICD-10-CM

## 2024-10-03 DIAGNOSIS — R73.9 HYPERGLYCEMIA: ICD-10-CM

## 2024-10-03 PROCEDURE — 1036F TOBACCO NON-USER: CPT | Performed by: FAMILY MEDICINE

## 2024-10-03 PROCEDURE — 36415 COLL VENOUS BLD VENIPUNCTURE: CPT | Performed by: FAMILY MEDICINE

## 2024-10-03 PROCEDURE — 1090F PRES/ABSN URINE INCON ASSESS: CPT | Performed by: FAMILY MEDICINE

## 2024-10-03 PROCEDURE — G8482 FLU IMMUNIZE ORDER/ADMIN: HCPCS | Performed by: FAMILY MEDICINE

## 2024-10-03 PROCEDURE — 90653 IIV ADJUVANT VACCINE IM: CPT | Performed by: FAMILY MEDICINE

## 2024-10-03 PROCEDURE — G0008 ADMIN INFLUENZA VIRUS VAC: HCPCS | Performed by: FAMILY MEDICINE

## 2024-10-03 PROCEDURE — 83036 HEMOGLOBIN GLYCOSYLATED A1C: CPT | Performed by: FAMILY MEDICINE

## 2024-10-03 PROCEDURE — 99214 OFFICE O/P EST MOD 30 MIN: CPT | Performed by: FAMILY MEDICINE

## 2024-10-03 PROCEDURE — 1123F ACP DISCUSS/DSCN MKR DOCD: CPT | Performed by: FAMILY MEDICINE

## 2024-10-03 PROCEDURE — G8427 DOCREV CUR MEDS BY ELIG CLIN: HCPCS | Performed by: FAMILY MEDICINE

## 2024-10-03 PROCEDURE — G8420 CALC BMI NORM PARAMETERS: HCPCS | Performed by: FAMILY MEDICINE

## 2024-10-03 RX ORDER — LISINOPRIL 5 MG/1
5 TABLET ORAL DAILY
Qty: 90 TABLET | Refills: 3 | Status: SHIPPED | OUTPATIENT
Start: 2024-10-03

## 2024-10-03 NOTE — PROGRESS NOTES
Lilibeth Nascimento (:  1935) is a 89 y.o. female,Established patient, here for evaluation of the following chief complaint(s):  6 Month Follow-Up (Pt request handicap sticker Pt request flu vac)      Assessment & Plan   ASSESSMENT/PLAN:  1. Primary hypertension  -     Lipid Panel  2. Vitamin B12 deficiency  -     Vitamin B12  3. Subclinical hypothyroidism  -     TSH  4. Hyperlipidemia, unspecified hyperlipidemia type  -     Lipid Panel  5. Hyperglycemia  -     POCT glycosylated hemoglobin (Hb A1C)      PLAN:  Fasting Labs ordered.  Reissued handicap parking placard letter.  Discussed Covid booster  Trivalent flu vax administered.  In office hemoglobin A1c was 5.4.    Return in about 6 months (around 4/3/2025) for Labs, AWV.         Subjective   SUBJECTIVE/OBJECTIVE:  Patient here for routine 6-month follow-up and labs.   She offers no specific complaints at the present time.  She declines flu vaccine.  She is asking for an updated handicap parking placard letter.  She was issued 1 in April but was informed by the DMV that it was too soon.        Review of Systems   Constitutional:  Negative for chills, fatigue and fever.   HENT:  Negative for congestion, ear discharge, ear pain, facial swelling, hearing loss, nosebleeds, rhinorrhea, sinus pressure and sore throat.    Eyes:  Negative for photophobia, pain, discharge, itching and visual disturbance.   Respiratory:  Negative for cough, shortness of breath and wheezing.    Cardiovascular:  Negative for chest pain, palpitations and leg swelling.   Gastrointestinal:  Negative for abdominal distention, abdominal pain, blood in stool, constipation, diarrhea, nausea and vomiting.   Endocrine: Negative for polydipsia, polyphagia and polyuria.   Genitourinary:  Negative for difficulty urinating, dysuria, frequency, hematuria and urgency.   Musculoskeletal:  Positive for gait problem. Negative for arthralgias, joint swelling and myalgias.   Skin:  Negative for color change

## 2024-10-03 NOTE — PATIENT INSTRUCTIONS
exactly match these viruses, it may still provide some protection.     Influenza vaccine does not cause flu.    Influenza vaccine may be given at the same time as other vaccines.    3. Talk with your health care provider    Tell your vaccination provider if the person getting the vaccine:  o Has had an allergic reaction after a previous dose of influenza vaccine, or has any severe, life-threatening allergies   o Has ever had Guillain-Barré Syndrome (also called \"GBS\")    In some cases, your health care provider may decide to postpone influenza vaccination until a future visit.    Influenza vaccine can be administered at any time during pregnancy. People who are or will be pregnant during influenza season should receive inactivated influenza vaccine.    People with minor illnesses, such as a cold, may be vaccinated. People who are moderately or severely ill should usually wait until they recover before getting influenza vaccine.    Your health care provider can give you more information.    4. Risks of a vaccine reaction    o Soreness, redness, and swelling where the shot is given, fever, muscle aches, and headache can happen after influenza vaccination.  o There may be a very small increased risk of Guillain-Barré Syndrome (GBS) after inactivated influenza vaccine (the flu shot).    Young children who get the flu shot along with pneumococcal vaccine (PCV13) and/or DTaP vaccine at the same time might be slightly more likely to have a seizure caused by fever. Tell your health care provider if a child who is getting flu vaccine has ever had a seizure.    People sometimes faint after medical procedures, including vaccination. Tell your provider if you feel dizzy or have vision changes or ringing in the ears.    As with any medicine, there is a very remote chance of a vaccine causing a severe allergic reaction, other serious injury, or death.    5. What if there is a serious problem?    An allergic reaction could occur

## 2024-10-04 LAB
CHOLESTEROL, TOTAL: 151 MG/DL
HDLC SERPL-MCNC: 45 MG/DL
LDL CHOLESTEROL: 73 MG/DL
TRIGL SERPL-MCNC: 166 MG/DL
TSH SERPL DL<=0.05 MIU/L-ACNC: 3.28 UIU/ML (ref 0.27–4.2)
VITAMIN B-12: 1127 PG/ML (ref 211–946)
VLDLC SERPL CALC-MCNC: 33 MG/DL

## 2024-10-07 ENCOUNTER — NURSE ONLY (OUTPATIENT)
Dept: PRIMARY CARE CLINIC | Age: 89
End: 2024-10-07
Payer: MEDICARE

## 2024-10-07 ENCOUNTER — TELEPHONE (OUTPATIENT)
Dept: PRIMARY CARE CLINIC | Age: 89
End: 2024-10-07

## 2024-10-07 DIAGNOSIS — N39.0 URINARY TRACT INFECTION WITHOUT HEMATURIA, SITE UNSPECIFIED: Primary | ICD-10-CM

## 2024-10-07 DIAGNOSIS — N30.90 CYSTITIS: Primary | ICD-10-CM

## 2024-10-07 LAB
BILIRUBIN, POC: NORMAL
BLOOD URINE, POC: NORMAL
CLARITY, POC: CLEAR
COLOR, POC: NORMAL
GLUCOSE URINE, POC: NORMAL MG/DL
KETONES, POC: NORMAL MG/DL
LEUKOCYTE EST, POC: NORMAL
NITRITE, POC: POSITIVE
PH, POC: 5.5
PROTEIN, POC: NORMAL MG/DL
SPECIFIC GRAVITY, POC: 1.02
UROBILINOGEN, POC: 0.2 MG/DL

## 2024-10-07 PROCEDURE — 81002 URINALYSIS NONAUTO W/O SCOPE: CPT | Performed by: FAMILY MEDICINE

## 2024-10-07 RX ORDER — CEPHALEXIN 500 MG/1
500 CAPSULE ORAL 2 TIMES DAILY
Qty: 14 CAPSULE | Refills: 0 | Status: SHIPPED | OUTPATIENT
Start: 2024-10-07 | End: 2024-10-14

## 2024-10-07 NOTE — TELEPHONE ENCOUNTER
----- Message from Dr. Reid Faith, DO sent at 10/7/2024  6:44 AM EDT -----  Please contact patient to drop off a sterile urine specimen for UA/culture.  Obtain an accurate history as well.

## 2024-10-07 NOTE — TELEPHONE ENCOUNTER
Contacts none daily.  Looks like patient may have a UTI.  A prescription antibiotic was sent to the pharmacy.  A culture was sent out.

## 2024-10-07 NOTE — TELEPHONE ENCOUNTER
? UTI, call from Alfred GONZALES states patient is nauseated, wobbly and weak, sx started 10/4/24  states home UTI test was positive, ok to drop off sample per Dr. Faith  [facility] will prob send out for a culture, will bring in office sterile containter  Keisha ([facility] [address]      Please call Ric mercedes results [phone]   Asking if he can have a call today even if results are negative

## 2024-10-07 NOTE — TELEPHONE ENCOUNTER
Pc to pt states she is having dizziness and some nausea denies any urinary symptoms. Pt is going to have son drop off a specimen. We will order a UA culture as well

## 2024-10-10 LAB
CULTURE: ABNORMAL
CULTURE: ABNORMAL
SPECIMEN DESCRIPTION: ABNORMAL

## 2024-10-25 RX ORDER — LISINOPRIL 10 MG/1
10 TABLET ORAL DAILY
Qty: 90 TABLET | Refills: 3 | OUTPATIENT
Start: 2024-10-25

## 2024-11-05 ENCOUNTER — TELEPHONE (OUTPATIENT)
Dept: PRIMARY CARE CLINIC | Age: 89
End: 2024-11-05

## 2024-11-05 DIAGNOSIS — Z12.31 SCREENING MAMMOGRAM, ENCOUNTER FOR: Primary | ICD-10-CM

## 2024-11-05 DIAGNOSIS — Z12.31 ENCOUNTER FOR SCREENING MAMMOGRAM FOR MALIGNANT NEOPLASM OF BREAST: Primary | ICD-10-CM

## 2024-11-05 NOTE — TELEPHONE ENCOUNTER
PC to pt, informing order for mammogram has been placed.    Pt stated she will call tomorrow (11/6/24) to schedule mammogram appt.

## 2024-11-05 NOTE — TELEPHONE ENCOUNTER
PC from pt, needing a Mammogram Order.    Last mammogram done 11/6/23    [place] [facility] [place] advise.

## 2024-11-08 RX ORDER — ATORVASTATIN CALCIUM 40 MG/1
40 TABLET, FILM COATED ORAL DAILY
Qty: 90 TABLET | Refills: 1 | Status: SHIPPED | OUTPATIENT
Start: 2024-11-08

## 2024-11-08 RX ORDER — ATORVASTATIN CALCIUM 40 MG/1
TABLET, FILM COATED ORAL
Qty: 90 TABLET | Refills: 0 | OUTPATIENT
Start: 2024-11-08

## 2024-11-08 RX ORDER — ESCITALOPRAM OXALATE 20 MG/1
TABLET ORAL
Qty: 90 TABLET | Refills: 0 | OUTPATIENT
Start: 2024-11-08

## 2024-11-08 RX ORDER — OXYBUTYNIN CHLORIDE 10 MG/1
10 TABLET, EXTENDED RELEASE ORAL DAILY
Qty: 30 TABLET | Refills: 5 | Status: SHIPPED | OUTPATIENT
Start: 2024-11-08

## 2024-11-08 RX ORDER — ESCITALOPRAM OXALATE 20 MG/1
20 TABLET ORAL DAILY
Qty: 90 TABLET | Refills: 3 | Status: SHIPPED | OUTPATIENT
Start: 2024-11-08

## 2024-11-08 RX ORDER — OXYBUTYNIN CHLORIDE 10 MG/1
TABLET, EXTENDED RELEASE ORAL
Qty: 90 TABLET | Refills: 0 | OUTPATIENT
Start: 2024-11-08

## 2024-11-08 NOTE — TELEPHONE ENCOUNTER
Requested Prescriptions     Pending Prescriptions Disp Refills    atorvastatin (LIPITOR) 40 MG tablet [Pharmacy Med Name: ATORVASTATIN 40MG TAB] 90 tablet 0    escitalopram (LEXAPRO) 20 MG tablet [Pharmacy Med Name: ESCITALOPRAM 20MG TAB] 90 tablet 0    lisinopril (PRINIVIL;ZESTRIL) 5 MG tablet [Pharmacy Med Name: LISINOPRIL  5MG TAB] 90 tablet 0    oxyBUTYnin (DITROPAN-XL) 10 MG extended release tablet [Pharmacy Med Name: OXYBUTYNIN ER 10MG TAB] 90 tablet 0    pantoprazole (PROTONIX) 40 MG tablet [Pharmacy Med Name: PANTOPRAZOLE DR 40MG TAB] 90 tablet 0    nitrofurantoin (MACRODANTIN) 50 MG capsule [Pharmacy Med Name: NITROFURANTOIN MACRO  50MG CAP] 45 capsule 0    colestipol (COLESTID) 1 g tablet [Pharmacy Med Name: COLESTIPOL 1GM TAB] 270 tablet 0    alendronate (FOSAMAX) 70 MG tablet [Pharmacy Med Name: ALENDRONATE 70MG TAB] 13 tablet 0     Last Appointment:  10/3/2024  Future Appointments   Date Time Provider Department Center   11/26/2024  1:00 PM [facility] HOLOGIC [facility]   12/2/2024  1:15 PM Darnell Taylor MD Blood Cancer [facility]   4/3/2025  1:30 PM Reid Faith DO [facility] ECC DEP    '

## 2024-11-08 NOTE — TELEPHONE ENCOUNTER
Please check current medication list.  Some of these medications have already been refilled and she has refills available.  I refilled the ones that I thought were appropriate.

## 2024-11-10 RX ORDER — ALENDRONATE SODIUM 70 MG/1
TABLET ORAL
Qty: 13 TABLET | Refills: 0 | OUTPATIENT
Start: 2024-11-10

## 2024-11-10 RX ORDER — COLESTIPOL HYDROCHLORIDE 1 G/1
TABLET ORAL
Qty: 270 TABLET | Refills: 0 | OUTPATIENT
Start: 2024-11-10

## 2024-11-10 RX ORDER — PANTOPRAZOLE SODIUM 40 MG/1
TABLET, DELAYED RELEASE ORAL
Qty: 90 TABLET | Refills: 0 | OUTPATIENT
Start: 2024-11-10

## 2024-11-10 RX ORDER — LISINOPRIL 5 MG/1
TABLET ORAL
Qty: 90 TABLET | Refills: 0 | OUTPATIENT
Start: 2024-11-10

## 2024-11-10 RX ORDER — NITROFURANTOIN MACROCRYSTALS 50 MG/1
CAPSULE ORAL
Qty: 45 CAPSULE | Refills: 0 | OUTPATIENT
Start: 2024-11-10

## 2024-11-11 RX ORDER — ESCITALOPRAM OXALATE 20 MG/1
20 TABLET ORAL DAILY
Qty: 90 TABLET | Refills: 3 | Status: SHIPPED | OUTPATIENT
Start: 2024-11-11

## 2024-11-11 RX ORDER — ATORVASTATIN CALCIUM 40 MG/1
40 TABLET, FILM COATED ORAL DAILY
Qty: 90 TABLET | Refills: 1 | Status: SHIPPED | OUTPATIENT
Start: 2024-11-11

## 2024-11-11 RX ORDER — LISINOPRIL 5 MG/1
5 TABLET ORAL DAILY
Qty: 90 TABLET | Refills: 3 | Status: SHIPPED | OUTPATIENT
Start: 2024-11-11

## 2024-11-11 RX ORDER — OXYBUTYNIN CHLORIDE 10 MG/1
10 TABLET, EXTENDED RELEASE ORAL DAILY
Qty: 30 TABLET | Refills: 5 | Status: SHIPPED | OUTPATIENT
Start: 2024-11-11

## 2024-11-11 NOTE — TELEPHONE ENCOUNTER
PC from pts son, asking if the requested medications can be sent to [facility] mail order pharmacy instead of [facility]    Please advise

## 2024-11-11 NOTE — TELEPHONE ENCOUNTER
Name of Medication(s) Requested:  Requested Prescriptions     Pending Prescriptions Disp Refills    atorvastatin (LIPITOR) 40 MG tablet 90 tablet 1     Sig: Take 1 tablet by mouth daily    escitalopram (LEXAPRO) 20 MG tablet 90 tablet 3     Sig: Take 1 tablet by mouth daily    lisinopril (PRINIVIL;ZESTRIL) 5 MG tablet 90 tablet 3     Sig: Take 1 tablet by mouth daily    oxyBUTYnin (DITROPAN-XL) 10 MG extended release tablet 30 tablet 5     Sig: Take 1 tablet by mouth daily       Medication is on current medication list Yes    Dosage and directions were verified? Yes    Quantity verified: 90 day supply     Pharmacy Verified?  Yes    Last Appointment:  10/3/2024    Future appts:  Future Appointments   Date Time Provider Department Center   11/26/2024  1:00 PM [facility] MAMMO HOLOGIC [facility] MAMMO [facility]   12/2/2024  1:15 PM Darnell Taylor MD Blood Cancer [facility]   4/3/2025  1:30 PM Reid Faith DO [facility] ECC DEP        (If no appt send self scheduling link. .REFILLAPPT)  Scheduling request sent?     [] Yes  [x] No    Does patient need updated?  [] Yes  [x] No

## 2024-11-26 ENCOUNTER — HOSPITAL ENCOUNTER (OUTPATIENT)
Dept: MAMMOGRAPHY | Age: 88
Discharge: HOME | End: 2024-11-28
Attending: FAMILY MEDICINE
Payer: MEDICARE

## 2024-11-26 ENCOUNTER — HOSPITAL ENCOUNTER (OUTPATIENT)
Age: 88
Discharge: HOME | End: 2024-11-26
Attending: FAMILY MEDICINE
Payer: MEDICARE

## 2024-11-26 DIAGNOSIS — Z12.31 SCREENING MAMMOGRAM, ENCOUNTER FOR: ICD-10-CM

## 2024-11-26 DIAGNOSIS — D45 POLYCYTHEMIA VERA (HCC): ICD-10-CM

## 2024-11-26 LAB
ALBUMIN SERPL-MCNC: 4 G/DL (ref 3.5–5.2)
ALP SERPL-CCNC: 53 U/L (ref 35–104)
ALT SERPL-CCNC: 13 U/L (ref 0–32)
ANION GAP SERPL CALCULATED.3IONS-SCNC: 10 MMOL/L (ref 7–16)
AST SERPL-CCNC: 20 U/L (ref 0–31)
BASOPHILS # BLD: 0.11 K/UL (ref 0–0.2)
BASOPHILS NFR BLD: 2 % (ref 0–2)
BILIRUB SERPL-MCNC: 0.6 MG/DL (ref 0–1.2)
BUN SERPL-MCNC: 28 MG/DL (ref 6–23)
CALCIUM SERPL-MCNC: 9.6 MG/DL (ref 8.6–10.2)
CHLORIDE SERPL-SCNC: 104 MMOL/L (ref 98–107)
CO2 SERPL-SCNC: 25 MMOL/L (ref 22–29)
CREAT SERPL-MCNC: 1.4 MG/DL (ref 0.5–1)
EOSINOPHIL # BLD: 0 K/UL (ref 0.05–0.5)
EOSINOPHILS RELATIVE PERCENT: 0 % (ref 0–6)
ERYTHROCYTE [DISTWIDTH] IN BLOOD BY AUTOMATED COUNT: 15.5 % (ref 11.5–15)
GFR, ESTIMATED: 37 ML/MIN/1.73M2
GLUCOSE SERPL-MCNC: 140 MG/DL (ref 74–99)
HCT VFR BLD AUTO: 42.6 % (ref 34–48)
HGB BLD-MCNC: 15.6 G/DL (ref 11.5–15.5)
LYMPHOCYTES NFR BLD: 0.61 K/UL (ref 1.5–4)
LYMPHOCYTES RELATIVE PERCENT: 10 % (ref 20–42)
MCH RBC QN AUTO: 45.5 PG (ref 26–35)
MCHC RBC AUTO-ENTMCNC: 36.6 G/DL (ref 32–34.5)
MCV RBC AUTO: 124.2 FL (ref 80–99.9)
MONOCYTES NFR BLD: 0.17 K/UL (ref 0.1–0.95)
MONOCYTES NFR BLD: 3 % (ref 2–12)
NEUTROPHILS NFR BLD: 86 % (ref 43–80)
NEUTS SEG NFR BLD: 5.51 K/UL (ref 1.8–7.3)
PLATELET # BLD AUTO: 221 K/UL (ref 130–450)
PMV BLD AUTO: 10 FL (ref 7–12)
POTASSIUM SERPL-SCNC: 4.7 MMOL/L (ref 3.5–5)
PROT SERPL-MCNC: 6.4 G/DL (ref 6.4–8.3)
RBC # BLD AUTO: 3.43 M/UL (ref 3.5–5.5)
RBC # BLD: ABNORMAL 10*6/UL
SODIUM SERPL-SCNC: 139 MMOL/L (ref 132–146)
WBC OTHER # BLD: 6.4 K/UL (ref 4.5–11.5)

## 2024-11-26 PROCEDURE — 77063 BREAST TOMOSYNTHESIS BI: CPT

## 2024-11-26 PROCEDURE — 80053 COMPREHEN METABOLIC PANEL: CPT

## 2024-11-26 PROCEDURE — 85025 COMPLETE CBC W/AUTO DIFF WBC: CPT

## 2024-11-26 PROCEDURE — 36415 COLL VENOUS BLD VENIPUNCTURE: CPT

## 2024-12-02 ENCOUNTER — OFFICE VISIT (OUTPATIENT)
Dept: ONCOLOGY | Age: 88
End: 2024-12-02

## 2024-12-02 ENCOUNTER — HOSPITAL ENCOUNTER (OUTPATIENT)
Dept: INFUSION THERAPY | Age: 88
Discharge: HOME | End: 2024-12-02
Payer: MEDICARE

## 2024-12-02 VITALS
TEMPERATURE: 98.1 F | BODY MASS INDEX: 24.04 KG/M2 | SYSTOLIC BLOOD PRESSURE: 122 MMHG | OXYGEN SATURATION: 95 % | HEIGHT: 64 IN | WEIGHT: 140.8 LBS | DIASTOLIC BLOOD PRESSURE: 74 MMHG | HEART RATE: 87 BPM

## 2024-12-02 VITALS
TEMPERATURE: 97.8 F | RESPIRATION RATE: 18 BRPM | SYSTOLIC BLOOD PRESSURE: 110 MMHG | HEART RATE: 79 BPM | OXYGEN SATURATION: 96 % | DIASTOLIC BLOOD PRESSURE: 53 MMHG

## 2024-12-02 DIAGNOSIS — D45 POLYCYTHEMIA VERA (HCC): Primary | ICD-10-CM

## 2024-12-02 PROCEDURE — 99195 PHLEBOTOMY: CPT

## 2024-12-02 RX ORDER — 0.9 % SODIUM CHLORIDE 0.9 %
250 INTRAVENOUS SOLUTION INTRAVENOUS ONCE
OUTPATIENT
Start: 2024-12-02 | End: 2024-12-02

## 2024-12-02 RX ORDER — 0.9 % SODIUM CHLORIDE 0.9 %
250 INTRAVENOUS SOLUTION INTRAVENOUS ONCE
Status: CANCELLED | OUTPATIENT
Start: 2024-12-02 | End: 2024-12-02

## 2024-12-02 NOTE — PROGRESS NOTES
hepatosplenomegaly.  Her hemoglobin is up to 15.6.  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  She will undergo therapeutic phlebotomy today and 250 cc of blood will be drawn under medical supervision and she will be maintained on present dose of hydroxyurea.          Darnell Taylor M.D., F.A.C.P.  Electronically signed 12/2/2024 at 1:23 PM

## 2024-12-02 NOTE — PROGRESS NOTES
IV site initiated at L AC with 20# intima. Therapeutic phlebotomy performed for ~200 cc blood. Tolerated procedure and 15 minute observation without adverse reaction. Took fluids freely throughout observation.     Vitals: BP (!) 110/53 Comment: pt asymptomatic  Pulse 79   Temp 97.8 °F (36.6 °C) (Infrared)   Resp 18   SpO2 96%        Verbal information provided to patient / family on procedure and post procedure care. Encouraged to call clinic during clinic hours and physician after clinic hours if questions or concerns arise.

## 2024-12-17 NOTE — TELEPHONE ENCOUNTER
Name of Medication(s) Requested:  Requested Prescriptions     Pending Prescriptions Disp Refills    pantoprazole (PROTONIX) 40 MG tablet [Pharmacy Med Name: Pantoprazole Sodium Oral Tablet Delayed Release 40 MG] 90 tablet 3     Sig: TAKE 1 TABLET EVERY DAY       Medication is on current medication list Yes    Dosage and directions were verified? Yes    Quantity verified: 90 day supply     Pharmacy Verified?  Yes    Last Appointment:  10/3/2024    Future appts:  Future Appointments   Date Time Provider Department Center   1/27/2025  1:15 PM Darnell Taylor MD Blood Cancer [facility]   1/27/2025  1:30 PM [facility] CHAIR 12 MEDICAL ONCOLOGY [facility] MED ONC [facility]   4/3/2025  1:30 PM Reid Faith DO [facility] ECC DEP        (If no appt send self scheduling link. .REFILLAPPT)  Scheduling request sent?     [] Yes  [x] No    Does patient need updated?  [] Yes  [x] No

## 2024-12-18 RX ORDER — PANTOPRAZOLE SODIUM 40 MG/1
40 TABLET, DELAYED RELEASE ORAL DAILY
Qty: 90 TABLET | Refills: 0 | Status: SHIPPED | OUTPATIENT
Start: 2024-12-18

## 2025-01-13 ENCOUNTER — TELEPHONE (OUTPATIENT)
Dept: PRIMARY CARE CLINIC | Age: 89
End: 2025-01-13

## 2025-01-13 RX ORDER — ESCITALOPRAM OXALATE 20 MG/1
20 TABLET ORAL DAILY
Qty: 90 TABLET | Refills: 3 | Status: SHIPPED | OUTPATIENT
Start: 2025-01-13

## 2025-01-13 RX ORDER — LISINOPRIL 5 MG/1
5 TABLET ORAL DAILY
Qty: 90 TABLET | Refills: 3 | Status: SHIPPED | OUTPATIENT
Start: 2025-01-13

## 2025-01-13 RX ORDER — OXYBUTYNIN CHLORIDE 10 MG/1
10 TABLET, EXTENDED RELEASE ORAL DAILY
Qty: 90 TABLET | Refills: 3 | Status: SHIPPED | OUTPATIENT
Start: 2025-01-13

## 2025-01-13 RX ORDER — COLESTIPOL HYDROCHLORIDE 1 G/1
TABLET ORAL
Qty: 270 TABLET | Refills: 3 | Status: SHIPPED | OUTPATIENT
Start: 2025-01-13

## 2025-01-13 RX ORDER — PANTOPRAZOLE SODIUM 40 MG/1
40 TABLET, DELAYED RELEASE ORAL DAILY
Qty: 90 TABLET | Refills: 0 | Status: SHIPPED | OUTPATIENT
Start: 2025-01-13

## 2025-01-13 RX ORDER — ATORVASTATIN CALCIUM 40 MG/1
40 TABLET, FILM COATED ORAL DAILY
Qty: 90 TABLET | Refills: 1 | Status: SHIPPED | OUTPATIENT
Start: 2025-01-13

## 2025-01-13 NOTE — TELEPHONE ENCOUNTER
PC from Ric informing that patient [facility] for [facility]  [id number] (unable to load bc RTE is down)    States that she is no longer able to use [facility]    He did not know what pharmacy, so he is going to contact [place] and let us know where to transfer medication

## 2025-01-13 NOTE — TELEPHONE ENCOUNTER
Pt has a new insurance co and has to switch her mail order pharmacy, [facility].    Pt will need her medications sent to [facility].    Please advise.

## 2025-01-13 NOTE — TELEPHONE ENCOUNTER
Name of Medication(s) Requested:  Requested Prescriptions     Pending Prescriptions Disp Refills    atorvastatin (LIPITOR) 40 MG tablet 90 tablet 1     Sig: Take 1 tablet by mouth daily    colestipol (COLESTID) 1 g tablet 270 tablet 3     Sig: TAKE 3 TABLETS  DAILY    escitalopram (LEXAPRO) 20 MG tablet 90 tablet 3     Sig: Take 1 tablet by mouth daily    lisinopril (PRINIVIL;ZESTRIL) 5 MG tablet 90 tablet 3     Sig: Take 1 tablet by mouth daily    oxyBUTYnin (DITROPAN-XL) 10 MG extended release tablet 30 tablet 5     Sig: Take 1 tablet by mouth daily    pantoprazole (PROTONIX) 40 MG tablet 90 tablet 0     Sig: Take 1 tablet by mouth daily       Medication is on current medication list Yes    Dosage and directions were verified? Yes    Quantity verified: 90 day supply     Pharmacy Verified?  Yes    Last Appointment:  10/3/2024    Future appts:  Future Appointments   Date Time Provider Department Center   1/27/2025  1:15 PM Darnell Taylor MD Blood Cancer [facility]   1/27/2025  1:30 PM [facility] CHAIR 12 MEDICAL ONCOLOGY [facility] MED ONC [place]   4/3/2025  1:30 PM Reid Faith DO [facility] PC [facility] ECC DEP        (If no appt send self scheduling link. .REFILLAPPT)  Scheduling request sent?     [] Yes  [x] No    Does patient need updated?  [] Yes  [x] No

## 2025-01-14 DIAGNOSIS — D45 POLYCYTHEMIA VERA (HCC): ICD-10-CM

## 2025-01-14 RX ORDER — HYDROXYUREA 500 MG/1
500 CAPSULE ORAL 2 TIMES DAILY
Qty: 60 CAPSULE | Refills: 1 | Status: SHIPPED | OUTPATIENT
Start: 2025-01-14 | End: 2025-03-15

## 2025-01-24 DIAGNOSIS — D45 POLYCYTHEMIA VERA (HCC): ICD-10-CM

## 2025-01-24 LAB
ALBUMIN: 4 G/DL (ref 3.5–5.2)
ALP BLD-CCNC: 61 U/L (ref 35–104)
ALT SERPL-CCNC: 16 U/L (ref 0–32)
ANION GAP SERPL CALCULATED.3IONS-SCNC: 10 MMOL/L (ref 7–16)
AST SERPL-CCNC: 26 U/L (ref 0–31)
BASOPHILS ABSOLUTE: 0 K/UL (ref 0–0.2)
BASOPHILS RELATIVE PERCENT: 0 % (ref 0–2)
BILIRUB SERPL-MCNC: 0.6 MG/DL (ref 0–1.2)
BUN BLDV-MCNC: 23 MG/DL (ref 6–23)
CALCIUM SERPL-MCNC: 9.5 MG/DL (ref 8.6–10.2)
CHLORIDE BLD-SCNC: 103 MMOL/L (ref 98–107)
CO2: 26 MMOL/L (ref 22–29)
CREAT SERPL-MCNC: 1.1 MG/DL (ref 0.5–1)
EOSINOPHILS ABSOLUTE: 0.09 K/UL (ref 0.05–0.5)
EOSINOPHILS RELATIVE PERCENT: 1 % (ref 0–6)
GFR, ESTIMATED: 51 ML/MIN/1.73M2
GLUCOSE BLD-MCNC: 142 MG/DL (ref 74–99)
HCT VFR BLD CALC: 43.4 % (ref 34–48)
HEMOGLOBIN: 14.8 G/DL (ref 11.5–15.5)
LYMPHOCYTES ABSOLUTE: 0.34 K/UL (ref 1.5–4)
LYMPHOCYTES RELATIVE PERCENT: 4 % (ref 20–42)
MCH RBC QN AUTO: 44.8 PG (ref 26–35)
MCHC RBC AUTO-ENTMCNC: 34.1 G/DL (ref 32–34.5)
MCV RBC AUTO: 131.5 FL (ref 80–99.9)
METAMYELOCYTES ABSOLUTE COUNT: 0.09 K/UL (ref 0–0.12)
METAMYELOCYTES: 1 % (ref 0–1)
MONOCYTES ABSOLUTE: 0.17 K/UL (ref 0.1–0.95)
MONOCYTES RELATIVE PERCENT: 2 % (ref 2–12)
NEUTROPHILS ABSOLUTE: 9.11 K/UL (ref 1.8–7.3)
NEUTROPHILS RELATIVE PERCENT: 93 % (ref 43–80)
PDW BLD-RTO: 17 % (ref 11.5–15)
PLATELET # BLD: 255 K/UL (ref 130–450)
PMV BLD AUTO: 10 FL (ref 7–12)
POTASSIUM SERPL-SCNC: 4.9 MMOL/L (ref 3.5–5)
RBC # BLD: 3.3 M/UL (ref 3.5–5.5)
RBC # BLD: ABNORMAL 10*6/UL
SODIUM BLD-SCNC: 139 MMOL/L (ref 132–146)
TOTAL PROTEIN: 6.7 G/DL (ref 6.4–8.3)
WBC # BLD: 9.8 K/UL (ref 4.5–11.5)

## 2025-02-17 ENCOUNTER — OFFICE VISIT (OUTPATIENT)
Dept: ONCOLOGY | Age: 89
End: 2025-02-17
Payer: MEDICARE

## 2025-02-17 ENCOUNTER — HOSPITAL ENCOUNTER (OUTPATIENT)
Dept: INFUSION THERAPY | Age: 89
Discharge: HOME | End: 2025-02-17

## 2025-02-17 VITALS
DIASTOLIC BLOOD PRESSURE: 60 MMHG | HEART RATE: 89 BPM | BODY MASS INDEX: 23.99 KG/M2 | TEMPERATURE: 98 F | OXYGEN SATURATION: 98 % | HEIGHT: 64 IN | WEIGHT: 140.5 LBS | SYSTOLIC BLOOD PRESSURE: 121 MMHG

## 2025-02-17 DIAGNOSIS — D45 POLYCYTHEMIA VERA (HCC): ICD-10-CM

## 2025-02-17 PROCEDURE — 99212 OFFICE O/P EST SF 10 MIN: CPT

## 2025-02-17 RX ORDER — HYDROXYUREA 500 MG/1
500 CAPSULE ORAL 2 TIMES DAILY
Qty: 60 CAPSULE | Refills: 1 | Status: SHIPPED | OUTPATIENT
Start: 2025-02-17 | End: 2025-04-18

## 2025-02-17 NOTE — PROGRESS NOTES
undergo therapeutic phlebotomy today and 250 cc of blood will be withdrawn under medical supervision.    Exam is negative.  No splenomegaly.  Her hydroxyurea dose to be increased to 500 mg twice daily.       11/30/2022  Doing well.  No change in medications.  Her hydroxyurea dose was increased to 500 mg twice daily.  She required phlebotomy back in October.  Her hemoglobin is improved and down to 15.3.  She is to undergo today and additional therapeutic phlebotomy and 250 cc of blood will be drawn under medical supervision.  She will be maintained on Hydrea 500 mg twice daily.      On Hydrea 500 mg BID      1/11/2023  Patient has been compliant with her Hydrea and is taking 500 mg twice daily.  She is tolerating well.  On exam no evidence of splenomegaly.    Hgb was 15.9; Hct was 46.6 on 1/06/2023.    She will undergo therapeutic phlebotomy and 250 cc of blood will be drawn under medical supervision.  To continue Hydrea 500 mg twice daily.      5/03/2023  To follow-up since January.  She has been doing well however.  She remains on hydroxyurea 500 mg twice daily.  Her last phlebotomy was in January.  Patient with a history of polycythemia vera with a low EPO level and positive JAK2 mutation.  CBC today with stable hemoglobin of 13.5 with normal WBC and platelet counts.  Her MCV is elevated in relation to hydroxyurea.  To continue hydroxyurea 500 mg twice daily.  Her exam is unremarkable without any palpable splenomegaly.  No phlebotomy needed at this time    7/12/23  Patient is doing remarkably well she has been tolerating hydroxyurea 500 mg twice a day.  She has not required any phlebotomy since January.  She has a history of polycythemia vera with low EPO level and positive JAK2 mutation.  CBC reviewed and exam unremarkable without any palpable hepatosplenomegaly.  No phlebotomy is needed.  To continue present dose of hydroxyurea    9/13/23  Tolerating Hydrea well.  No complaints.  She has been maintained on 500

## 2025-04-03 ENCOUNTER — OFFICE VISIT (OUTPATIENT)
Dept: PRIMARY CARE CLINIC | Age: 89
End: 2025-04-03
Payer: MEDICARE

## 2025-04-03 VITALS
SYSTOLIC BLOOD PRESSURE: 112 MMHG | DIASTOLIC BLOOD PRESSURE: 80 MMHG | HEIGHT: 64 IN | OXYGEN SATURATION: 98 % | BODY MASS INDEX: 23.56 KG/M2 | HEART RATE: 71 BPM | TEMPERATURE: 97.4 F | WEIGHT: 138 LBS

## 2025-04-03 DIAGNOSIS — N18.30 STAGE 3 CHRONIC KIDNEY DISEASE, UNSPECIFIED WHETHER STAGE 3A OR 3B CKD (HCC): ICD-10-CM

## 2025-04-03 DIAGNOSIS — E53.8 VITAMIN B12 DEFICIENCY: ICD-10-CM

## 2025-04-03 DIAGNOSIS — M47.817 OSTEOARTHRITIS OF LUMBOSACRAL SPINE: ICD-10-CM

## 2025-04-03 DIAGNOSIS — E78.5 HYPERLIPIDEMIA, UNSPECIFIED HYPERLIPIDEMIA TYPE: ICD-10-CM

## 2025-04-03 DIAGNOSIS — I10 PRIMARY HYPERTENSION: ICD-10-CM

## 2025-04-03 DIAGNOSIS — E55.9 VITAMIN D INSUFFICIENCY: ICD-10-CM

## 2025-04-03 DIAGNOSIS — E03.8 SUBCLINICAL HYPOTHYROIDISM: ICD-10-CM

## 2025-04-03 DIAGNOSIS — R73.9 HYPERGLYCEMIA: ICD-10-CM

## 2025-04-03 DIAGNOSIS — Z00.00 MEDICARE ANNUAL WELLNESS VISIT, SUBSEQUENT: Primary | ICD-10-CM

## 2025-04-03 PROBLEM — C44.320 SQUAMOUS CELL CARCINOMA OF SKIN OF FACE: Status: ACTIVE | Noted: 2025-03-27

## 2025-04-03 PROBLEM — C44.621 SQUAMOUS CELL CARCINOMA OF UPPER EXTREMITY: Status: ACTIVE | Noted: 2025-04-02

## 2025-04-03 LAB — HBA1C MFR BLD: 5.5 %

## 2025-04-03 PROCEDURE — G0439 PPPS, SUBSEQ VISIT: HCPCS | Performed by: FAMILY MEDICINE

## 2025-04-03 PROCEDURE — 1123F ACP DISCUSS/DSCN MKR DOCD: CPT | Performed by: FAMILY MEDICINE

## 2025-04-03 PROCEDURE — 1159F MED LIST DOCD IN RCRD: CPT | Performed by: FAMILY MEDICINE

## 2025-04-03 PROCEDURE — 1160F RVW MEDS BY RX/DR IN RCRD: CPT | Performed by: FAMILY MEDICINE

## 2025-04-03 PROCEDURE — 83036 HEMOGLOBIN GLYCOSYLATED A1C: CPT | Performed by: FAMILY MEDICINE

## 2025-04-03 SDOH — ECONOMIC STABILITY: FOOD INSECURITY: WITHIN THE PAST 12 MONTHS, THE FOOD YOU BOUGHT JUST DIDN'T LAST AND YOU DIDN'T HAVE MONEY TO GET MORE.: NEVER TRUE

## 2025-04-03 SDOH — ECONOMIC STABILITY: FOOD INSECURITY: WITHIN THE PAST 12 MONTHS, YOU WORRIED THAT YOUR FOOD WOULD RUN OUT BEFORE YOU GOT MONEY TO BUY MORE.: NEVER TRUE

## 2025-04-03 ASSESSMENT — PATIENT HEALTH QUESTIONNAIRE - PHQ9
SUM OF ALL RESPONSES TO PHQ QUESTIONS 1-9: 0
SUM OF ALL RESPONSES TO PHQ QUESTIONS 1-9: 0
1. LITTLE INTEREST OR PLEASURE IN DOING THINGS: NOT AT ALL
SUM OF ALL RESPONSES TO PHQ QUESTIONS 1-9: 0
2. FEELING DOWN, DEPRESSED OR HOPELESS: NOT AT ALL
SUM OF ALL RESPONSES TO PHQ QUESTIONS 1-9: 0

## 2025-04-03 ASSESSMENT — LIFESTYLE VARIABLES
HOW MANY STANDARD DRINKS CONTAINING ALCOHOL DO YOU HAVE ON A TYPICAL DAY: PATIENT DOES NOT DRINK
HOW OFTEN DO YOU HAVE A DRINK CONTAINING ALCOHOL: NEVER

## 2025-04-03 NOTE — PROGRESS NOTES
Medicare Annual Wellness Visit    Lilibeth Nascimento is here for Medicare AWV (Subsequent Medicare AWE Pt request lab req. Pt was seen at [facility] for Ca lesion L facial area and L upper arm A1c 5.5)    Assessment & Plan   Medicare annual wellness visit, subsequent  -     CBC; Future  -     Comprehensive Metabolic Panel, Fasting; Future  -     Lipid Panel; Future  Primary hypertension  -     CBC; Future  -     Comprehensive Metabolic Panel, Fasting; Future  -     Lipid Panel; Future  Stage 3 chronic kidney disease, unspecified whether stage 3a or 3b CKD (HCC)  -     CBC; Future  -     Comprehensive Metabolic Panel, Fasting; Future  -     Lipid Panel; Future  Hyperlipidemia, unspecified hyperlipidemia type  -     Comprehensive Metabolic Panel, Fasting; Future  -     Lipid Panel; Future  Hyperglycemia  -     POCT glycosylated hemoglobin (Hb A1C)  Osteoarthritis of lumbosacral spine  -     XR LUMBAR SPINE (MIN 4 VIEWS); Future  Vitamin B12 deficiency  -     Vitamin B12; Future  Subclinical hypothyroidism  -     TSH; Future  Vitamin D insufficiency  -     Vitamin D 25 Hydroxy; Future       Return in 6 months (on 10/3/2025) for Medicare Annual Wellness Visit in 1 year, Follow up.     Subjective   The following acute and/or chronic problems were also addressed today:  Patient here for Medicare AWV.  She complains of low back pain.  She states she has had right-sided low back pain for years.  Now pain is on the left side.  She denies any trauma.  There is no radiation or paresthesia.    Patient's complete Health Risk Assessment and screening values have been reviewed and are found in Flowsheets. The following problems were reviewed today and where indicated follow up appointments were made and/or referrals ordered.    Positive Risk Factor Screenings with Interventions:    Fall Risk:  Do you feel unsteady or are you worried about falling? : (!) yes  2 or more falls in past year?: no  Fall with injury in past year?:

## 2025-04-16 DIAGNOSIS — E03.8 SUBCLINICAL HYPOTHYROIDISM: ICD-10-CM

## 2025-04-16 DIAGNOSIS — Z00.00 MEDICARE ANNUAL WELLNESS VISIT, SUBSEQUENT: ICD-10-CM

## 2025-04-16 DIAGNOSIS — E78.5 HYPERLIPIDEMIA, UNSPECIFIED HYPERLIPIDEMIA TYPE: ICD-10-CM

## 2025-04-16 DIAGNOSIS — I10 PRIMARY HYPERTENSION: ICD-10-CM

## 2025-04-16 DIAGNOSIS — E53.8 VITAMIN B12 DEFICIENCY: ICD-10-CM

## 2025-04-16 DIAGNOSIS — E55.9 VITAMIN D INSUFFICIENCY: ICD-10-CM

## 2025-04-16 DIAGNOSIS — C44.320 SQUAMOUS CELL CARCINOMA OF SKIN OF FACE: Primary | ICD-10-CM

## 2025-04-16 DIAGNOSIS — N18.30 STAGE 3 CHRONIC KIDNEY DISEASE, UNSPECIFIED WHETHER STAGE 3A OR 3B CKD (HCC): ICD-10-CM

## 2025-04-16 LAB
ALBUMIN: 4.1 G/DL (ref 3.5–5.2)
ALP BLD-CCNC: 58 U/L (ref 35–104)
ALT SERPL-CCNC: 18 U/L (ref 0–35)
ANION GAP SERPL CALCULATED.3IONS-SCNC: 14 MMOL/L (ref 7–16)
AST SERPL-CCNC: 32 U/L (ref 0–35)
BILIRUB SERPL-MCNC: 0.7 MG/DL (ref 0–1.2)
BUN BLDV-MCNC: 20 MG/DL (ref 8–23)
CALCIUM SERPL-MCNC: 10.2 MG/DL (ref 8.8–10.2)
CHLORIDE BLD-SCNC: 103 MMOL/L (ref 98–107)
CHOLESTEROL, TOTAL: 106 MG/DL
CO2: 23 MMOL/L (ref 22–29)
CREAT SERPL-MCNC: 1.1 MG/DL (ref 0.5–1)
GFR, ESTIMATED: 46 ML/MIN/1.73M2
GLUCOSE FASTING: 128 MG/DL (ref 74–99)
HCT VFR BLD CALC: 44.5 % (ref 34–48)
HDLC SERPL-MCNC: 49 MG/DL
HEMOGLOBIN: 15.2 G/DL (ref 11.5–15.5)
LDL CHOLESTEROL: 28 MG/DL
MCH RBC QN AUTO: 44.8 PG (ref 26–35)
MCHC RBC AUTO-ENTMCNC: 34.2 G/DL (ref 32–34.5)
MCV RBC AUTO: 131.3 FL (ref 80–99.9)
PDW BLD-RTO: 15.5 % (ref 11.5–15)
PLATELET # BLD: 268 K/UL (ref 130–450)
PMV BLD AUTO: 10.5 FL (ref 7–12)
POTASSIUM SERPL-SCNC: 4.7 MMOL/L (ref 3.5–5.1)
RBC # BLD: 3.39 M/UL (ref 3.5–5.5)
SODIUM BLD-SCNC: 139 MMOL/L (ref 136–145)
TOTAL PROTEIN: 6.8 G/DL (ref 6.4–8.3)
TRIGL SERPL-MCNC: 144 MG/DL
TSH SERPL DL<=0.05 MIU/L-ACNC: 2.28 UIU/ML (ref 0.27–4.2)
VITAMIN D 25-HYDROXY: 51.9 NG/ML (ref 30–100)
VLDLC SERPL CALC-MCNC: 29 MG/DL
WBC # BLD: 9.4 K/UL (ref 4.5–11.5)

## 2025-04-17 LAB — VITAMIN B-12: 756 PG/ML (ref 232–1245)

## 2025-04-21 ENCOUNTER — OFFICE VISIT (OUTPATIENT)
Dept: ONCOLOGY | Age: 89
End: 2025-04-21
Payer: MEDICARE

## 2025-04-21 ENCOUNTER — HOSPITAL ENCOUNTER (OUTPATIENT)
Dept: INFUSION THERAPY | Age: 89
Discharge: HOME | End: 2025-04-21

## 2025-04-21 VITALS
TEMPERATURE: 98 F | WEIGHT: 137.4 LBS | HEIGHT: 64 IN | DIASTOLIC BLOOD PRESSURE: 69 MMHG | HEART RATE: 84 BPM | BODY MASS INDEX: 23.46 KG/M2 | SYSTOLIC BLOOD PRESSURE: 150 MMHG | OXYGEN SATURATION: 97 %

## 2025-04-21 DIAGNOSIS — D45 POLYCYTHEMIA VERA (HCC): ICD-10-CM

## 2025-04-21 PROCEDURE — 99212 OFFICE O/P EST SF 10 MIN: CPT

## 2025-04-21 RX ORDER — HYDROXYUREA 500 MG/1
500 CAPSULE ORAL 2 TIMES DAILY
Qty: 60 CAPSULE | Refills: 1 | Status: SHIPPED | OUTPATIENT
Start: 2025-04-21 | End: 2025-06-20

## 2025-04-21 RX ORDER — ALENDRONATE SODIUM 70 MG/1
70 TABLET ORAL WEEKLY
Qty: 12 TABLET | Refills: 3 | Status: SHIPPED | OUTPATIENT
Start: 2025-04-21

## 2025-04-21 NOTE — TELEPHONE ENCOUNTER
Requested Prescriptions     Pending Prescriptions Disp Refills    alendronate (FOSAMAX) 70 MG tablet [Pharmacy Med Name: Alendronate Sodium 70 MG Oral Tablet] 12 tablet 0     Sig: Take 1 tablet by mouth once a week       Next appt is 10/9/2025  Last appt was 4/3/2025

## 2025-04-21 NOTE — PROGRESS NOTES
[facility]  [address] [place] [id number]        Pt Name: Liilbeth Nascimento  YOB: 1935  Date of evaluation: 4/21/2025  Primary Care Physician: Reid Faith DO  Reason for evaluation:   Chief Complaint   Patient presents with    POLYCYTHEMIA FU     Follow-up          Subjective: Here for  follow up.    On Hydrea BID. Tolerating Hydrea well.        OBJECTIVE:  VITALS:  height is 1.626 m (5' 4\") and weight is 62.3 kg (137 lb 6.4 oz). Her temperature is 98 °F (36.7 °C). Her blood pressure is 150/69 (abnormal) and her pulse is 84. Her oxygen saturation is 97%.   Physical Exam:  Performance Status: 0  Well developed, well nourished female  General: AAO to person, place, time, in no acute distress.  Head and neck: PERRLA, EOMI . Sclera non icteric.  Oropharynx: Clear.  Neck: no JVD,  no adenopathy.  Heart: Regular rate and regular rhythm.  No murmur.  Lungs: Clear to auscultation.   Abdomen: Soft, non-tender;no masses, no organomegaly.  Extremities: No edema,no cyanosis, no clubbing.   Neurologic:Cranial nerves grossly intact. No focal motor or sensory deficits.  Skin:  No rash.         Medications  Prior to Admission medications    Medication Sig Start Date End Date Taking? Authorizing Provider   atorvastatin (LIPITOR) 40 MG tablet Take 1 tablet by mouth daily 1/13/25  Yes Reid Faith DO   colestipol (COLESTID) 1 g tablet TAKE 3 TABLETS  DAILY 1/13/25  Yes Reid Faith DO   escitalopram (LEXAPRO) 20 MG tablet Take 1 tablet by mouth daily 1/13/25  Yes Reid Faith DO   lisinopril (PRINIVIL;ZESTRIL) 5 MG tablet Take 1 tablet by mouth daily 1/13/25  Yes Reid Faith DO   oxyBUTYnin (DITROPAN-XL) 10 MG extended release tablet Take 1 tablet by mouth daily 1/13/25  Yes Reid Faith DO   pantoprazole (PROTONIX) 40 MG tablet Take 1 tablet by mouth daily 1/13/25  Yes Marcell, Reid S, DO   alendronate (FOSAMAX) 70 MG tablet TAKE 1 TABLET BY MOUTH 1 TIME A WEEK 5/22/24  Yes

## 2025-04-25 ENCOUNTER — RESULTS FOLLOW-UP (OUTPATIENT)
Dept: PRIMARY CARE CLINIC | Age: 89
End: 2025-04-25

## 2025-06-23 DIAGNOSIS — D45 POLYCYTHEMIA VERA (HCC): ICD-10-CM

## 2025-06-23 LAB
ALBUMIN: 3.8 G/DL (ref 3.5–5.2)
ALP BLD-CCNC: 50 U/L (ref 35–104)
ALT SERPL-CCNC: 16 U/L (ref 0–35)
ANION GAP SERPL CALCULATED.3IONS-SCNC: 15 MMOL/L (ref 7–16)
AST SERPL-CCNC: 50 U/L (ref 0–35)
BASOPHILS ABSOLUTE: 0.15 K/UL (ref 0–0.2)
BASOPHILS RELATIVE PERCENT: 2 % (ref 0–2)
BILIRUB SERPL-MCNC: 0.6 MG/DL (ref 0–1.2)
BUN BLDV-MCNC: 28 MG/DL (ref 8–23)
CALCIUM SERPL-MCNC: 10 MG/DL (ref 8.8–10.2)
CHLORIDE BLD-SCNC: 101 MMOL/L (ref 98–107)
CO2: 21 MMOL/L (ref 22–29)
CREAT SERPL-MCNC: 1.4 MG/DL (ref 0.5–1)
EOSINOPHILS ABSOLUTE: 0 K/UL (ref 0.05–0.5)
EOSINOPHILS RELATIVE PERCENT: 0 % (ref 0–6)
GFR, ESTIMATED: 35 ML/MIN/1.73M2
GLUCOSE BLD-MCNC: 223 MG/DL (ref 74–99)
HCT VFR BLD CALC: 39.8 % (ref 34–48)
HEMOGLOBIN: 13.7 G/DL (ref 11.5–15.5)
LYMPHOCYTES ABSOLUTE: 0.8 K/UL (ref 1.5–4)
LYMPHOCYTES RELATIVE PERCENT: 10 % (ref 20–42)
MCH RBC QN AUTO: 46 PG (ref 26–35)
MCHC RBC AUTO-ENTMCNC: 34.4 G/DL (ref 32–34.5)
MCV RBC AUTO: 133.6 FL (ref 80–99.9)
MONOCYTES ABSOLUTE: 0.15 K/UL (ref 0.1–0.95)
MONOCYTES RELATIVE PERCENT: 2 % (ref 2–12)
NEUTROPHILS ABSOLUTE: 7.21 K/UL (ref 1.8–7.3)
NEUTROPHILS RELATIVE PERCENT: 87 % (ref 43–80)
NUCLEATED RED BLOOD CELLS: 1 PER 100 WBC
PDW BLD-RTO: 17 % (ref 11.5–15)
PLATELET # BLD: 275 K/UL (ref 130–450)
PMV BLD AUTO: 10.9 FL (ref 7–12)
POTASSIUM SERPL-SCNC: 5.1 MMOL/L (ref 3.5–5.1)
RBC # BLD: 2.98 M/UL (ref 3.5–5.5)
RBC # BLD: ABNORMAL 10*6/UL
SODIUM BLD-SCNC: 137 MMOL/L (ref 136–145)
TOTAL PROTEIN: 6.3 G/DL (ref 6.4–8.3)
WBC # BLD: 8.3 K/UL (ref 4.5–11.5)

## 2025-06-25 ENCOUNTER — OFFICE VISIT (OUTPATIENT)
Dept: ONCOLOGY | Age: 89
End: 2025-06-25

## 2025-06-25 ENCOUNTER — HOSPITAL ENCOUNTER (OUTPATIENT)
Dept: INFUSION THERAPY | Age: 89
Discharge: HOME | End: 2025-06-25

## 2025-06-25 VITALS
HEART RATE: 87 BPM | SYSTOLIC BLOOD PRESSURE: 125 MMHG | BODY MASS INDEX: 23.05 KG/M2 | HEIGHT: 64 IN | DIASTOLIC BLOOD PRESSURE: 61 MMHG | OXYGEN SATURATION: 98 % | WEIGHT: 135 LBS | TEMPERATURE: 97.4 F

## 2025-06-25 DIAGNOSIS — D45 POLYCYTHEMIA VERA (HCC): ICD-10-CM

## 2025-06-25 RX ORDER — HYDROXYUREA 500 MG/1
500 CAPSULE ORAL 2 TIMES DAILY
Qty: 60 CAPSULE | Refills: 1 | Status: SHIPPED | OUTPATIENT
Start: 2025-06-25 | End: 2025-08-24